# Patient Record
Sex: MALE | Race: WHITE | NOT HISPANIC OR LATINO | Employment: OTHER | URBAN - METROPOLITAN AREA
[De-identification: names, ages, dates, MRNs, and addresses within clinical notes are randomized per-mention and may not be internally consistent; named-entity substitution may affect disease eponyms.]

---

## 2017-01-03 ENCOUNTER — ALLSCRIPTS OFFICE VISIT (OUTPATIENT)
Dept: OTHER | Facility: OTHER | Age: 82
End: 2017-01-03

## 2017-01-03 ENCOUNTER — HOSPITAL ENCOUNTER (OUTPATIENT)
Dept: RADIOLOGY | Facility: HOSPITAL | Age: 82
Discharge: HOME | End: 2017-01-03
Attending: ORTHOPAEDIC SURGERY
Payer: MEDICARE

## 2017-01-03 DIAGNOSIS — I48.91 ATRIAL FIBRILLATION (HCC): ICD-10-CM

## 2017-01-03 DIAGNOSIS — M25.551 PAIN IN RIGHT HIP: ICD-10-CM

## 2017-01-03 DIAGNOSIS — I10 ESSENTIAL (PRIMARY) HYPERTENSION: ICD-10-CM

## 2017-01-03 DIAGNOSIS — I25.10 ATHEROSCLEROTIC HEART DISEASE OF NATIVE CORONARY ARTERY WITHOUT ANGINA PECTORIS: ICD-10-CM

## 2017-01-03 DIAGNOSIS — B35.6 TINEA CRURIS: ICD-10-CM

## 2017-01-03 DIAGNOSIS — E04.1 NONTOXIC SINGLE THYROID NODULE: ICD-10-CM

## 2017-01-03 PROCEDURE — 73502 X-RAY EXAM HIP UNI 2-3 VIEWS: CPT

## 2017-01-17 ENCOUNTER — APPOINTMENT (OUTPATIENT)
Dept: LAB | Age: 82
End: 2017-01-17
Payer: MEDICARE

## 2017-01-17 ENCOUNTER — TRANSCRIBE ORDERS (OUTPATIENT)
Dept: URGENT CARE | Age: 82
End: 2017-01-17

## 2017-01-17 DIAGNOSIS — I48.91 ATRIAL FIBRILLATION (HCC): ICD-10-CM

## 2017-01-17 LAB
INR PPP: 3.13 (ref 0.86–1.16)
PROTHROMBIN TIME: 31.6 SECONDS (ref 12–14.3)

## 2017-01-17 PROCEDURE — 36415 COLL VENOUS BLD VENIPUNCTURE: CPT

## 2017-01-17 PROCEDURE — 85610 PROTHROMBIN TIME: CPT

## 2017-01-18 ENCOUNTER — ALLSCRIPTS OFFICE VISIT (OUTPATIENT)
Dept: OTHER | Facility: OTHER | Age: 82
End: 2017-01-18

## 2017-01-24 ENCOUNTER — TRANSCRIBE ORDERS (OUTPATIENT)
Dept: ADMINISTRATIVE | Age: 82
End: 2017-01-24

## 2017-01-24 ENCOUNTER — APPOINTMENT (OUTPATIENT)
Dept: LAB | Age: 82
End: 2017-01-24
Payer: MEDICARE

## 2017-01-24 DIAGNOSIS — E04.1 NONTOXIC SINGLE THYROID NODULE: ICD-10-CM

## 2017-01-24 DIAGNOSIS — I10 ESSENTIAL (PRIMARY) HYPERTENSION: ICD-10-CM

## 2017-01-24 DIAGNOSIS — B35.6 TINEA CRURIS: ICD-10-CM

## 2017-01-24 DIAGNOSIS — I25.10 ATHEROSCLEROTIC HEART DISEASE OF NATIVE CORONARY ARTERY WITHOUT ANGINA PECTORIS: ICD-10-CM

## 2017-01-24 LAB
ALBUMIN SERPL BCP-MCNC: 3.5 G/DL (ref 3.5–5)
ALP SERPL-CCNC: 69 U/L (ref 46–116)
ALT SERPL W P-5'-P-CCNC: 15 U/L (ref 12–78)
ANION GAP SERPL CALCULATED.3IONS-SCNC: 9 MMOL/L (ref 4–13)
AST SERPL W P-5'-P-CCNC: 10 U/L (ref 5–45)
BILIRUB SERPL-MCNC: 0.51 MG/DL (ref 0.2–1)
BUN SERPL-MCNC: 14 MG/DL (ref 5–25)
CALCIUM SERPL-MCNC: 8.6 MG/DL (ref 8.3–10.1)
CHLORIDE SERPL-SCNC: 108 MMOL/L (ref 100–108)
CHOLEST SERPL-MCNC: 208 MG/DL (ref 50–200)
CO2 SERPL-SCNC: 24 MMOL/L (ref 21–32)
CREAT SERPL-MCNC: 1.01 MG/DL (ref 0.6–1.3)
GFR SERPL CREATININE-BSD FRML MDRD: >60 ML/MIN/1.73SQ M
GLUCOSE SERPL-MCNC: 98 MG/DL (ref 65–140)
HDLC SERPL-MCNC: 46 MG/DL (ref 40–60)
LDLC SERPL CALC-MCNC: 139 MG/DL (ref 0–100)
POTASSIUM SERPL-SCNC: 3.8 MMOL/L (ref 3.5–5.3)
PROT SERPL-MCNC: 6.5 G/DL (ref 6.4–8.2)
SODIUM SERPL-SCNC: 141 MMOL/L (ref 136–145)
TRIGL SERPL-MCNC: 113 MG/DL
TSH SERPL DL<=0.05 MIU/L-ACNC: 2.41 UIU/ML (ref 0.36–3.74)

## 2017-01-24 PROCEDURE — 84443 ASSAY THYROID STIM HORMONE: CPT

## 2017-01-24 PROCEDURE — 36415 COLL VENOUS BLD VENIPUNCTURE: CPT

## 2017-01-24 PROCEDURE — 80061 LIPID PANEL: CPT

## 2017-01-24 PROCEDURE — 80053 COMPREHEN METABOLIC PANEL: CPT

## 2017-01-26 ENCOUNTER — APPOINTMENT (OUTPATIENT)
Dept: LAB | Age: 82
End: 2017-01-26
Payer: MEDICARE

## 2017-01-26 ENCOUNTER — TRANSCRIBE ORDERS (OUTPATIENT)
Dept: ADMINISTRATIVE | Age: 82
End: 2017-01-26

## 2017-01-26 DIAGNOSIS — I48.91 ATRIAL FIBRILLATION, UNSPECIFIED TYPE (HCC): Primary | ICD-10-CM

## 2017-01-26 DIAGNOSIS — I48.91 ATRIAL FIBRILLATION, UNSPECIFIED TYPE (HCC): ICD-10-CM

## 2017-01-26 LAB
INR PPP: 3.31 (ref 0.86–1.16)
PROTHROMBIN TIME: 32.9 SECONDS (ref 12–14.3)

## 2017-01-26 PROCEDURE — 85610 PROTHROMBIN TIME: CPT

## 2017-01-26 PROCEDURE — 36415 COLL VENOUS BLD VENIPUNCTURE: CPT

## 2017-02-07 ENCOUNTER — APPOINTMENT (OUTPATIENT)
Dept: LAB | Age: 82
End: 2017-02-07
Payer: MEDICARE

## 2017-02-07 DIAGNOSIS — I48.91 ATRIAL FIBRILLATION, UNSPECIFIED TYPE (HCC): ICD-10-CM

## 2017-02-07 LAB
INR PPP: 2.07 (ref 0.86–1.16)
PROTHROMBIN TIME: 23.1 SECONDS (ref 12–14.3)

## 2017-02-07 PROCEDURE — 36415 COLL VENOUS BLD VENIPUNCTURE: CPT

## 2017-02-07 PROCEDURE — 85610 PROTHROMBIN TIME: CPT

## 2017-02-08 ENCOUNTER — ALLSCRIPTS OFFICE VISIT (OUTPATIENT)
Dept: OTHER | Facility: OTHER | Age: 82
End: 2017-02-08

## 2017-02-21 ENCOUNTER — ALLSCRIPTS OFFICE VISIT (OUTPATIENT)
Dept: OTHER | Facility: OTHER | Age: 82
End: 2017-02-21

## 2017-04-04 ENCOUNTER — ALLSCRIPTS OFFICE VISIT (OUTPATIENT)
Dept: OTHER | Facility: OTHER | Age: 82
End: 2017-04-04

## 2017-04-05 ENCOUNTER — ALLSCRIPTS OFFICE VISIT (OUTPATIENT)
Dept: OTHER | Facility: OTHER | Age: 82
End: 2017-04-05

## 2017-04-27 ENCOUNTER — ALLSCRIPTS OFFICE VISIT (OUTPATIENT)
Dept: OTHER | Facility: OTHER | Age: 82
End: 2017-04-27

## 2017-05-11 ENCOUNTER — ALLSCRIPTS OFFICE VISIT (OUTPATIENT)
Dept: OTHER | Facility: OTHER | Age: 82
End: 2017-05-11

## 2017-05-16 ENCOUNTER — ALLSCRIPTS OFFICE VISIT (OUTPATIENT)
Dept: OTHER | Facility: OTHER | Age: 82
End: 2017-05-16

## 2017-05-16 DIAGNOSIS — M48.061 SPINAL STENOSIS OF LUMBAR REGION: ICD-10-CM

## 2017-05-16 DIAGNOSIS — M54.16 RADICULOPATHY OF LUMBAR REGION: ICD-10-CM

## 2017-05-16 DIAGNOSIS — M51.36 OTHER INTERVERTEBRAL DISC DEGENERATION, LUMBAR REGION: ICD-10-CM

## 2017-05-16 DIAGNOSIS — M54.50 LOW BACK PAIN: ICD-10-CM

## 2017-05-16 DIAGNOSIS — G89.4 CHRONIC PAIN SYNDROME: ICD-10-CM

## 2017-05-28 ENCOUNTER — HOSPITAL ENCOUNTER (OUTPATIENT)
Dept: CT IMAGING | Facility: HOSPITAL | Age: 82
Discharge: HOME | End: 2017-05-28
Attending: ANESTHESIOLOGY
Payer: MEDICARE

## 2017-05-28 DIAGNOSIS — M48.061 SPINAL STENOSIS OF LUMBAR REGION: ICD-10-CM

## 2017-05-28 DIAGNOSIS — G89.4 CHRONIC PAIN SYNDROME: ICD-10-CM

## 2017-05-28 DIAGNOSIS — M51.36 OTHER INTERVERTEBRAL DISC DEGENERATION, LUMBAR REGION: ICD-10-CM

## 2017-05-28 DIAGNOSIS — M54.16 RADICULOPATHY OF LUMBAR REGION: ICD-10-CM

## 2017-05-28 DIAGNOSIS — M54.50 LOW BACK PAIN: ICD-10-CM

## 2017-05-28 PROCEDURE — 72131 CT LUMBAR SPINE W/O DYE: CPT

## 2017-06-07 ENCOUNTER — ALLSCRIPTS OFFICE VISIT (OUTPATIENT)
Dept: OTHER | Facility: OTHER | Age: 82
End: 2017-06-07

## 2017-06-13 ENCOUNTER — GENERIC CONVERSION - ENCOUNTER (OUTPATIENT)
Dept: OTHER | Facility: OTHER | Age: 82
End: 2017-06-13

## 2017-06-22 ENCOUNTER — GENERIC CONVERSION - ENCOUNTER (OUTPATIENT)
Dept: OTHER | Facility: OTHER | Age: 82
End: 2017-06-22

## 2017-06-28 ENCOUNTER — GENERIC CONVERSION - ENCOUNTER (OUTPATIENT)
Dept: OTHER | Facility: OTHER | Age: 82
End: 2017-06-28

## 2017-07-05 ENCOUNTER — ALLSCRIPTS OFFICE VISIT (OUTPATIENT)
Dept: OTHER | Facility: OTHER | Age: 82
End: 2017-07-05

## 2017-07-07 ENCOUNTER — TRANSCRIBE ORDERS (OUTPATIENT)
Dept: LAB | Facility: HOSPITAL | Age: 82
End: 2017-07-07

## 2017-07-07 ENCOUNTER — APPOINTMENT (OUTPATIENT)
Dept: LAB | Facility: HOSPITAL | Age: 82
End: 2017-07-07
Attending: ANESTHESIOLOGY
Payer: MEDICARE

## 2017-07-07 ENCOUNTER — ALLSCRIPTS OFFICE VISIT (OUTPATIENT)
Dept: RADIOLOGY | Facility: CLINIC | Age: 82
End: 2017-07-07
Payer: MEDICARE

## 2017-07-07 DIAGNOSIS — I48.91 ATRIAL FIBRILLATION (HCC): ICD-10-CM

## 2017-07-07 DIAGNOSIS — N39.0 URINARY TRACT INFECTION: ICD-10-CM

## 2017-07-07 LAB
INR PPP: 1.12 (ref 0.86–1.16)
PROTHROMBIN TIME: 14.4 SECONDS (ref 12.1–14.4)

## 2017-07-07 PROCEDURE — 85610 PROTHROMBIN TIME: CPT

## 2017-07-07 PROCEDURE — 36415 COLL VENOUS BLD VENIPUNCTURE: CPT

## 2017-07-31 ENCOUNTER — GENERIC CONVERSION - ENCOUNTER (OUTPATIENT)
Dept: OTHER | Facility: OTHER | Age: 82
End: 2017-07-31

## 2017-08-02 ENCOUNTER — ALLSCRIPTS OFFICE VISIT (OUTPATIENT)
Dept: OTHER | Facility: OTHER | Age: 82
End: 2017-08-02

## 2017-08-16 ENCOUNTER — ALLSCRIPTS OFFICE VISIT (OUTPATIENT)
Dept: OTHER | Facility: OTHER | Age: 82
End: 2017-08-16

## 2017-09-06 ENCOUNTER — GENERIC CONVERSION - ENCOUNTER (OUTPATIENT)
Dept: OTHER | Facility: OTHER | Age: 82
End: 2017-09-06

## 2017-09-20 ENCOUNTER — GENERIC CONVERSION - ENCOUNTER (OUTPATIENT)
Dept: OTHER | Facility: OTHER | Age: 82
End: 2017-09-20

## 2017-09-29 ENCOUNTER — GENERIC CONVERSION - ENCOUNTER (OUTPATIENT)
Dept: OTHER | Facility: OTHER | Age: 82
End: 2017-09-29

## 2017-11-08 ENCOUNTER — ALLSCRIPTS OFFICE VISIT (OUTPATIENT)
Dept: OTHER | Facility: OTHER | Age: 82
End: 2017-11-08

## 2017-11-10 NOTE — PROGRESS NOTES
Assessment    1  Acute diastolic congestive heart failure (428 31,428 0) (I50 31)   2  Cellulitis of right lower extremity (682 6) (L03 115)   3  Fall (E888 9) [facility])   4  Pressure ulcer, buttock (707 05,707 20) (L89 309)   5  Unsteady gait (781 2) (R26 81)   6  Bladder incontinence (788 30) (R32)   7  Renal insufficiency (593 9) (N28 9)    Plan  Acute diastolic congestive heart failure    · Furosemide 40 MG Oral Tablet; Take 1 tablet daily   · Potassium Chloride ER 10 MEQ Oral Capsule Extended Release; take 1 capsule daily  Cellulitis of right lower extremity    · Cephalexin 500 MG Oral Capsule; TAKE 1 CAPSULE 4 TIMES DAILY   · (1) COMPREHENSIVE METABOLIC PANEL; Status:Active; Requested for:13Nov2017;   1  Acute diastolic congestive heart failure patient has no recent angina symptoms he does have +2 pitting edema of both legs  He appears to have a diastolic congestive heart failure condition  He is in chronic atrial fibrillation and has a reduced GFR with renal insufficiency  Will cautiously add a diuretic of 40 mg for 0 some eye daily along with 10 mEq of potassium chloride  A follow-up comprehensive metabolic profile is requested within 1 week  He is encouraged to elevate his feet when he is not ambulating  He is also encouraged to decrease salt consumption  2   Cellulitis with open wound of the right lower leg wound was clean and redressed today  He does have an appointment with the [facility] on Friday of this week  He is not making any progress on clindamycin we switched him today to Keflex 500 mg 4 times a day to treat the cellulitis  He may need some debridement of the wound at the wound clinic  3   History of fall patient is by himself for part of the day which appears to be a high risk situation    I did mention to the family that I believe this may be approaching a point where he should no longer be in an independent living situation by himself if he is going to remain in the independent living situation he may need more assistant throughout the day for the patient's safety  4   Pressure ulcer of the buttocks compounded by the fact that the patient is incontinent of urine and sits in a depends saturated with urine sometimes  Indicated that he needs to avoid voiding in the depends  I suggested that they discuss with his the patient's urologist the possibility of a Rodriguez catheter  Did also discussed with them the fact that the Rodriguez catheter might increase his risk of urinary tract infections  5   Unsteady gait the patient requires assistance of 2 people to stand from a seated position we did not try to test his gait as he is very weak and unsteady on his feet  Again this patient should most likely be moved from an independent living situation to at the very minimum assisted living if not skilled nursing  6   Bladder incontinence will discuss Rodriguez catheter with the patient's urologist   7   Renal insufficiency patient has a decreased GFR and elevated creatinine  Will start diuretic to take to treat his congestive heart failure but will closely monitor his kidney function  Discussion/Summary  Discussion Summary:   In summary met with the patient and his granddaughter today he has multiple significant problems at the advanced age of 80  He will be going to the wound clinic for further management of his right lower leg wound he also has diastolic congestive heart failure and renal insufficiency  He has a stage II decubitus of his left buttocks secondary to sitting for an extended period of time during the day as well as incontinence of urine into his depends garment  Will see the patient in a week for follow-up assessment  Counseling Documentation With Imm: total time of encounter was 45 minutes-- and-- 35 minutes was spent counseling        Chief Complaint  Chief Complaint Free Text Note Form: Patient is here today for a follow up visit after an ER trip       History of Present Illness  HPI: This 40-year-old gentleman presents today in the company of his granddaughter  He was seen in the emergency room at [facility] on November 5, 2017  He had a wound to his right lower leg which was sustained in a fall  This wound was infected and the patient was started on clindamycin 300 mg 3 times a day  In the ensuing time there has been no significant improvement in the wound or inflammation in his leg  He has bilateral edema of both lower legs  He denies any chest pain palpitations or shortness of breath   patient also has a stage II decubitus of his left buttocks  patient lives in an independent living facility with the assistance of health aides  He does not have round-the-clock assistance  When I asked him how he gets to the bathroom he indicates that frequently just avoids in his depends garments  He spends the bulk of his time in a recliner during the day moving into a wheelchair for mobility to the dining osborne  He denies any fevers or chills his appetite has been somewhat diminished  review of the blood work performed at the emergency room indicates that he does have a mild renal insufficiency with a GFR 47 cc/minute and a creatinine of 1 26  He also has a mild elevation in his blood sugar of 106 but this was a random blood sugar reading  Review of Systems  Complete-Male:  Constitutional: feeling poorly-- and-- feeling tired, but-- no fever-- and-- no chills  Eyes: No complaints of eye pain, no red eyes, no discharge from eyes, no itchy eyes  ENT: no complaints of earache, no hearing loss, no nosebleeds, no nasal discharge, no sore throat, no hoarseness  Cardiovascular: lower extremity edema-- and-- Bilateral lower leg edema +2 pitting, but-- as noted in HPI  Respiratory: No complaints of shortness of breath, no wheezing, no cough, no SOB on exertion, no orthopnea or PND    Gastrointestinal: No complaints of abdominal pain, no constipation, no nausea or vomiting, no diarrhea or bloody stools  Genitourinary: incontinence  Musculoskeletal: No complaints of arthralgia, no myalgias, no joint swelling or stiffness, no limb pain or swelling  Integumentary: Wound on the right lower shin area approximately 1 inch x 3 inches with surrounding area of cellulitis  Neurological: No compliants of headache, no confusion, no convulsions, no numbness or tingling, no dizziness or fainting, no limb weakness, no difficulty walking  Psychiatric: Is not suicidal, no sleep disturbances, no anxiety or depression, no change in personality, no emotional problems  Endocrine: No complaints of proptosis, no hot flashes, no muscle weakness, no erectile dysfunction, no deepening of the voice, no feelings of weakness  Hematologic/Lymphatic: No complaints of swollen glands, no swollen glands in the neck, does not bleed easily, no easy bruising  Active Problems    1  Acid indigestion (536 8) (K30)   2  Acute UTI (599 0) (N39 0)   3  Adjustment disorder with mixed anxiety and depressed mood (309 28) (F43 23)   4  Allergic rhinitis (477 9) (J30 9)   5  At risk for injury related to fall (V49 89) (Z91 89)   6  Atrial fibrillation (427 31) (I48 91)   7  Back pain (724 5) (M54 9)   8  Benign Localized Prostatic Hyperplasia Without Urinary Obstruction With Other Lower Urinary Tract Symptoms (600 20)   9  CAD (coronary artery disease) (414 00) (I25 10)   10  Carpal tunnel syndrome (354 0) (G56 00)   11  Chronic low back pain (724 2,338 29) (M54 5,G89 29)   12  Chronic pain syndrome (338 4) (G89 4)   13  Constipation, acute (564 00) (K59 00)   14  DDD (degenerative disc disease), lumbar (722 52) (M51 36)   15  Depression (311) (F32 9)   16  DJD (degenerative joint disease) of pelvis (715 95) (M16 10)   17  Dyspnea (786 09) (R06 00)   18  Dysuria (788 1) (R30 0)   19  Edema (782 3) (R60 9)   20  Esophageal reflux (530 81) (K21 9)   21  Fall (E888 9) (W19 XXXA)   22  Fatigue (780 79) (R53 83)   23  Foreskin inflammation (607 2) (N48 29)   24  Foreskin problem (607 89) (N47 8)   25  Greater trochanteric bursitis, right (726 5) (M70 61)   26  Heart block (426 9) (I45 9)   27  Hypertension (401 9) (I10)   28  Insomnia (780 52) (G47 00)   29  Lumbar canal stenosis (724 02) (M48 061)   30  Lumbar radiculopathy (724 4) (M54 16)   31  History of Need for prophylactic vaccination and inoculation against influenza (V04 81) (Z23)   32  Nocturia (788 43) (R35 1)   33  Orthopedic Aftercare For Healing Traumatic Fx Lower Arm (V54 12)   34  Osteoarthritis of knee, unilateral (715 16) (M17 10)   35  Osteoarthrosis (715 90) (M19 90)   36  Other acute gastritis without hemorrhage (535 00) (K29 00)   37  Other chronic pain (338 29) (G89 29)   38  Pain in joint of right hip (719 45) (M25 551)   39  Physical deconditioning (799 3) (R53 81)   40  Pressure ulcer, buttock (707 05,707 20) (L89 309)   41  Prostatitis (601 9) (N41 9)   42  Right hip pain (719 45) (M25 551)   43  Sick sinus syndrome (427 81) (I49 5)   44  Skin tear (879 8)   45  Sleep disorder (780 50) (G47 9)   46  Symptoms involving urinary system (788 99) (R39 9)   47  Thyroid nodule (241 0) (E04 1)   48  Tinea cruris (110 3) (B35 6)   49  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)   50  Unsteady gait (781 2) (R26 81)   51  Urgency of urination (788 63) (R39 15)   52  URI (upper respiratory infection) (465 9) (J06 9)   53  Urinary frequency (788 41) (R35 0)   54  UTI (urinary tract infection) (599 0) (N39 0)   55  Viral infection (079 99) (B34 9)   56  Yeast infection of the skin (112 3) (B37 2)    Past Medical History  1  History of Acute deep vein thrombosis of lower limb, unspecified laterality   2  History of Arthralgia of right shoulder region (719 41) (M25 511)   3  History of Arthralgia Of The Right Pelvis/Hip/Femur (719 45)   4  History of Arthritis (V13 4)   5   History of Arthropathy of lumbar facet joint (721 3) (M12 88)   6  History of 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 01,599 69) (N40 1,N13 8)   7  History of Compression fracture (829 0)   8  History of Depression (311) (F32 9)   9  History of Fracture of radius, distal, closed (813 42) (S52 509A)   10  History of acute sinusitis (V12 69) (Z87 09)   11  History of bronchitis (V12 69) (Z87 09)   12  History of gastroesophageal reflux (GERD) (V12 79) (Z87 19)   13  History of herpes simplex infection (V12 09) (Z86 19)   14  History of Need for immunization against influenza (V04 81) (Z23)   15  History of Need for prophylactic vaccination and inoculation against influenza (V04 81) (Z23)   16  History of Nephrolithiasis (V13 01)   17  History of Organic impotence (607 84) (N52 9)   18  History of Pacemaker Evaluation Adjustment Of Cardiac Pacemaker   19  History of Tachycardia (785 0) (R00 0)   20  History of Venous insufficiency (chronic) (peripheral) (459 81) (I87 2)  Active Problems And Past Medical History Reviewed: The active problems and past medical history were reviewed and updated today  Surgical History  1  History of Appendectomy   2  History of Back Surgery   3  History of Cholecystectomy   4  History of Diagnostic Cystoscopy   5  History of Gallbladder Surgery   6  History of Hip Surgery   7  Orthopedic Aftercare For Healing Traumatic Fx Lower Arm (V54 12)   8  History of Pacemaker Placement   9  History of Pacemaker Placement  Surgical History Reviewed: The surgical history was reviewed and updated today  Family History  Brother    1  Family history of Heart Disease (V17 49)  Family History    2  Family history of Cancer   3  Family history of Reported Family History Of Cancer   4  Family history of Reported Family History Of Heart Disease   5  Family history of Stroke Syndrome (V17 1)  Family History Reviewed: The family history was reviewed and updated today         Social History     · Denied: History of Alcohol Use (History)   · Being A Social Drinker   · Denied: History of Current Smoker   · Denied: History of Drug Use   · Former smoker (V15 82) (U90 219)   · Never A Smoker   ·   Social History Reviewed: The social history was reviewed and updated today  The social history was reviewed and is unchanged  Current Meds   1  Aspercreme LOTN; Therapy: (Recorded:37Lbu6165) to Recorded   2  Centrum Silver Oral Tablet Recorded   3  [employer] Allergy Relief 180 MG Oral Tablet Recorded   4  [employer] Vitamin D CAPS Recorded   5  Famciclovir 125 MG Oral Tablet; Take 1 tablet daily; Therapy: 85LEX3861 to (Evaluate:28Apr2018)  Requested for: 85Auj8840; Last Rx:01Aug2017 Ordered   6  Finasteride 5 MG Oral Tablet; TAKE 1 TABLET AT BEDTIME; Therapy: 08KQK5120 to (Last Rx:19Jan2017)  Requested for: 20Jan2017 Ordered   7  Flecainide Acetate 50 MG Oral Tablet; Take 1 tablet daily; Therapy: 84XMR8623 to (Evaluate:12Oct2017)  Requested for: 25DZD6201; Last Rx:17Oct2016 Ordered   8  Fluticasone Propionate 50 MCG/ACT Nasal Suspension; USE 1 SPRAY IN EACH NOSTRIL TWICE DAILY; Therapy: 17SWV3133 to (Last Rx:93Xhe0976)  Requested for: 91Vhk9472 Ordered   9  Galantamine Hydrobromide 4 MG Oral Tablet; Take 1 tablet daily; Therapy: 27PHD4455 to (JECHTGXV:34IJN9406)  Requested for: 24JOP6021; Last Rx:27Jan2017 Ordered   10  Hydrocodone-Acetaminophen 5-325 MG Oral Tablet; TAKE 1 TABLET EVERY 4 TO 6 HOURS AS  NEEDED; Therapy: (Recorded:07Jun2017) to Recorded   11  MiraLax Oral Powder; MIX 17 GRAMS IN 8 OUNCES OF WATER AND DRINK ONCE DAILY  As needed  for constipation; Therapy: 33RZI4525 to ([id number])  Requested for: 20PTU7460; Last Rx:34Pow3456  Ordered   12  Pantoprazole Sodium 40 MG Oral Tablet Delayed Release; Take 1 tablet twice daily; Therapy: 83HFS6922 to (QFZPTGXQ:28NNN0836)  Requested for: 67FZF3874; Last Rx:18Jan2017  Ordered   13  Restasis 0 05 % Ophthalmic Emulsion;   Therapy: 22Dec2014 to (Jordan Berry) Recorded   14  Tamsulosin HCl - 0 4 MG Oral Capsule; TAKE 1 CAPSULE AT BEDTIME; Therapy: 75CVE2368 to (Last Rx:19Jan2017)  Requested for: 20Jan2017 Ordered   15  TraZODone HCl - 50 MG Oral Tablet; Take 1 tablet by mouth at bedtime; Therapy: 71VMY8343 to )  Requested for: 21YYT2132; Last Rx:23Xhq4328  Ordered   16  VESIcare 5 MG Oral Tablet; One pill at bedtime; Therapy: 40BCE5491 to (Evaluate:14Jan2018)  Requested for: 33ILR3079; Last Rx:19Jan2017  Ordered   17  Warfarin Sodium 2 MG Oral Tablet; Take 1 tablet daily as directed; Therapy: 21GJS8782 to (Evaluate:07Iza7119)  Requested for: 38Ana1667; Last Rx:49Npl3511  Ordered   18  Warfarin Sodium 5 MG Oral Tablet; Take 1 tablet daily as directed; Therapy: 19GLM5909 to (Last Rx:71Wtl6996)  Requested for: 87Jyj9806 Ordered   19  Zolpidem Tartrate 5 MG Oral Tablet; TAKE 1 TABLET AT BEDTIME AS NEEDED FOR SLEEP; Therapy: 53VRS5916 to (Evaluate:95Xvo0967); Last Rx:20Sep2017 Ordered  Medication List Reviewed: The medication list was reviewed and updated today  Allergies  1  CeleBREX CAPS   2  Sulfa Drugs  3  No Known Environmental Allergies   4  No Known Food Allergies    Vitals  Vital Signs    Recorded: 10XRG0519 11:36AM   Temperature 97 3 F    Heart Rate 85    Systolic 718    Diastolic 70    Height 5 ft 7 in    Patient Refused Weight Yes Yes   O2 Saturation 94        Physical Exam   Constitutional  General appearance: No acute distress, well appearing and well nourished  Eyes  Conjunctiva and lids: No swelling, erythema, or discharge  Ears, Nose, Mouth, and Throat  External inspection of ears and nose: Normal    Pulmonary  Respiratory effort: No increased work of breathing or signs of respiratory distress  Auscultation of lungs: Clear to auscultation, equal breath sounds bilaterally, no wheezes, no rales, no rhonci  -- Lungs clear bilaterally no rales rhonchi or wheezing are audible    Cardiovascular  Auscultation of heart: Abnormal  -- Irregular rhythm of the heart  Examination of extremities for edema and/or varicosities: Abnormal  -- Plus two pitting edema of the feet and lower legs below the knees bilaterally  Carotid pulses: Normal    Lymphatic  Palpation of lymph nodes in neck: No lymphadenopathy  Musculoskeletal  Gait and station: Abnormal  -- Very unsteady requires a supportive 2 people to stand from a seated position  Psychiatric  Orientation to person, place and time: Normal    Mood and affect: Normal    Additional Exam:  Open wound of the right lower leg approximately 1 x 3 inches in size with appears to be necrotic tissue at the base  A surrounding area of cellulitis present  Future Appointments    Date/Time Provider Specialty Site   11/28/2017 03:00 PM Cardiology, [address] [place] [facility]   02/06/2018 10:30 AM Cardiology, Device Remote   [address]   05/07/2018 08:30 AM Cardiology, Device Remote   [address]   08/08/2018 09:30 AM Cardiology, Device Remote   [address]   11/15/2017 09:30 AM RADHA Hernandez  Internal Medicine [facility] INTERNAL MED   11/16/2017 05:15 PM RADHA Hernandez   Internal Medicine [address]       Signatures   Electronically signed by : RADHA Ballesteros ; Nov 8 2017  2:57PM EST                       (Author)

## 2017-11-13 DIAGNOSIS — L03.115 CELLULITIS OF RIGHT LOWER EXTREMITY: ICD-10-CM

## 2017-11-15 ENCOUNTER — GENERIC CONVERSION - ENCOUNTER (OUTPATIENT)
Dept: OTHER | Facility: OTHER | Age: 82
End: 2017-11-15

## 2017-11-28 ENCOUNTER — ALLSCRIPTS OFFICE VISIT (OUTPATIENT)
Dept: OTHER | Facility: OTHER | Age: 82
End: 2017-11-28

## 2017-12-04 ENCOUNTER — HOSPITAL ENCOUNTER (INPATIENT)
Facility: HOSPITAL | Age: 82
LOS: 4 days | Discharge: NURSING, REHAB OR LONG-TERM CARE | DRG: 193 | End: 2017-12-08
Attending: EMERGENCY MEDICINE | Admitting: EMERGENCY MEDICINE
Payer: MEDICARE

## 2017-12-04 ENCOUNTER — APPOINTMENT (EMERGENCY)
Dept: RADIOLOGY | Facility: HOSPITAL | Age: 82
DRG: 193 | End: 2017-12-04
Payer: MEDICARE

## 2017-12-04 DIAGNOSIS — J18.9 PNEUMONIA OF BOTH LUNGS DUE TO INFECTIOUS ORGANISM, UNSPECIFIED PART OF LUNG: Primary | ICD-10-CM

## 2017-12-04 DIAGNOSIS — I48.20 CHRONIC ATRIAL FIBRILLATION (HCC): ICD-10-CM

## 2017-12-04 LAB
ALBUMIN SERPL BCP-MCNC: 2.7 G/DL (ref 3.5–5)
ALP SERPL-CCNC: 60 U/L (ref 46–116)
ALT SERPL W P-5'-P-CCNC: 17 U/L (ref 12–78)
ANION GAP SERPL CALCULATED.3IONS-SCNC: 8 MMOL/L (ref 4–13)
AST SERPL W P-5'-P-CCNC: 12 U/L (ref 5–45)
ATRIAL RATE: 81 BPM
BASOPHILS # BLD AUTO: 0.01 THOUSANDS/ΜL (ref 0–0.1)
BASOPHILS NFR BLD AUTO: 0 % (ref 0–1)
BILIRUB SERPL-MCNC: 0.4 MG/DL (ref 0.2–1)
BILIRUB UR QL STRIP: NEGATIVE
BUN SERPL-MCNC: 22 MG/DL (ref 5–25)
CALCIUM SERPL-MCNC: 8.8 MG/DL (ref 8.3–10.1)
CHLORIDE SERPL-SCNC: 103 MMOL/L (ref 100–108)
CLARITY UR: CLEAR
CO2 SERPL-SCNC: 27 MMOL/L (ref 21–32)
COLOR UR: YELLOW
CREAT SERPL-MCNC: 1.32 MG/DL (ref 0.6–1.3)
EOSINOPHIL # BLD AUTO: 0.05 THOUSAND/ΜL (ref 0–0.61)
EOSINOPHIL NFR BLD AUTO: 0 % (ref 0–6)
ERYTHROCYTE [DISTWIDTH] IN BLOOD BY AUTOMATED COUNT: 13.5 % (ref 11.6–15.1)
GFR SERPL CREATININE-BSD FRML MDRD: 45 ML/MIN/1.73SQ M
GLUCOSE SERPL-MCNC: 116 MG/DL (ref 65–140)
GLUCOSE UR STRIP-MCNC: NEGATIVE MG/DL
HCT VFR BLD AUTO: 34.9 % (ref 36.5–49.3)
HGB BLD-MCNC: 11.3 G/DL (ref 12–17)
HGB UR QL STRIP.AUTO: NEGATIVE
HOLD SPECIMEN: NORMAL
INR PPP: 2.08 (ref 0.86–1.16)
KETONES UR STRIP-MCNC: NEGATIVE MG/DL
LACTATE SERPL-SCNC: 1.4 MMOL/L (ref 0.5–2)
LEUKOCYTE ESTERASE UR QL STRIP: NEGATIVE
LYMPHOCYTES # BLD AUTO: 1.5 THOUSANDS/ΜL (ref 0.6–4.47)
LYMPHOCYTES NFR BLD AUTO: 13 % (ref 14–44)
MCH RBC QN AUTO: 28.8 PG (ref 26.8–34.3)
MCHC RBC AUTO-ENTMCNC: 32.4 G/DL (ref 31.4–37.4)
MCV RBC AUTO: 89 FL (ref 82–98)
MONOCYTES # BLD AUTO: 0.75 THOUSAND/ΜL (ref 0.17–1.22)
MONOCYTES NFR BLD AUTO: 7 % (ref 4–12)
NEUTROPHILS # BLD AUTO: 9.05 THOUSANDS/ΜL (ref 1.85–7.62)
NEUTS SEG NFR BLD AUTO: 80 % (ref 43–75)
NITRITE UR QL STRIP: NEGATIVE
P AXIS: 12 DEGREES
PH UR STRIP.AUTO: 6.5 [PH] (ref 4.5–8)
PLATELET # BLD AUTO: 191 THOUSANDS/UL (ref 149–390)
PMV BLD AUTO: 9.7 FL (ref 8.9–12.7)
POTASSIUM SERPL-SCNC: 3.8 MMOL/L (ref 3.5–5.3)
PR INTERVAL: 120 MS
PROT SERPL-MCNC: 6.2 G/DL (ref 6.4–8.2)
PROT UR STRIP-MCNC: NEGATIVE MG/DL
PROTHROMBIN TIME: 24.1 SECONDS (ref 12.1–14.4)
QRS AXIS: -71 DEGREES
QRSD INTERVAL: 164 MS
QT INTERVAL: 430 MS
QTC INTERVAL: 499 MS
RBC # BLD AUTO: 3.93 MILLION/UL (ref 3.88–5.62)
SODIUM SERPL-SCNC: 138 MMOL/L (ref 136–145)
SP GR UR STRIP.AUTO: 1.01 (ref 1–1.03)
T WAVE AXIS: 90 DEGREES
UROBILINOGEN UR QL STRIP.AUTO: 0.2 E.U./DL
VENTRICULAR RATE: 81 BPM
WBC # BLD AUTO: 11.36 THOUSAND/UL (ref 4.31–10.16)

## 2017-12-04 PROCEDURE — 87040 BLOOD CULTURE FOR BACTERIA: CPT | Performed by: EMERGENCY MEDICINE

## 2017-12-04 PROCEDURE — 71020 HB CHEST X-RAY 2VW FRONTAL&LATL: CPT

## 2017-12-04 PROCEDURE — 36415 COLL VENOUS BLD VENIPUNCTURE: CPT | Performed by: PHYSICIAN ASSISTANT

## 2017-12-04 PROCEDURE — 87798 DETECT AGENT NOS DNA AMP: CPT | Performed by: EMERGENCY MEDICINE

## 2017-12-04 PROCEDURE — 81003 URINALYSIS AUTO W/O SCOPE: CPT | Performed by: PHYSICIAN ASSISTANT

## 2017-12-04 PROCEDURE — 85025 COMPLETE CBC W/AUTO DIFF WBC: CPT | Performed by: PHYSICIAN ASSISTANT

## 2017-12-04 PROCEDURE — 83605 ASSAY OF LACTIC ACID: CPT | Performed by: PHYSICIAN ASSISTANT

## 2017-12-04 PROCEDURE — 99285 EMERGENCY DEPT VISIT HI MDM: CPT

## 2017-12-04 PROCEDURE — 93005 ELECTROCARDIOGRAM TRACING: CPT

## 2017-12-04 PROCEDURE — 96374 THER/PROPH/DIAG INJ IV PUSH: CPT

## 2017-12-04 PROCEDURE — 85610 PROTHROMBIN TIME: CPT | Performed by: PHYSICIAN ASSISTANT

## 2017-12-04 PROCEDURE — 80053 COMPREHEN METABOLIC PANEL: CPT | Performed by: PHYSICIAN ASSISTANT

## 2017-12-04 RX ORDER — ACETAMINOPHEN 325 MG/1
650 TABLET ORAL EVERY 6 HOURS PRN
Status: DISCONTINUED | OUTPATIENT
Start: 2017-12-04 | End: 2017-12-08 | Stop reason: HOSPADM

## 2017-12-04 RX ORDER — FLECAINIDE ACETATE 50 MG/1
50 TABLET ORAL EVERY MORNING
COMMUNITY
Start: 2016-05-26 | End: 2018-04-13 | Stop reason: SDUPTHER

## 2017-12-04 RX ORDER — FINASTERIDE 5 MG/1
5 TABLET, FILM COATED ORAL
Status: DISCONTINUED | OUTPATIENT
Start: 2017-12-04 | End: 2017-12-08 | Stop reason: HOSPADM

## 2017-12-04 RX ORDER — DONEPEZIL HYDROCHLORIDE 5 MG/1
5 TABLET, FILM COATED ORAL
Status: DISCONTINUED | OUTPATIENT
Start: 2017-12-04 | End: 2017-12-08 | Stop reason: HOSPADM

## 2017-12-04 RX ORDER — FUROSEMIDE 40 MG/1
40 TABLET ORAL EVERY MORNING
COMMUNITY
Start: 2017-11-08 | End: 2018-01-31 | Stop reason: SDUPTHER

## 2017-12-04 RX ORDER — WARFARIN SODIUM 2.5 MG/1
2.5 TABLET ORAL
Status: DISCONTINUED | OUTPATIENT
Start: 2017-12-04 | End: 2017-12-04

## 2017-12-04 RX ORDER — TRAZODONE HYDROCHLORIDE 50 MG/1
50 TABLET ORAL
Status: DISCONTINUED | OUTPATIENT
Start: 2017-12-04 | End: 2017-12-08 | Stop reason: HOSPADM

## 2017-12-04 RX ORDER — TRAZODONE HYDROCHLORIDE 50 MG/1
50 TABLET ORAL
COMMUNITY
Start: 2017-09-27

## 2017-12-04 RX ORDER — WARFARIN SODIUM 2.5 MG/1
2.5 TABLET ORAL
Status: DISCONTINUED | OUTPATIENT
Start: 2017-12-04 | End: 2017-12-08 | Stop reason: HOSPADM

## 2017-12-04 RX ORDER — SOLIFENACIN SUCCINATE 5 MG/1
5 TABLET, FILM COATED ORAL
COMMUNITY
Start: 2015-03-12

## 2017-12-04 RX ORDER — CYCLOSPORINE 0.5 MG/ML
1 EMULSION OPHTHALMIC 2 TIMES DAILY
Status: DISCONTINUED | OUTPATIENT
Start: 2017-12-05 | End: 2017-12-08 | Stop reason: HOSPADM

## 2017-12-04 RX ORDER — PANTOPRAZOLE SODIUM 40 MG/1
40 TABLET, DELAYED RELEASE ORAL 2 TIMES DAILY
Status: DISCONTINUED | OUTPATIENT
Start: 2017-12-04 | End: 2017-12-08 | Stop reason: HOSPADM

## 2017-12-04 RX ORDER — FLUTICASONE PROPIONATE 50 MCG
2 SPRAY, SUSPENSION (ML) NASAL DAILY
Status: DISCONTINUED | OUTPATIENT
Start: 2017-12-05 | End: 2017-12-08 | Stop reason: HOSPADM

## 2017-12-04 RX ORDER — WARFARIN SODIUM 5 MG/1
5 TABLET ORAL
Status: DISCONTINUED | OUTPATIENT
Start: 2017-12-07 | End: 2017-12-08 | Stop reason: HOSPADM

## 2017-12-04 RX ORDER — FEXOFENADINE HCL 180 MG/1
180 TABLET ORAL DAILY
COMMUNITY
End: 2017-12-04

## 2017-12-04 RX ORDER — WARFARIN SODIUM 5 MG/1
5 TABLET ORAL
Status: DISCONTINUED | OUTPATIENT
Start: 2017-12-05 | End: 2017-12-08 | Stop reason: HOSPADM

## 2017-12-04 RX ORDER — TAMSULOSIN HYDROCHLORIDE 0.4 MG/1
CAPSULE ORAL
COMMUNITY
Start: 2012-11-28 | End: 2017-12-04

## 2017-12-04 RX ORDER — GABAPENTIN 100 MG/1
100 CAPSULE ORAL DAILY
COMMUNITY

## 2017-12-04 RX ORDER — POTASSIUM CHLORIDE 750 MG/1
CAPSULE, EXTENDED RELEASE ORAL
COMMUNITY
Start: 2017-11-08 | End: 2018-01-31 | Stop reason: SDUPTHER

## 2017-12-04 RX ORDER — CETIRIZINE HYDROCHLORIDE 10 MG/1
10 TABLET ORAL EVERY MORNING
COMMUNITY
End: 2017-12-08 | Stop reason: HOSPADM

## 2017-12-04 RX ORDER — FAMCICLOVIR 125 MG/1
125 TABLET, FILM COATED ORAL EVERY MORNING
COMMUNITY
Start: 2016-08-23 | End: 2018-02-15 | Stop reason: HOSPADM

## 2017-12-04 RX ORDER — PANTOPRAZOLE SODIUM 40 MG/1
40 TABLET, DELAYED RELEASE ORAL 2 TIMES DAILY
COMMUNITY
Start: 2012-03-28

## 2017-12-04 RX ORDER — WARFARIN SODIUM 5 MG/1
5 TABLET ORAL
Status: DISCONTINUED | OUTPATIENT
Start: 2017-12-04 | End: 2017-12-04 | Stop reason: SDUPTHER

## 2017-12-04 RX ORDER — FINASTERIDE 5 MG/1
5 TABLET, FILM COATED ORAL
COMMUNITY
Start: 2016-08-23

## 2017-12-04 RX ORDER — TAMSULOSIN HYDROCHLORIDE 0.4 MG/1
0.4 CAPSULE ORAL
Status: DISCONTINUED | OUTPATIENT
Start: 2017-12-05 | End: 2017-12-08 | Stop reason: HOSPADM

## 2017-12-04 RX ORDER — WARFARIN SODIUM 5 MG/1
5 TABLET ORAL
COMMUNITY
Start: 2016-08-23 | End: 2017-12-08 | Stop reason: HOSPADM

## 2017-12-04 RX ORDER — AZITHROMYCIN 250 MG/1
500 TABLET, FILM COATED ORAL ONCE
Status: COMPLETED | OUTPATIENT
Start: 2017-12-04 | End: 2017-12-04

## 2017-12-04 RX ORDER — TOLTERODINE 2 MG/1
2 CAPSULE, EXTENDED RELEASE ORAL DAILY
Status: DISCONTINUED | OUTPATIENT
Start: 2017-12-05 | End: 2017-12-08 | Stop reason: HOSPADM

## 2017-12-04 RX ORDER — FLUTICASONE PROPIONATE 50 MCG
SPRAY, SUSPENSION (ML) NASAL
COMMUNITY
Start: 2013-05-16 | End: 2017-12-04

## 2017-12-04 RX ORDER — WARFARIN SODIUM 5 MG/1
5 TABLET ORAL
Status: DISCONTINUED | OUTPATIENT
Start: 2017-12-06 | End: 2017-12-08 | Stop reason: HOSPADM

## 2017-12-04 RX ORDER — GABAPENTIN 100 MG/1
100 CAPSULE ORAL DAILY
Status: DISCONTINUED | OUTPATIENT
Start: 2017-12-05 | End: 2017-12-08 | Stop reason: HOSPADM

## 2017-12-04 RX ORDER — GALANTAMINE HYDROBROMIDE 4 MG/1
TABLET, FILM COATED ORAL
COMMUNITY
Start: 2011-05-03 | End: 2017-12-04

## 2017-12-04 RX ORDER — OSELTAMIVIR PHOSPHATE 75 MG/1
75 CAPSULE ORAL EVERY 24 HOURS
Status: DISCONTINUED | OUTPATIENT
Start: 2017-12-04 | End: 2017-12-05

## 2017-12-04 RX ORDER — FUROSEMIDE 40 MG/1
40 TABLET ORAL EVERY MORNING
Status: DISCONTINUED | OUTPATIENT
Start: 2017-12-05 | End: 2017-12-04

## 2017-12-04 RX ORDER — FLECAINIDE ACETATE 50 MG/1
50 TABLET ORAL EVERY MORNING
Status: DISCONTINUED | OUTPATIENT
Start: 2017-12-05 | End: 2017-12-08 | Stop reason: HOSPADM

## 2017-12-04 RX ADMIN — CEFEPIME HYDROCHLORIDE 2000 MG: 2 INJECTION, POWDER, FOR SOLUTION INTRAVENOUS at 18:04

## 2017-12-04 RX ADMIN — SODIUM CHLORIDE 500 ML: 0.9 INJECTION, SOLUTION INTRAVENOUS at 17:42

## 2017-12-04 RX ADMIN — TRAZODONE HYDROCHLORIDE 50 MG: 50 TABLET ORAL at 21:47

## 2017-12-04 RX ADMIN — AZITHROMYCIN 500 MG: 250 TABLET, FILM COATED ORAL at 18:01

## 2017-12-04 RX ADMIN — FINASTERIDE 5 MG: 5 TABLET, FILM COATED ORAL at 21:47

## 2017-12-04 RX ADMIN — WARFARIN SODIUM 2.5 MG: 2.5 TABLET ORAL at 23:32

## 2017-12-04 RX ADMIN — VANCOMYCIN HYDROCHLORIDE 1250 MG: 1 INJECTION, POWDER, LYOPHILIZED, FOR SOLUTION INTRAVENOUS at 18:41

## 2017-12-04 RX ADMIN — PANTOPRAZOLE SODIUM 40 MG: 40 TABLET, DELAYED RELEASE ORAL at 21:47

## 2017-12-04 RX ADMIN — OSELTAMIVIR PHOSPHATE 75 MG: 75 CAPSULE ORAL at 21:47

## 2017-12-04 RX ADMIN — DONEPEZIL HYDROCHLORIDE 5 MG: 5 TABLET, FILM COATED ORAL at 21:47

## 2017-12-04 RX ADMIN — SODIUM CHLORIDE, POTASSIUM CHLORIDE, SODIUM LACTATE AND CALCIUM CHLORIDE 250 ML: 600; 310; 30; 20 INJECTION, SOLUTION INTRAVENOUS at 21:48

## 2017-12-04 NOTE — ED NOTES
Per Dr Ness Britt, no urine needed prior to abx administration  Per Dr Ness Britt he will order blood cultures at this time       Osmar Colorado RN  12/04/17 [id number]

## 2017-12-04 NOTE — ED PROVIDER NOTES
History  Chief Complaint   Patient presents with    Weakness - Generalized     malaise, fever 99 0 EMS  denies SOB, denies CP     Mr  Alirio Almanza is a 81yo M who has multiple comorbidites that presents to the ED for fatigue, weakness, and overall malaise  Daughter states that symptoms and a low grade fever started on   Patient received flu vaccine this year, but PCP was contacted by phone and Tamiflu was prescribed  Over the weekend, patient improved and was beginning to feel better  However, this morning, nursing staff alerted his daughter that he had a low grade fever, more confused, and exhibiting weakness again  EMS was called and patient was transferred to ED  Patient states that he "just doesn't feel well" and is "tired all the time"  He has not had any nausea, vomiting, diarrhea, or other complaints at this time  Prior to Admission Medications   Prescriptions Last Dose Informant Patient Reported? Taking? Oseltamivir Phosphate (TAMIFLU PO)   Yes Yes   Sig: Take by mouth daily   acetaminophen (TYLENOL) 325 mg tablet  Child No Yes   Sig: Take 2 tablets by mouth every 6 (six) hours as needed for fever  cetirizine (ZyrTEC) 10 mg tablet 2017 at 0800 Child Yes Yes   Sig: Take 10 mg by mouth every morning   cycloSPORINE (RESTASIS) 0 05 % ophthalmic emulsion 2017 at 0800 Child Yes Yes   Sig: Administer 1 drop to both eyes 2 (two) times a day  famciclovir (FAMVIR) 125 MG tablet 2017 at 0800 Child Yes Yes   Sig: Take 125 mg by mouth every morning     finasteride (PROSCAR) 5 mg tablet 12/3/2017 at 2000 Child Yes Yes   Sig: Take 5 mg by mouth daily at bedtime     flecainide (TAMBOCOR) 50 mg tablet 2017 at 0800 Child Yes Yes   Sig: Take 50 mg by mouth every morning     fluticasone (FLONASE) 50 mcg/act nasal spray  Child No Yes   Si sprays into each nostril daily for 30 days     furosemide (LASIX) 40 mg tablet 2017 at 0800 Child Yes Yes   Sig: Take 40 mg by mouth every morning gabapentin (NEURONTIN) 100 mg capsule 12/3/2017 at 2000 Child Yes Yes   Sig: Take 100 mg by mouth daily   galantamine (RAZADYNE) 4 mg tablet 2017 at 0800 Child Yes Yes   Sig: Take 4 mg by mouth daily  pantoprazole (PROTONIX) 40 mg tablet  Child Yes Yes   Sig: Take 40 mg by mouth 2 (two) times a day     potassium chloride (MICRO-K) 10 MEQ CR capsule  Child Yes Yes   Sig: Take by mouth   sodium chloride (OCEAN) 0 65 % nasal spray   No No   Si sprays into each nostril as needed for congestion for up to 30 days  solifenacin (VESICARE) 5 mg tablet 12/3/2017 at 2000 Child Yes Yes   Sig: Take 5 mg by mouth daily at bedtime     tamsulosin (FLOMAX) 0 4 mg  Child Yes Yes   Sig: Take 0 4 mg by mouth daily with dinner  traZODone (DESYREL) 50 mg tablet 12/3/2017 at 2000 Child Yes Yes   Sig: Take 50 mg by mouth daily at bedtime     warfarin (COUMADIN) 2 5 mg tablet  Child No Yes   Sig: Take 1 tablet by mouth daily for 30 days  Wednesday   Patient taking differently: Take 2 5 mg by mouth every 4(four) days Wednesday     warfarin (COUMADIN) 5 mg tablet  Child Yes Yes   Sig: Take 5 mg by mouth daily For 3 days       Facility-Administered Medications: None       Past Medical History:   Diagnosis Date    Atrial fibrillation (HCC)     Coronary artery disease     DVT (deep venous thrombosis) (HCC)     Pacemaker     S/P IVC filter     SSS (sick sinus syndrome) ([facility] )        Past Surgical History:   Procedure Laterality Date    APPENDECTOMY      CHOLECYSTECTOMY         Family History   Problem Relation Age of Onset    Family history unknown: Yes     I have reviewed and agree with the history as documented  Social History   Substance Use Topics    Smoking status: Former Smoker     Types: Cigarettes     Quit date: 1950    Smokeless tobacco: Never Used    Alcohol use No        Review of Systems   Constitutional: Positive for activity change, fatigue and fever     HENT: Positive for congestion and postnasal drip     Eyes: Negative  Respiratory: Positive for cough  Negative for shortness of breath and wheezing  Cardiovascular: Positive for leg swelling  Negative for chest pain and palpitations  Gastrointestinal: Negative for abdominal pain, diarrhea, nausea and vomiting  Endocrine: Negative  Genitourinary: Positive for difficulty urinating, dysuria and urgency  Musculoskeletal: Negative  Skin: Positive for wound  Neurological: Positive for weakness  Negative for dizziness, syncope, light-headedness and headaches  Psychiatric/Behavioral: Positive for confusion  Physical Exam  ED Triage Vitals   Temperature Pulse Respirations Blood Pressure SpO2   12/04/17 1538 12/04/17 1538 12/04/17 1538 12/04/17 1538 12/04/17 1538   99 °F (37 2 °C) 90 18 114/54 95 %      Temp Source Heart Rate Source Patient Position - Orthostatic VS BP Location FiO2 (%)   12/04/17 1538 12/04/17 1742 12/04/17 1538 12/04/17 1538 --   Oral Monitor Lying Right arm       Pain Score       12/04/17 1538       No Pain           Orthostatic Vital Signs  Vitals:    12/04/17 1538 12/04/17 1742   BP: 114/54 120/58   Pulse: 90 78   Patient Position - Orthostatic VS: Lying Sitting       Physical Exam   Constitutional: He is oriented to person, place, and time  He appears well-developed and well-nourished  No distress  HENT:   Head: Normocephalic and atraumatic  Eyes: EOM are normal  Pupils are equal, round, and reactive to light  Neck: Normal range of motion  Neck supple  Cardiovascular: Normal rate, regular rhythm, normal heart sounds and intact distal pulses  Pulmonary/Chest: Effort normal    Abdominal: Soft  There is no tenderness  There is no guarding  Musculoskeletal: Normal range of motion  He exhibits edema  Neurological: He is alert and oriented to person, place, and time  Skin: Skin is warm and dry  Psychiatric: He has a normal mood and affect         ED Medications  Medications   vancomycin (VANCOCIN) 1,250 mg in sodium chloride 0 9 % 250 mL IVPB (not administered)   cefepime (MAXIPIME) 2 g/50 mL dextrose IVPB (2,000 mg Intravenous New Bag 12/4/17 1804)   sodium chloride 0 9 % bolus 500 mL (500 mL Intravenous New Bag 12/4/17 1742)   azithromycin (ZITHROMAX) tablet 500 mg (500 mg Oral Given 12/4/17 1801)       Diagnostic Studies  Results Reviewed     Procedure Component Value Units Date/Time    UA (URINE) with reflex to Microscopic [[id number]]  (Normal) Collected:  12/04/17 1754    Lab Status:  Final result Specimen:  Urine from Urine, Clean Catch Updated:  12/04/17 1819     Color, UA Yellow     Clarity, UA Clear     Specific Gravity, UA 1 010     pH, UA 6 5     Leukocytes, UA Negative     Nitrite, UA Negative     Protein, UA Negative mg/dl      Glucose, UA Negative mg/dl      Ketones, UA Negative mg/dl      Urobilinogen, UA 0 2 E U /dl      Bilirubin, UA Negative     Blood, UA Negative    Blood culture [[id number]] Collected:  12/04/17 1754    Lab Status: In process Specimen:  Blood from Arm, Left Updated:  12/04/17 1801    [place] draw [[id number]] Collected:  12/04/17 1623    Lab Status: In process Specimen:  Blood Updated:  12/04/17 1801    Narrative: The following orders were created for panel order [place] draw  Procedure                               Abnormality         Status                     ---------                               -----------         ------                     Pat Avni Top on GPCG[[id number]]                            Final result               Gold top on GULT[[id number]]                                  In process                 Green / Black tube on SSKA[[id number]]                        Final result                 Please view results for these tests on the individual orders  Blood culture [[id number]] Collected:  12/04/17 1620    Lab Status:   In process Specimen:  Blood from Arm, Right Updated:  12/04/17 1751    Influenza A/B and RSV by PCR (indicated for patients >2 mo of age) [[id number]] Collected:  12/04/17 1747    Lab Status: In process Specimen:  Nasopharyngeal from Nasopharyngeal Swab Updated:  12/04/17 1751    Protime-INR [[id number]]  (Abnormal) Collected:  12/04/17 1623    Lab Status:  Final result Specimen:  Blood from Arm, Right Updated:  12/04/17 1744     Protime 24 1 (H) seconds      INR 2 08 (H)    Lactic acid, plasma [[id number]]  (Normal) Collected:  12/04/17 1623    Lab Status:  Final result Specimen:  Blood from Arm, Right Updated:  12/04/17 1656     LACTIC ACID 1 4 mmol/L     Narrative:         Result may be elevated if tourniquet was used during collection  Comprehensive metabolic panel [[id number]]  (Abnormal) Collected:  12/04/17 1623    Lab Status:  Final result Specimen:  Blood from Arm, Right Updated:  12/04/17 1650     Sodium 138 mmol/L      Potassium 3 8 mmol/L      Chloride 103 mmol/L      CO2 27 mmol/L      Anion Gap 8 mmol/L      BUN 22 mg/dL      Creatinine 1 32 (H) mg/dL      Glucose 116 mg/dL      Calcium 8 8 mg/dL      AST 12 U/L      ALT 17 U/L      Alkaline Phosphatase 60 U/L      Total Protein 6 2 (L) g/dL      Albumin 2 7 (L) g/dL      Total Bilirubin 0 40 mg/dL      eGFR 45 ml/min/1 73sq m     Narrative:         National Kidney Disease Education Program recommendations are as follows:  GFR calculation is accurate only with a steady state creatinine  Chronic Kidney disease less than 60 ml/min/1 73 sq  meters  Kidney failure less than 15 ml/min/1 73 sq  meters      CBC and differential [[id number]]  (Abnormal) Collected:  12/04/17 1623    Lab Status:  Final result Specimen:  Blood from Arm, Right Updated:  12/04/17 1629     WBC 11 36 (H) Thousand/uL      RBC 3 93 Million/uL      Hemoglobin 11 3 (L) g/dL      Hematocrit 34 9 (L) %      MCV 89 fL      MCH 28 8 pg      MCHC 32 4 g/dL      RDW 13 5 %      MPV 9 7 fL      Platelets 337 Thousands/uL      Neutrophils Relative 80 (H) %      Lymphocytes Relative 13 (L) %      Monocytes Relative 7 % Eosinophils Relative 0 %      Basophils Relative 0 %      Neutrophils Absolute 9 05 (H) Thousands/µL      Lymphocytes Absolute 1 50 Thousands/µL      Monocytes Absolute 0 75 Thousand/µL      Eosinophils Absolute 0 05 Thousand/µL      Basophils Absolute 0 01 Thousands/µL                  XR chest 2 views   Final Result by Dane Ledezma MD (12/04 1725)      Right upper and left lower lobe consolidation suggesting pneumonia  Workstation performed: [id number]                    Procedures  Procedures       Phone Contacts  ED Phone Contact    ED Course  ED Course                          Initial Sepsis Screening     [address] Name 12/04/17 1735                Is the patient's history suggestive of a new or worsening infection? (!)  Yes (Proceed)  -MB        Suspected source of infection pneumonia  -MB        Are two or more of the following signs & symptoms of infection both present and new to the patient?         Indicate SIRS criteria Altered mental status  -MB        If the answer is yes to both questions, suspicion of sepsis is present          If severe sepsis is present AND tissue hypoperfusion perists in the hour after fluid resuscitation or lactate > 4, the patient meets criteria for SEPTIC SHOCK          Are any of the following organ dysfunction criteria present within 6 hours of suspected infection and SIRS criteria that are NOT considered to be chronic conditions? No  -MB        Organ dysfunction          Date of presentation of severe sepsis          Time of presentation of severe sepsis          Tissue hypoperfusion persists in the hour after crystalloid fluid administration, evidenced, by either:          Was hypotension present within one hour of the conclusion of crystalloid fluid administration?           Date of presentation of septic shock          Time of presentation of septic shock            User Key  (r) = Recorded By, (t) = Taken By, (c) = Cosigned By    Initials Name Provider Type JACINTA Mccabe MD Physician                  MDM  Number of Diagnoses or Management Options  Pneumonia of both lungs due to infectious organism, unspecified part of lung:      Amount and/or Complexity of Data Reviewed  Clinical lab tests: ordered  Tests in the radiology section of CPT®: ordered  Review and summarize past medical records: yes  Independent visualization of images, tracings, or specimens: yes    Risk of Complications, Morbidity, and/or Mortality  Presenting problems: moderate  Diagnostic procedures: moderate  Management options: moderate  General comments: X-ray demonstrated right upper and left lower lobe consolidation, suggesting pneumonia  Due to low grade fever and severe weakness/fatigue, patient will be admitted for IV antibiotics  CritCare Time    Disposition  Final diagnoses:   Pneumonia of both lungs due to infectious organism, unspecified part of lung     Time reflects when diagnosis was documented in both MDM as applicable and the Disposition within this note     Time User Action Codes Description Comment    12/4/2017  6:03 PM Rosi Taylor Add [J18 [id number]] Pneumonia of both lungs due to infectious organism, unspecified part of lung       ED Disposition     ED Disposition Condition Comment    Admit  Case was discussed with dr Nolan Doherty and the patient's admission status was agreed to be Admission Status: inpatient status to the service of Dr Kota Saucedo   Follow-up Information    None       Patient's Medications   Discharge Prescriptions    No medications on file     No discharge procedures on file      ED Provider  Electronically Signed by           Rosi Taylor PA-C  12/04/17 8815

## 2017-12-04 NOTE — ED PROVIDER NOTES
History  Chief Complaint   Patient presents with    Weakness - Generalized     malaise, fever 99 0 EMS  denies SOB, denies CP     80 yr male at assisted living- sicne Friday with cough - gen weakness- cough productive [place] sputum -- did get flu vaccine thsi season - but started on tamiflu Friday -- daughter relates intermitetn confusion and increasing generalized weakness sicne then- pt deneis any gu comps- pt has healing rle wound as per glory- much improved- no other comps        History provided by:  Patient and spouse  History limited by:  Mental status change   used: No        Prior to Admission Medications   Prescriptions Last Dose Informant Patient Reported? Taking?   acetaminophen (TYLENOL) 325 mg tablet   No No   Sig: Take 2 tablets by mouth every 6 (six) hours as needed for fever  cycloSPORINE (RESTASIS) 0 05 % ophthalmic emulsion   Yes No   Sig: Administer 1 drop to both eyes 2 (two) times a day  famciclovir (FAMVIR) 125 MG tablet   Yes Yes   Sig: Take by mouth   fexofenadine (ALLEGRA) 180 MG tablet   Yes No   Sig: Take by mouth   finasteride (PROSCAR) 5 mg tablet   Yes No   Sig: Take 5 mg by mouth daily  finasteride (PROSCAR) 5 mg tablet   Yes Yes   Sig: Take by mouth   flecainide (TAMBOCOR) 50 mg tablet   No No   Sig: Take 1 tablet by mouth daily for 30 days  flecainide (TAMBOCOR) 50 mg tablet   Yes Yes   Sig: Take by mouth   fluticasone (FLONASE) 50 mcg/act nasal spray   No No   Si sprays into each nostril daily for 30 days  fluticasone (FLONASE) 50 mcg/act nasal spray   Yes Yes   Sig: into each nostril   furosemide (LASIX) 40 mg tablet   Yes Yes   Sig: Take by mouth   gabapentin (NEURONTIN) 100 mg capsule   Yes Yes   Sig: Take 100 mg by mouth daily   galantamine (RAZADYNE) 4 mg tablet   Yes No   Sig: Take 4 mg by mouth daily     galantamine (RAZADYNE) 4 mg tablet   Yes Yes   Sig: Take by mouth   pantoprazole (PROTONIX) 20 mg tablet   Yes No   Sig: Take 20 mg by mouth daily  pantoprazole (PROTONIX) 40 mg tablet   Yes Yes   Sig: Take by mouth   potassium chloride (MICRO-K) 10 MEQ CR capsule   Yes Yes   Sig: Take by mouth   sodium chloride (OCEAN) 0 65 % nasal spray   No No   Si sprays into each nostril as needed for congestion for up to 30 days  solifenacin (VESICARE) 5 mg tablet   Yes Yes   Sig: Take by mouth   tamsulosin (FLOMAX) 0 4 mg   Yes No   Sig: Take 0 4 mg by mouth daily with dinner  tamsulosin (FLOMAX) 0 4 mg   Yes Yes   Sig: Take by mouth   traZODone (DESYREL) 50 mg tablet   Yes Yes   Sig: Take by mouth   warfarin (COUMADIN) 2 5 mg tablet   No No   Sig: Take 1 tablet by mouth daily for 30 days  Wednesday   Patient taking differently: Take 2 5 mg by mouth every 4(four) days Wednesday     warfarin (COUMADIN) 5 mg tablet   Yes Yes   Sig: Take 5 mg by mouth daily For 3 days       Facility-Administered Medications: None       Past Medical History:   Diagnosis Date    Atrial fibrillation (HCC)     Coronary artery disease     [facility])     Pacemaker     S/P IVC filter     [facility]        Past Surgical History:   Procedure Laterality Date    APPENDECTOMY      CHOLECYSTECTOMY         Family History   Problem Relation Age of Onset    Family history unknown: Yes     I have reviewed and agree with the history as documented  Social History   Substance Use Topics    Smoking status: Former Smoker     Types: Cigarettes     Quit date: 1950    Smokeless tobacco: Never Used    Alcohol use No        Review of Systems   Constitutional: Positive for activity change, appetite change, chills and fatigue  Negative for diaphoresis, fever and unexpected weight change  HENT: Negative  Eyes: Negative  Respiratory: Positive for cough  Negative for apnea, choking, chest tightness, shortness of breath, wheezing and stridor  Cardiovascular: Negative  Gastrointestinal: Negative  Endocrine: Negative  Genitourinary: Negative  Musculoskeletal: Positive for gait problem  Negative for arthralgias, back pain, joint swelling, myalgias and neck pain  Skin: Negative  Allergic/Immunologic: Negative  Hematological: Negative  Psychiatric/Behavioral: Positive for confusion  Negative for agitation, behavioral problems, decreased concentration, dysphoric mood, hallucinations, self-injury, sleep disturbance and suicidal ideas  The patient is not nervous/anxious and is not hyperactive  Physical Exam  ED Triage Vitals [12/04/17 1538]   Temperature Pulse Respirations Blood Pressure SpO2   99 °F (37 2 °C) 90 18 114/54 95 %      Temp Source Heart Rate Source Patient Position - Orthostatic VS BP Location FiO2 (%)   Oral -- Lying Right arm --      Pain Score       No Pain           Orthostatic Vital Signs  Vitals:    12/04/17 1538   BP: 114/54   Pulse: 90   Patient Position - Orthostatic VS: Lying       Physical Exam   Constitutional: No distress  avss-- pulse ox 93 % on ra- intepretation is low-  No intervention   HENT:   Head: Normocephalic and atraumatic  Eyes: Conjunctivae and EOM are normal  Pupils are equal, round, and reactive to light  Right eye exhibits no discharge  Left eye exhibits no discharge  No scleral icterus  Neck: Normal range of motion  Neck supple  No JVD present  No tracheal deviation present  No thyromegaly present  Cardiovascular: Normal rate and regular rhythm  Exam reveals no gallop and no friction rub  Murmur heard  Pulmonary/Chest: Effort normal  No stridor  No respiratory distress  He has no wheezes  He has no rales  He exhibits no tenderness  bibasialr rhonic   Abdominal: Soft  Bowel sounds are normal  He exhibits no distension and no mass  There is no tenderness  There is no rebound and no guarding  No hernia  Musculoskeletal: He exhibits edema  He exhibits no tenderness or deformity     Equal bialteral radial/dp pulses- pt has ble edema- has compression stockings on -- healign r lower ant tibial wound-- no surroundign signs of infection    Lymphadenopathy:     He has no cervical adenopathy  Neurological: He is alert  No cranial nerve deficit or sensory deficit  He exhibits normal muscle tone  Coordination normal    aand ox 2 -    Skin: Skin is warm  Capillary refill takes less than 2 seconds  He is not diaphoretic  No erythema  No pallor  Psychiatric: He has a normal mood and affect  His behavior is normal    Nursing note and vitals reviewed  ED Medications  Medications - No data to display    Diagnostic Studies  Results Reviewed     Procedure Component Value Units Date/Time    Lactic acid, plasma [[id number]]  (Normal) Collected:  12/04/17 1623    Lab Status:  Final result Specimen:  Blood from Arm, Right Updated:  12/04/17 1656     LACTIC ACID 1 4 mmol/L     Narrative:         Result may be elevated if tourniquet was used during collection  Comprehensive metabolic panel [[id number]]  (Abnormal) Collected:  12/04/17 1623    Lab Status:  Final result Specimen:  Blood from Arm, Right Updated:  12/04/17 1650     Sodium 138 mmol/L      Potassium 3 8 mmol/L      Chloride 103 mmol/L      CO2 27 mmol/L      Anion Gap 8 mmol/L      BUN 22 mg/dL      Creatinine 1 32 (H) mg/dL      Glucose 116 mg/dL      Calcium 8 8 mg/dL      AST 12 U/L      ALT 17 U/L      Alkaline Phosphatase 60 U/L      Total Protein 6 2 (L) g/dL      Albumin 2 7 (L) g/dL      Total Bilirubin 0 40 mg/dL      eGFR 45 ml/min/1 73sq m     Narrative:         National Kidney Disease Education Program recommendations are as follows:  GFR calculation is accurate only with a steady state creatinine  Chronic Kidney disease less than 60 ml/min/1 73 sq  meters  Kidney failure less than 15 ml/min/1 73 sq  meters      CBC and differential [[id number]]  (Abnormal) Collected:  12/04/17 1623    Lab Status:  Final result Specimen:  Blood from Arm, Right Updated:  12/04/17 1629     WBC 11 36 (H) Thousand/uL      RBC 3 93 Million/uL      Hemoglobin 11 3 (L) g/dL      Hematocrit 34 9 (L) %      MCV 89 fL      MCH 28 8 pg      MCHC 32 4 g/dL      RDW 13 5 %      MPV 9 7 fL      Platelets 868 Thousands/uL      Neutrophils Relative 80 (H) %      Lymphocytes Relative 13 (L) %      Monocytes Relative 7 %      Eosinophils Relative 0 %      Basophils Relative 0 %      Neutrophils Absolute 9 05 (H) Thousands/µL      Lymphocytes Absolute 1 50 Thousands/µL      Monocytes Absolute 0 75 Thousand/µL      Eosinophils Absolute 0 05 Thousand/µL      Basophils Absolute 0 01 Thousands/µL     [place] draw [[id number]] Collected:  12/04/17 1623    Lab Status: In process Specimen:  Blood Updated:  12/04/17 1626    Narrative: The following orders were created for panel order [place] draw  Procedure                               Abnormality         Status                     ---------                               -----------         ------                     Sherra Sep Top on LGBR[[id number]]                            In process                 Gold top on NLYH[[id number]]                                  In process                 Green / Black tube on LSJG[[id number]]                        In process                   Please view results for these tests on the individual orders  UA (URINE) with reflex to Microscopic [[id number]]     Lab Status:  No result Specimen:  Urine                  XR chest 2 views   Final Result by Khalida Yeh MD (12/04 9585)      Right upper and left lower lobe consolidation suggesting pneumonia  Workstation performed: [id number]                    Procedures  Procedures       Phone Contacts  ED Phone Contact    ED Course  ED Course                                [facility]  CritCare Time    Disposition  Final diagnoses:   None     ED Disposition     None      Follow-up Information    None       Patient's Medications   Discharge Prescriptions    No medications on file     No discharge procedures on file      ED Provider  Electronically Signed by           Sada Bhandari MD  12/04/17 9134       Sada Bhandari MD  12/04/17 1186

## 2017-12-05 ENCOUNTER — APPOINTMENT (INPATIENT)
Dept: NON INVASIVE DIAGNOSTICS | Facility: HOSPITAL | Age: 82
DRG: 193 | End: 2017-12-05
Payer: MEDICARE

## 2017-12-05 LAB
ANION GAP SERPL CALCULATED.3IONS-SCNC: 7 MMOL/L (ref 4–13)
BUN SERPL-MCNC: 19 MG/DL (ref 5–25)
CALCIUM SERPL-MCNC: 8 MG/DL (ref 8.3–10.1)
CHLORIDE SERPL-SCNC: 106 MMOL/L (ref 100–108)
CO2 SERPL-SCNC: 27 MMOL/L (ref 21–32)
CREAT SERPL-MCNC: 1.09 MG/DL (ref 0.6–1.3)
ERYTHROCYTE [DISTWIDTH] IN BLOOD BY AUTOMATED COUNT: 13.7 % (ref 11.6–15.1)
FLUAV AG SPEC QL: NORMAL
FLUBV AG SPEC QL: NORMAL
GFR SERPL CREATININE-BSD FRML MDRD: 57 ML/MIN/1.73SQ M
GLUCOSE SERPL-MCNC: 100 MG/DL (ref 65–140)
HCT VFR BLD AUTO: 31 % (ref 36.5–49.3)
HGB BLD-MCNC: 9.8 G/DL (ref 12–17)
INR PPP: 2.35 (ref 0.86–1.16)
L PNEUMO1 AG UR QL IA.RAPID: NEGATIVE
MCH RBC QN AUTO: 28.4 PG (ref 26.8–34.3)
MCHC RBC AUTO-ENTMCNC: 31.6 G/DL (ref 31.4–37.4)
MCV RBC AUTO: 90 FL (ref 82–98)
PLATELET # BLD AUTO: 167 THOUSANDS/UL (ref 149–390)
PMV BLD AUTO: 9.8 FL (ref 8.9–12.7)
POTASSIUM SERPL-SCNC: 3.6 MMOL/L (ref 3.5–5.3)
PROTHROMBIN TIME: 26.6 SECONDS (ref 12.1–14.4)
RBC # BLD AUTO: 3.45 MILLION/UL (ref 3.88–5.62)
RSV B RNA SPEC QL NAA+PROBE: NORMAL
S PNEUM AG UR QL: NEGATIVE
SODIUM SERPL-SCNC: 140 MMOL/L (ref 136–145)
WBC # BLD AUTO: 6.87 THOUSAND/UL (ref 4.31–10.16)

## 2017-12-05 PROCEDURE — 80048 BASIC METABOLIC PNL TOTAL CA: CPT | Performed by: FAMILY MEDICINE

## 2017-12-05 PROCEDURE — 85027 COMPLETE CBC AUTOMATED: CPT | Performed by: FAMILY MEDICINE

## 2017-12-05 PROCEDURE — 87081 CULTURE SCREEN ONLY: CPT | Performed by: FAMILY MEDICINE

## 2017-12-05 PROCEDURE — 87449 NOS EACH ORGANISM AG IA: CPT | Performed by: FAMILY MEDICINE

## 2017-12-05 PROCEDURE — 85610 PROTHROMBIN TIME: CPT | Performed by: FAMILY MEDICINE

## 2017-12-05 PROCEDURE — 93306 TTE W/DOPPLER COMPLETE: CPT

## 2017-12-05 RX ORDER — AZITHROMYCIN 250 MG/1
500 TABLET, FILM COATED ORAL EVERY 24 HOURS
Status: DISCONTINUED | OUTPATIENT
Start: 2017-12-06 | End: 2017-12-08 | Stop reason: HOSPADM

## 2017-12-05 RX ADMIN — PANTOPRAZOLE SODIUM 40 MG: 40 TABLET, DELAYED RELEASE ORAL at 09:51

## 2017-12-05 RX ADMIN — TOLTERODINE TARTRATE 2 MG: 2 CAPSULE, EXTENDED RELEASE ORAL at 09:50

## 2017-12-05 RX ADMIN — GABAPENTIN 100 MG: 100 CAPSULE ORAL at 09:51

## 2017-12-05 RX ADMIN — AZITHROMYCIN MONOHYDRATE 500 MG: 500 INJECTION, POWDER, LYOPHILIZED, FOR SOLUTION INTRAVENOUS at 09:51

## 2017-12-05 RX ADMIN — TAMSULOSIN HYDROCHLORIDE 0.4 MG: 0.4 CAPSULE ORAL at 17:24

## 2017-12-05 RX ADMIN — PANTOPRAZOLE SODIUM 40 MG: 40 TABLET, DELAYED RELEASE ORAL at 17:25

## 2017-12-05 RX ADMIN — CEFTRIAXONE 1000 MG: 1 INJECTION, SOLUTION INTRAVENOUS at 21:23

## 2017-12-05 RX ADMIN — DONEPEZIL HYDROCHLORIDE 5 MG: 5 TABLET, FILM COATED ORAL at 21:22

## 2017-12-05 RX ADMIN — FLUTICASONE PROPIONATE 2 SPRAY: 50 SPRAY, METERED NASAL at 09:51

## 2017-12-05 RX ADMIN — CEFTRIAXONE 1000 MG: 1 INJECTION, SOLUTION INTRAVENOUS at 02:00

## 2017-12-05 RX ADMIN — FINASTERIDE 5 MG: 5 TABLET, FILM COATED ORAL at 21:22

## 2017-12-05 RX ADMIN — WARFARIN SODIUM 5 MG: 5 TABLET ORAL at 17:25

## 2017-12-05 RX ADMIN — TRAZODONE HYDROCHLORIDE 50 MG: 50 TABLET ORAL at 21:22

## 2017-12-05 RX ADMIN — FLECAINIDE ACETATE 50 MG: 50 TABLET ORAL at 09:50

## 2017-12-05 NOTE — PROGRESS NOTES
Pt lying comfortably in bed, appears to be in no signs of distress, denies any needs at this time, agree with prior nurse assessment, call bell within reach, will continue to monitor

## 2017-12-05 NOTE — PLAN OF CARE
INFECTION - ADULT     Absence or prevention of progression during hospitalization Progressing     Absence of fever/infection during neutropenic period Progressing        Nutrition/Hydration-ADULT     Nutrient/Hydration intake appropriate for improving, restoring or maintaining nutritional needs Progressing        Potential for Falls     Patient will remain free of falls Progressing        Prexisting or High Potential for Compromised Skin Integrity     Skin integrity is maintained or improved Progressing

## 2017-12-05 NOTE — PROGRESS NOTES
Progress Note Kristin Lawrence 80 y o  male MRN: [id number]    Unit/Bed#: -01 Encounter: [id number]        * CAP (community acquired pneumonia)   Assessment & Plan    · Community-acquired pneumonia  · Present on admission  · Continue IV antibiotics follow-up pending studies  · Influenza negative        Chronic atrial fibrillation ([address] )   Assessment & Plan    · Currently rate control  · Monitor INR        Ambulatory dysfunction   Assessment & Plan    · Case discussed with patient's son at bedside  · Patient lives in independent living but son unsure of his baseline  Patient reports he ambulates with walker at baseline currently max assist   · Will get PT eval          Pharmacologic: Warfarin (Coumadin)  Mechanical VTE Prophylaxis in Place: Yes    Patient Centered Rounds: I have performed bedside rounds with nursing staff today  Discussions with Specialists or Other Care Team Provider:     Education and Discussions with Family / Patient:  Patient's son at bedside    Time Spent for Care: 30 minutes  More than 50% of total time spent on counseling and coordination of care as described above  Current Length of Stay: 1 day(s)    Current Patient Status: Inpatient   Certification Statement: The patient will continue to require additional inpatient hospital stay due to Patient requires IV antibiotics and physical therapy evaluation prior to discharge home    Discharge Plan / Estimated Discharge Date:  Possible discharge 48 hours      Code Status: Level 3 - DNAR and DNI      Subjective:   Comfortable no pain feels very tired    Objective:     Vitals:   Temp (24hrs), Av 3 °F (36 8 °C), Min:97 7 °F (36 5 °C), Max:99 °F (37 2 °C)    HR:  [70-90] 70  Resp:  [16-20] 18  BP: (104-120)/(53-58) 113/56  SpO2:  [91 %-95 %] 93 %  Body mass index is 28 83 kg/m²  Input and Output Summary (last 24 hours):        Intake/Output Summary (Last 24 hours) at 17 1232  Last data filed at 17 0900   Gross per 24 hour   Intake              620 ml   Output                0 ml   Net              620 ml       Physical Exam:     Physical Exam   Constitutional: No distress  Cardiovascular: Normal rate  Exam reveals no gallop and no friction rub  No murmur heard  Pulmonary/Chest: Effort normal  No respiratory distress  He has no wheezes  He has no rales  Abdominal: Soft  He exhibits no distension  There is no tenderness  There is no rebound and no guarding  Neurological: He is alert  Skin: He is not diaphoretic         Additional Data:     Labs:      Results from last 7 days  Lab Units 12/05/17  0449 12/04/17  1623   WBC Thousand/uL 6 87 11 36*   HEMOGLOBIN g/dL 9 8* 11 3*   HEMATOCRIT % 31 0* 34 9*   PLATELETS Thousands/uL 167 191   NEUTROS PCT %  --  80*   LYMPHS PCT %  --  13*   MONOS PCT %  --  7   EOS PCT %  --  0       Results from last 7 days  Lab Units 12/05/17  0449 12/04/17  1623   SODIUM mmol/L 140 138   POTASSIUM mmol/L 3 6 3 8   CHLORIDE mmol/L 106 103   CO2 mmol/L 27 27   BUN mg/dL 19 22   CREATININE mg/dL 1 09 1 32*   CALCIUM mg/dL 8 0* 8 8   TOTAL PROTEIN g/dL  --  6 2*   BILIRUBIN TOTAL mg/dL  --  0 40   ALK PHOS U/L  --  60   ALT U/L  --  17   AST U/L  --  12   GLUCOSE RANDOM mg/dL 100 116       Results from last 7 days  Lab Units 12/05/17  0449   INR  2 35*         Recent Cultures (last 7 days):       Results from last 7 days  Lab Units 12/04/17  1747   INFLUENZA A PCR  None Detected   INFLUENZA B PCR  None Detected   RSV PCR  None Detected       Last 24 Hours Medication List:     cefTRIAXone 1,000 mg Intravenous Q24H   And      azithromycin 500 mg Intravenous Q24H   cycloSPORINE 1 drop Both Eyes BID   donepezil 5 mg Oral HS   finasteride 5 mg Oral HS   flecainide 50 mg Oral QAM   fluticasone 2 spray Nasal Daily   gabapentin 100 mg Oral Daily   oseltamivir 75 mg Oral Q24H   pantoprazole 40 mg Oral BID   tamsulosin 0 4 mg Oral Daily With Dinner   tolterodine 2 mg Oral Daily   traZODone 50 mg Oral HS   warfarin 2 5 mg Oral Once every 4 days   warfarin 5 mg Oral Once every 4 days   And      [START ON 12/6/2017] warfarin 5 mg Oral Once every 4 days   And      [START ON 12/7/2017] warfarin 5 mg Oral Once every 4 days        Today, Patient Was Seen By: Elizabeth Cazares MD    ** Please Note: Dragon 360 Dictation voice to text software may have been used in the creation of this document   **

## 2017-12-05 NOTE — CASE MANAGEMENT
Initial Clinical Review    Admission: Date/Time/Statement: 12/4/17 @ 1975 Jarrod Arevalo     Orders Placed This Encounter   Procedures    Inpatient Admission (expected length of stay for this patient is greater than two midnights)     Standing Status:   Standing     Number of Occurrences:   1     Order Specific Question:   Admitting Physician     Answer:   Yadira Maxwell [[id number]]     Order Specific Question:   Level of Care     Answer:   Med Surg [16]     Order Specific Question:   Estimated length of stay     Answer:   More than 2 Midnights     Order Specific Question:   Certification     Answer:   I certify that inpatient services are medically necessary for this patient for a duration of greater than two midnights  See H&P and MD Progress Notes for additional information about the patient's course of treatment  ED: Date/Time/Mode of Arrival:   ED Arrival Information     Expected Arrival Acuity Means of Arrival Escorted By Service Admission Type    - 12/4/2017 15:36 Urgent Ambulance [address] Urgent    Arrival Complaint    weakness          Chief Complaint:   Chief Complaint   Patient presents with    Weakness - Generalized     malaise, fever 99 0 EMS  denies SOB, denies CP       History of Illness: 81yo M who has multiple comorbidites that presents to the ED for fatigue, weakness, and overall malaise  Daughter states that symptoms and a low grade fever started on 12/1  Patient received flu vaccine this year, but PCP was contacted by phone and Tamiflu was prescribed  Over the weekend, patient improved and was beginning to feel better  However, this morning, nursing staff alerted his daughter that he had a low grade fever, more confused, and exhibiting weakness again  EMS was called and patient was transferred to ED  Patient states that he "just doesn't feel well" and is "tired all the time"  He has not had any nausea, vomiting, diarrhea, or other complaints at this time        ED Vital Signs:   ED Triage Vitals   Temperature Pulse Respirations Blood Pressure SpO2   12/04/17 1538 12/04/17 1538 12/04/17 1538 12/04/17 1538 12/04/17 1538   99 °F (37 2 °C) 90 18 114/54 95 %      Temp Source Heart Rate Source Patient Position - Orthostatic VS BP Location FiO2 (%)   12/04/17 1538 12/04/17 1742 12/04/17 1538 12/04/17 1538 --   Oral Monitor Lying Right arm       Pain Score       12/04/17 1538       No Pain        Wt Readings from Last 1 Encounters:   12/04/17 86 kg (189 lb 9 5 oz)     Abnormal Labs/Diagnostic Test Results: Pt/INR 24 1/2 08, Creat 1 32, WBC 11 36, H/H 11 3/34 9, Neutros PCT 80    CXR: Right upper and left lower lobe consolidation suggesting pneumonia     EKG: AV sequential or dual chamber electronic pacemaker     ED Treatment:   Medication Administration from 12/04/2017 1535 to 12/04/2017 1857       Date/Time Order Dose Route Action Action by Comments     12/04/2017 1841 vancomycin (VANCOCIN) 1,250 mg in sodium chloride 0 9 % 250 mL IVPB 1,250 mg Intravenous [address] Trell Bowman RN      12/04/2017 1838 cefepime (MAXIPIME) 2 g/50 mL dextrose IVPB 0 mg Intravenous Stopped Yanna Arellano RN      12/04/2017 1804 cefepime (MAXIPIME) 2 g/50 mL dextrose IVPB 2,000 mg Intravenous New Bag Trell Bowman RN      12/04/2017 1801 azithromycin (ZITHROMAX) tablet 500 mg 500 mg Oral Given Trell Bowman RN      12/04/2017 1839 sodium chloride 0 9 % bolus 500 mL 0 mL Intravenous Stopped Yanna Stinson RN      12/04/2017 1742 sodium chloride 0 9 % bolus 500 mL 500 mL Intravenous New Bag Yanna Stinson RN         Physical Exam   Constitutional: He is oriented to person, place, and time  He appears well-developed and well-nourished  No distress  Cardiovascular: Normal rate, regular rhythm, normal heart sounds and intact distal pulses  Pulmonary/Chest: Effort normal    Musculoskeletal: Normal range of motion  He exhibits edema  Past Medical/Surgical History:    Active Ambulatory Problems     Diagnosis Date Noted    CAP (community acquired pneumonia) 04/11/2016    Benign prostatic hyperplasia with lower urinary tract symptoms 04/11/2016    SSS (sick sinus syndrome) ([place] [address] ) 04/11/2016    Chronic atrial fibrillation ([place] [address] ) 04/11/2016    S/P cardiac pacemaker procedure 04/11/2016    GERD (gastroesophageal reflux disease) 04/11/2016    Ambulatory dysfunction 04/11/2016    Sinus infection 09/23/2016    Bilateral leg edema 09/23/2016     Resolved Ambulatory Problems     Diagnosis Date Noted    Deep vein thrombosis (DVT) of left lower extremity ([place] [address] ) 04/11/2016     Past Medical History:   Diagnosis Date    [facility])     Coronary artery disease     DVT (deep venous thrombosis) ([facility])     Pacemaker     S/P IVC filter     SSS (sick sinus syndrome) ([facility])        Admitting Diagnosis: Weakness [R53 1]  Pneumonia of both lungs due to infectious organism, unspecified part of lung [J18 9]    Age/Sex: 80 y o  male    Assessment/Plan:     Hospital Problem List:      Principal Problem:    CAP (community acquired pneumonia)  Active Problems:    SSS (sick sinus syndrome) ([facility])    Chronic atrial fibrillation ([place] [address] )    Ambulatory dysfunction    S/P cardiac pacemaker procedure    GERD (gastroesophageal reflux disease)        Plan for the Primary Problem(s):  CAP: elevated WBC, cough, plus X-Ray Right upper and left lower lobe consolidation suggesting pneumonia  -Start on IV antibiotic  -gentle IV fluid, 250 IVB Ringer L Once  -incentive spirometry  -follow blood culture  -Follow up influenza  BEAR:  Most likely secondary to acute infection versus hypovolemic state or diuretic use   -unknown reason why patient was on furosemide, I will DC for now as least 24 hrs   -follow up 2Decho  Chronic after fibrillation:  Continue home medication  -Flecainide  Consider cardiology evaluation if acute decompensation   Ambulatory dysfunction:  Consider PT evaluation   GERD:  Continue home medication  -cont PPI  BPH  cont Finasteride and Flomax   Neuropathy: cont Gabapentin  Dementia: cont Galantamine  -cont Trazodone? ??      Keratoconjunctivitis Sicca: cont eyes drops     VTE Prophylaxis: Warfarin (Coumadin)  / sequential compression device   Code Status: full code  POLST: There is no POLST form on file for this patient (pre-hospital)     Anticipated Length of Stay:  Patient will be admitted on an Inpatient basis with an anticipated length of stay of  > 2 midnights     Justification for Hospital Stay:  Community-acquired pneumonia    Admission Orders:  Inpatient/MedSurg  Nasal O2 @ 2L   Bilateral Sequential Compression Device  Scheduled Meds:   cefTRIAXone 1,000 mg Intravenous Q24H   And      azithromycin 500 mg Intravenous Q24H   cycloSPORINE 1 drop Both Eyes BID   donepezil 5 mg Oral HS   finasteride 5 mg Oral HS   flecainide 50 mg Oral QAM   fluticasone 2 spray Nasal Daily   gabapentin 100 mg Oral Daily   oseltamivir 75 mg Oral Q24H   pantoprazole 40 mg Oral BID   tamsulosin 0 4 mg Oral Daily With Dinner   tolterodine 2 mg Oral Daily   traZODone 50 mg Oral HS   warfarin 2 5 mg Oral Once every 4 days   warfarin 5 mg Oral Once every 4 days   And      [START ON 12/6/2017] warfarin 5 mg Oral Once every 4 days   And      [START ON 12/7/2017] warfarin 5 mg Oral Once every 4 days

## 2017-12-05 NOTE — ASSESSMENT & PLAN NOTE
· Case discussed with patient's son at bedside  · Patient lives in independent living but son unsure of his baseline    Patient reports he ambulates with walker at baseline currently max assist   · Will get PT eval

## 2017-12-05 NOTE — ASSESSMENT & PLAN NOTE
· Community-acquired pneumonia  · Present on admission  · Continue IV antibiotics follow-up pending studies    · Influenza negative

## 2017-12-05 NOTE — H&P
History and Physical - [facility] Internal Medicine    Patient Information: Adrianna Jon 80 y o  male MRN: [id number]  Unit/Bed#: -01 Encounter: [id number]  Admitting Physician: Sarah Perdomo MD  PCP: Artemio Pedro DO  Date of Admission:  12/04/17    Assessment/Plan:    Hospital Problem List:     Principal Problem:    CAP (community acquired pneumonia)  Active Problems:    SSS (sick sinus syndrome) (H[facility])    Chronic atrial fibrillation ([address] )    Ambulatory dysfunction    S/P cardiac pacemaker procedure    GERD (gastroesophageal reflux disease)      Plan for the Primary Problem(s):  CAP: elevated WBC, cough, plus X-Ray Right upper and left lower lobe consolidation suggesting pneumonia  -Start on IV antibiotic  -gentle IV fluid, 250 IVB Ringer L Once  -incentive spirometry  -follow blood culture  -Follow up influenza  BEAR:  Most likely secondary to acute infection versus hypovolemic state or diuretic use   -unknown reason why patient was on furosemide, I will DC for now as least 24 hrs   -follow up 2Decho  Chronic after fibrillation:  Continue home medication  -Flecainide  Consider cardiology evaluation if acute decompensation   Ambulatory dysfunction:  Consider PT evaluation   GERD:  Continue home medication  -cont PPI  BPH  cont Finasteride and Flomax   Neuropathy: cont Gabapentin  Dementia: cont Galantamine  -cont Trazodone? ??     Keratoconjunctivitis Sicca: cont eyes drops        Plan for Additional Problems:   ·     VTE Prophylaxis: Warfarin (Coumadin)  / sequential compression device   Code Status: full code  POLST: There is no POLST form on file for this patient (pre-hospital)    Anticipated Length of Stay:  Patient will be admitted on an Inpatient basis with an anticipated length of stay of  > 2 midnights  Justification for Hospital Stay:  Community-acquired pneumonia  Total Time for Visit, including Counseling / Coordination of Care: 45 minutes    Greater than 50% of this total time spent on direct patient counseling and coordination of care  Chief Complaint:   General malaise    History of Present Illness:    Adrianna Jon is a 80 y o  male  with significant pass medical history of Cardiac Arritmia with pacemaker, Dementia, ADL dependent, who was brought by daughter after they found him in his living facility with decrease of energy  Daughter reported he started to have chills and dry cough for the last 5 days, with decrease appetite and energy level  She reports patient has been complaining that something is wrong with him  Family member is start the patient on furosemide do also suspect  CHF as per daughter reported  Review of Systems:    Review of Systems   Constitutional: Positive for chills and diaphoresis  HENT: Negative for congestion, dental problem and ear discharge  Eyes: Negative for pain, discharge and itching  Respiratory: Positive for cough  Negative for choking, chest tightness, shortness of breath, wheezing and stridor  Cardiovascular: Positive for leg swelling  Negative for chest pain  Gastrointestinal: Negative  Negative for abdominal distention, abdominal pain and anal bleeding  Endocrine: Negative for cold intolerance, heat intolerance and polydipsia  Genitourinary: Negative for difficulty urinating and dysuria  Musculoskeletal: Negative for arthralgias, back pain and gait problem  Neurological: Negative for dizziness, facial asymmetry, numbness and headaches  Hematological: Negative for adenopathy  Does not bruise/bleed easily  Psychiatric/Behavioral: Negative for agitation         Past Medical and Surgical History:     Past Medical History:   Diagnosis Date    Atrial fibrillation ([facility] )     Coronary artery disease     [facility]     Pacemaker     S/P IVC filter     SSS (sick sinus syndrome) ([facility] )        Past Surgical History:   Procedure Laterality Date    APPENDECTOMY      CHOLECYSTECTOMY Meds/Allergies:    Prior to Admission medications    Medication Sig Start Date End Date Taking? Authorizing Provider   acetaminophen (TYLENOL) 325 mg tablet Take 2 tablets by mouth every 6 (six) hours as needed for fever  9/24/16  Yes Eric Turcios MD   cetirizine (ZyrTEC) 10 mg tablet Take 10 mg by mouth every morning   Yes Historical Provider, MD   cycloSPORINE (RESTASIS) 0 05 % ophthalmic emulsion Administer 1 drop to both eyes 2 (two) times a day  Yes Historical Provider, MD   famciclovir (FAMVIR) 125 MG tablet Take 125 mg by mouth every morning   8/23/16  Yes Historical Provider, MD   finasteride (PROSCAR) 5 mg tablet Take 5 mg by mouth daily at bedtime   8/23/16  Yes Historical Provider, MD   flecainide (TAMBOCOR) 50 mg tablet Take 50 mg by mouth every morning   5/26/16  Yes Historical Provider, MD   fluticasone (FLONASE) 50 mcg/act nasal spray 2 sprays into each nostril daily for 30 days  9/24/16 12/4/17 Yes Eric Turcios MD   furosemide (LASIX) 40 mg tablet Take 40 mg by mouth every morning   11/8/17  Yes Historical Provider, MD   gabapentin (NEURONTIN) 100 mg capsule Take 100 mg by mouth daily   Yes Historical Provider, MD   galantamine (RAZADYNE) 4 mg tablet Take 4 mg by mouth daily  Yes Historical Provider, MD   Oseltamivir Phosphate (TAMIFLU PO) Take by mouth daily   Yes Historical Provider, MD   pantoprazole (PROTONIX) 40 mg tablet Take 40 mg by mouth 2 (two) times a day   3/28/12  Yes Historical Provider, MD   potassium chloride (MICRO-K) 10 MEQ CR capsule Take by mouth 11/8/17  Yes Historical Provider, MD   solifenacin (VESICARE) 5 mg tablet Take 5 mg by mouth daily at bedtime   3/12/15  Yes Historical Provider, MD   tamsulosin (FLOMAX) 0 4 mg Take 0 4 mg by mouth daily with dinner     Yes Historical Provider, MD   traZODone (DESYREL) 50 mg tablet Take 50 mg by mouth daily at bedtime   9/27/17  Yes Historical Provider, MD   warfarin (COUMADIN) 2 5 mg tablet Take 1 tablet by mouth daily for 30 days  Wednesday  Patient taking differently: Take 2 5 mg by mouth every 4(four) days Wednesday 9/25/16 12/4/17 Yes Carine Baumann MD   warfarin (COUMADIN) 5 mg tablet Take 5 mg by mouth daily For 3 days  8/23/16  Yes Historical Provider, MD   fluticasone [place]) 50 mcg/act nasal spray into each nostril 5/16/13 12/4/17 Yes Historical Provider, MD   galantamine (RAZADYNE) 4 mg tablet Take by mouth 5/3/11 12/4/17 Yes Historical Provider, MD   tamsulosin (FLOMAX) 0 4 mg Take by mouth 11/28/12 12/4/17 Yes Historical Provider, MD   sodium chloride (OCEAN) 0 65 % nasal spray 2 sprays into each nostril as needed for congestion for up to 30 days  9/24/16 10/24/16  Carine Baumann MD   fexofenadine (ALLEGRA) 180 MG tablet Take 180 mg by mouth daily    12/4/17  Historical Provider, MD   finasteride (PROSCAR) 5 mg tablet Take 5 mg by mouth daily  12/4/17  Historical Provider, MD   flecainide (TAMBOCOR) 50 mg tablet Take 1 tablet by mouth daily for 30 days  9/24/16 12/4/17  aCrine Baumann MD   pantoprazole (PROTONIX) 20 mg tablet Take 20 mg by mouth daily  12/4/17  Historical Provider, MD     I have reviewed home medications with patient personally  Allergies: Allergies   Allergen Reactions    Sulfa Antibiotics GI Intolerance     nausea    Celecoxib        Social History:     Marital Status:     Occupation:   Patient Pre-hospital Living Situation:   Patient Pre-hospital Level of Mobility:   Patient Pre-hospital Diet Restrictions:   Substance Use History:   History   Alcohol Use No     History   Smoking Status    Former Smoker    Types: Cigarettes    Quit date: 4/11/1950   Smokeless Tobacco    Never Used     History   Drug Use No       Family History:    non-contributory    Physical Exam:     Vitals:   Blood Pressure: 104/54 (12/04/17 1859)  Pulse: 89 (12/04/17 1859)  Temperature: 98 6 °F (37 °C) (12/04/17 1859)  Temp Source: Oral (12/04/17 1859)  Respirations: 16 (12/04/17 1859)  Height: 5' 8" (172 7 cm) (12/04/17 1859)  Weight - Scale: 86 kg (189 lb 9 5 oz) (12/04/17 1859)  SpO2: 91 % (12/04/17 1859)    Physical Exam   Constitutional: No distress  HENT:   Head: Normocephalic and atraumatic  Left Ear: External ear normal    Neck: Normal range of motion  Neck supple  No JVD present  No tracheal deviation present  No thyromegaly present  Cardiovascular: Normal rate  Exam reveals no gallop  Murmur heard  Irregular rhythm    Pulmonary/Chest: No respiratory distress  He has no wheezes  He has rales  He exhibits no tenderness  Abdominal: Soft  Bowel sounds are normal  He exhibits no distension and no mass  There is no tenderness  There is no rebound and no guarding  Musculoskeletal: He exhibits edema  He exhibits no tenderness or deformity  Lymphadenopathy:     He has no cervical adenopathy  Neurological: He is alert  He displays normal reflexes  He exhibits normal muscle tone  Coordination normal    No oriented x2  Ear aid need it    Skin: No rash noted  He is diaphoretic  Psychiatric: He has a normal mood and affect  Additional Data:     Lab Results: I have personally reviewed pertinent reports  Results from last 7 days  Lab Units 12/04/17  1623   WBC Thousand/uL 11 36*   HEMOGLOBIN g/dL 11 3*   HEMATOCRIT % 34 9*   PLATELETS Thousands/uL 191   NEUTROS PCT % 80*   LYMPHS PCT % 13*   MONOS PCT % 7   EOS PCT % 0       Results from last 7 days  Lab Units 12/04/17  1623   SODIUM mmol/L 138   POTASSIUM mmol/L 3 8   CHLORIDE mmol/L 103   CO2 mmol/L 27   BUN mg/dL 22   CREATININE mg/dL 1 32*   CALCIUM mg/dL 8 8   TOTAL PROTEIN g/dL 6 2*   BILIRUBIN TOTAL mg/dL 0 40   ALK PHOS U/L 60   ALT U/L 17   AST U/L 12   GLUCOSE RANDOM mg/dL 116       Results from last 7 days  Lab Units 12/04/17  1623   INR  2 08*       Imaging: I have personally reviewed pertinent reports  Xr Chest 2 Views    Result Date: 12/4/2017  Narrative: CHEST INDICATION: Cough   COMPARISON: 9/24/2016  VIEWS:  Frontal and lateral projections; 2 images FINDINGS: Right chest wall pacemaker is present  The cardiomediastinal silhouette is unremarkable  Right upper and left lower lobe consolidation is seen suggesting pneumonia  Osseous structures are age appropriate  Impression: Right upper and left lower lobe consolidation suggesting pneumonia  Workstation performed: [id number]       EKG, Pathology, and Other Studies Reviewed on Admission:   · EKG    Allscripts / Epic Records Reviewed: Yes     ** Please Note: This note has been constructed using a voice recognition system   **

## 2017-12-06 PROBLEM — G93.41 METABOLIC ENCEPHALOPATHY: Status: ACTIVE | Noted: 2017-12-06

## 2017-12-06 LAB
ANION GAP SERPL CALCULATED.3IONS-SCNC: 8 MMOL/L (ref 4–13)
BUN SERPL-MCNC: 19 MG/DL (ref 5–25)
CALCIUM SERPL-MCNC: 8.2 MG/DL (ref 8.3–10.1)
CHLORIDE SERPL-SCNC: 107 MMOL/L (ref 100–108)
CO2 SERPL-SCNC: 25 MMOL/L (ref 21–32)
CREAT SERPL-MCNC: 1.13 MG/DL (ref 0.6–1.3)
GFR SERPL CREATININE-BSD FRML MDRD: 54 ML/MIN/1.73SQ M
GLUCOSE SERPL-MCNC: 96 MG/DL (ref 65–140)
INR PPP: 2.33 (ref 0.86–1.16)
MRSA NOSE QL CULT: NORMAL
POTASSIUM SERPL-SCNC: 3.9 MMOL/L (ref 3.5–5.3)
PROTHROMBIN TIME: 26.4 SECONDS (ref 12.1–14.4)
SODIUM SERPL-SCNC: 140 MMOL/L (ref 136–145)

## 2017-12-06 PROCEDURE — 85610 PROTHROMBIN TIME: CPT | Performed by: INTERNAL MEDICINE

## 2017-12-06 PROCEDURE — 80048 BASIC METABOLIC PNL TOTAL CA: CPT | Performed by: INTERNAL MEDICINE

## 2017-12-06 RX ADMIN — AZITHROMYCIN 500 MG: 250 TABLET, FILM COATED ORAL at 09:53

## 2017-12-06 RX ADMIN — FLECAINIDE ACETATE 50 MG: 50 TABLET ORAL at 09:53

## 2017-12-06 RX ADMIN — CEFTRIAXONE 1000 MG: 1 INJECTION, SOLUTION INTRAVENOUS at 22:16

## 2017-12-06 RX ADMIN — TOLTERODINE TARTRATE 2 MG: 2 CAPSULE, EXTENDED RELEASE ORAL at 09:53

## 2017-12-06 RX ADMIN — TRAZODONE HYDROCHLORIDE 50 MG: 50 TABLET ORAL at 22:16

## 2017-12-06 RX ADMIN — FINASTERIDE 5 MG: 5 TABLET, FILM COATED ORAL at 22:16

## 2017-12-06 RX ADMIN — GABAPENTIN 100 MG: 100 CAPSULE ORAL at 09:53

## 2017-12-06 RX ADMIN — TAMSULOSIN HYDROCHLORIDE 0.4 MG: 0.4 CAPSULE ORAL at 17:55

## 2017-12-06 RX ADMIN — PANTOPRAZOLE SODIUM 40 MG: 40 TABLET, DELAYED RELEASE ORAL at 09:53

## 2017-12-06 RX ADMIN — DONEPEZIL HYDROCHLORIDE 5 MG: 5 TABLET, FILM COATED ORAL at 22:16

## 2017-12-06 RX ADMIN — WARFARIN SODIUM 5 MG: 5 TABLET ORAL at 17:55

## 2017-12-06 RX ADMIN — FLUTICASONE PROPIONATE 2 SPRAY: 50 SPRAY, METERED NASAL at 15:32

## 2017-12-06 RX ADMIN — PANTOPRAZOLE SODIUM 40 MG: 40 TABLET, DELAYED RELEASE ORAL at 17:55

## 2017-12-06 NOTE — ASSESSMENT & PLAN NOTE
· Community-acquired pneumonia  · Present on admission  · Continue IV antibiotics follow-up pending studies    · Influenza, Legionella, strep antigen negative

## 2017-12-06 NOTE — ASSESSMENT & PLAN NOTE
· This was present on admission  · Evidence by mental status change with confusion  · Patient more alert today and comfortable  Per discussion with son mental status was at baseline yesterday after admission

## 2017-12-06 NOTE — ASSESSMENT & PLAN NOTE
· Case d/w daughter  · MS at baseline  · He is not generally very motivated and lives alone with services will get PT eval

## 2017-12-06 NOTE — PROGRESS NOTES
Progress Note Wanda Walter 80 y o  male MRN: [id number]    Unit/Bed#: -01 Encounter: [id number]        * CAP (community acquired pneumonia)   Assessment & Plan    · Community-acquired pneumonia  · Present on admission  · Continue IV antibiotics follow-up pending studies  · Influenza, Legionella, strep antigen negative        Chronic atrial fibrillation ([address] )   Assessment & Plan    · Currently rate controled  · Monitor INR  · Patient on finasteride        Ambulatory dysfunction   Assessment & Plan    · Case d/w daughter  · MS at baseline  · He is not generally very motivated and lives alone with services will get PT eval        Metabolic encephalopathy   Assessment & Plan    · This was present on admission  · Evidence by mental status change with confusion  · Patient more alert today and comfortable  Per discussion with son mental status was at baseline yesterday after admission  Pharmacologic: Warfarin (Coumadin)  Mechanical VTE Prophylaxis in Place: No    Patient Centered Rounds: I have performed bedside rounds with nursing staff today  Discussions with Specialists or Other Care Team Provider:     Education and Discussions with Family / Patient: case d/w daughter at bedside    Time Spent for Care: 20 minutes  More than 50% of total time spent on counseling and coordination of care as described above  Current Length of Stay: 2 day(s)    Current Patient Status: Inpatient   Certification Statement: The patient will continue to require additional inpatient hospital stay due to need for antibiotics    Discharge Plan / Estimated Discharge Date: possible d/c in AM      Code Status: Level 3 - DNAR and DNI      Subjective:   Comfortable NAD    Objective:     Vitals:   Temp (24hrs), Av 2 °F (36 8 °C), Min:97 6 °F (36 4 °C), Max:98 5 °F (36 9 °C)    HR:  [70-78] 78  Resp:  [18-20] 18  BP: ()/(50-54) 108/54  SpO2:  [90 %-94 %] 94 %  Body mass index is 28 83 kg/m²       Input and Output Summary (last 24 hours): Intake/Output Summary (Last 24 hours) at 12/06/17 0752  Last data filed at 12/05/17 2137   Gross per 24 hour   Intake              120 ml   Output              825 ml   Net             -705 ml       Physical Exam:     Physical Exam   Constitutional: No distress  Cardiovascular: Normal rate  Exam reveals no gallop and no friction rub  No murmur heard  Pulmonary/Chest: Effort normal  No respiratory distress  He has no wheezes  He has no rales  Abdominal: Soft  He exhibits no distension  There is no tenderness  There is no rebound and no guarding  Musculoskeletal: He exhibits edema  Neurological: He is alert  Skin: He is not diaphoretic  Additional Data:     Labs:      Results from last 7 days  Lab Units 12/05/17  0449 12/04/17  1623   WBC Thousand/uL 6 87 11 36*   HEMOGLOBIN g/dL 9 8* 11 3*   HEMATOCRIT % 31 0* 34 9*   PLATELETS Thousands/uL 167 191   NEUTROS PCT %  --  80*   LYMPHS PCT %  --  13*   MONOS PCT %  --  7   EOS PCT %  --  0       Results from last 7 days  Lab Units 12/06/17  0430  12/04/17  1623   SODIUM mmol/L 140  < > 138   POTASSIUM mmol/L 3 9  < > 3 8   CHLORIDE mmol/L 107  < > 103   CO2 mmol/L 25  < > 27   BUN mg/dL 19  < > 22   CREATININE mg/dL 1 13  < > 1 32*   CALCIUM mg/dL 8 2*  < > 8 8   TOTAL PROTEIN g/dL  --   --  6 2*   BILIRUBIN TOTAL mg/dL  --   --  0 40   ALK PHOS U/L  --   --  60   ALT U/L  --   --  17   AST U/L  --   --  12   GLUCOSE RANDOM mg/dL 96  < > 116   < > = values in this interval not displayed  Results from last 7 days  Lab Units 12/06/17  0430   INR  2 33*         Recent Cultures (last 7 days):       Results from last 7 days  Lab Units 12/05/17  0949 12/04/17  1754 12/04/17  1747 12/04/17  1620   BLOOD CULTURE   --  No Growth at 24 hrs   --  No Growth at 24 hrs     INFLUENZA A PCR   --   --  None Detected  --    INFLUENZA B PCR   --   --  None Detected  --    RSV PCR   --   --  None Detected  --    LEGIONELLA URINARY ANTIGEN  Negative  --   --   --        Last 24 Hours Medication List:     azithromycin 500 mg Oral Q24H   cefTRIAXone 1,000 mg Intravenous Q24H   cycloSPORINE 1 drop Both Eyes BID   donepezil 5 mg Oral HS   finasteride 5 mg Oral HS   flecainide 50 mg Oral QAM   fluticasone 2 spray Nasal Daily   gabapentin 100 mg Oral Daily   pantoprazole 40 mg Oral BID   tamsulosin 0 4 mg Oral Daily With Dinner   tolterodine 2 mg Oral Daily   traZODone 50 mg Oral HS   warfarin 2 5 mg Oral Once every 4 days   warfarin 5 mg Oral Once every 4 days   And      warfarin 5 mg Oral Once every 4 days   And      [START ON 12/7/2017] warfarin 5 mg Oral Once every 4 days        Today, Patient Was Seen By: Annika Jarquin MD    ** Please Note: Dragon 360 Dictation voice to text software may have been used in the creation of this document   **

## 2017-12-06 NOTE — PLAN OF CARE
Problem: Potential for Falls  Goal: Patient will remain free of falls  INTERVENTIONS:  - Assess patient frequently for physical needs  -  Identify cognitive and physical deficits and behaviors that affect risk of falls  -  [place] fall precautions as indicated by assessment   - Educate patient/family on patient safety including physical limitations  - Instruct patient to call for assistance with activity based on assessment  - Modify environment to reduce risk of injury  - Consider OT/PT consult to assist with strengthening/mobility   Outcome: Progressing      Problem: Prexisting or High Potential for Compromised Skin Integrity  Goal: Skin integrity is maintained or improved  INTERVENTIONS:  - Identify patients at risk for skin breakdown  - Assess and monitor skin integrity  - Assess and monitor nutrition and hydration status  - Monitor labs (i e  albumin)  - Assess for incontinence   - Turn and reposition patient  - Assist with mobility/ambulation  - Relieve pressure over bony prominences  - Avoid friction and shearing  - Provide appropriate hygiene as needed including keeping skin clean and dry  - Evaluate need for skin moisturizer/barrier cream  - Collaborate with interdisciplinary team (i e  Nutrition, Rehabilitation, etc )   - Patient/family teaching   Outcome: Progressing      Problem: Nutrition/Hydration-ADULT  Goal: Nutrient/Hydration intake appropriate for improving, restoring or maintaining nutritional needs  Monitor and assess patient's nutrition/hydration status for malnutrition (ex- brittle hair, bruises, dry skin, pale skin and conjunctiva, muscle wasting, smooth red tongue, and disorientation)  Collaborate with interdisciplinary team and initiate plan and interventions as ordered  Monitor patient's weight and dietary intake as ordered or per policy  Utilize nutrition screening tool and intervene per policy   Determine patient's food preferences and provide high-protein, high-caloric foods as appropriate       INTERVENTIONS:  - Monitor oral intake, urinary output, labs, and treatment plans  - Assess nutrition and hydration status and recommend course of action  - Evaluate amount of meals eaten  - Assist patient with eating if necessary   - Allow adequate time for meals  - Recommend/ encourage appropriate diets, oral nutritional supplements, and vitamin/mineral supplements  - Order, calculate, and assess calorie counts as needed  - Recommend, monitor, and adjust tube feedings and TPN/PPN based on assessed needs  - Assess need for intravenous fluids  - Provide specific nutrition/hydration education as appropriate  - Include patient/family/caregiver in decisions related to nutrition   Outcome: Progressing      Problem: INFECTION - ADULT  Goal: Absence or prevention of progression during hospitalization  INTERVENTIONS:  - Assess and monitor for signs and symptoms of infection  - Monitor lab/diagnostic results  - Monitor all insertion sites, i e  indwelling lines, tubes, and drains  - Monitor endotracheal (as able) and nasal secretions for changes in amount and color  - [place] appropriate cooling/warming therapies per order  - Administer medications as ordered  - Instruct and encourage patient and family to use good hand hygiene technique  - Identify and instruct in appropriate isolation precautions for identified infection/condition   Outcome: Progressing    Goal: Absence of fever/infection during neutropenic period  INTERVENTIONS:  - Monitor WBC  - Implement neutropenic guidelines   Outcome: Completed Date Met: 12/06/17

## 2017-12-07 RX ORDER — FUROSEMIDE 20 MG/1
20 TABLET ORAL DAILY
Status: DISCONTINUED | OUTPATIENT
Start: 2017-12-07 | End: 2017-12-08

## 2017-12-07 RX ORDER — CEFDINIR 300 MG/1
300 CAPSULE ORAL EVERY 12 HOURS SCHEDULED
Status: DISCONTINUED | OUTPATIENT
Start: 2017-12-07 | End: 2017-12-08 | Stop reason: HOSPADM

## 2017-12-07 RX ORDER — SENNOSIDES 8.6 MG
2 TABLET ORAL 2 TIMES DAILY
Status: DISCONTINUED | OUTPATIENT
Start: 2017-12-07 | End: 2017-12-08 | Stop reason: HOSPADM

## 2017-12-07 RX ADMIN — AZITHROMYCIN 500 MG: 250 TABLET, FILM COATED ORAL at 08:52

## 2017-12-07 RX ADMIN — CEFDINIR 300 MG: 300 CAPSULE ORAL at 22:07

## 2017-12-07 RX ADMIN — FUROSEMIDE 20 MG: 20 TABLET ORAL at 09:03

## 2017-12-07 RX ADMIN — ACETAMINOPHEN 650 MG: 325 TABLET ORAL at 06:34

## 2017-12-07 RX ADMIN — FLECAINIDE ACETATE 50 MG: 50 TABLET ORAL at 08:52

## 2017-12-07 RX ADMIN — SENNOSIDES 17.2 MG: 8.6 TABLET, FILM COATED ORAL at 18:15

## 2017-12-07 RX ADMIN — DONEPEZIL HYDROCHLORIDE 5 MG: 5 TABLET, FILM COATED ORAL at 22:07

## 2017-12-07 RX ADMIN — FLUTICASONE PROPIONATE 2 SPRAY: 50 SPRAY, METERED NASAL at 08:59

## 2017-12-07 RX ADMIN — TOLTERODINE TARTRATE 2 MG: 2 CAPSULE, EXTENDED RELEASE ORAL at 08:52

## 2017-12-07 RX ADMIN — TAMSULOSIN HYDROCHLORIDE 0.4 MG: 0.4 CAPSULE ORAL at 16:00

## 2017-12-07 RX ADMIN — PANTOPRAZOLE SODIUM 40 MG: 40 TABLET, DELAYED RELEASE ORAL at 18:15

## 2017-12-07 RX ADMIN — PANTOPRAZOLE SODIUM 40 MG: 40 TABLET, DELAYED RELEASE ORAL at 08:52

## 2017-12-07 RX ADMIN — FINASTERIDE 5 MG: 5 TABLET, FILM COATED ORAL at 22:07

## 2017-12-07 RX ADMIN — TRAZODONE HYDROCHLORIDE 50 MG: 50 TABLET ORAL at 22:07

## 2017-12-07 RX ADMIN — WARFARIN SODIUM 5 MG: 5 TABLET ORAL at 18:20

## 2017-12-07 RX ADMIN — GABAPENTIN 100 MG: 100 CAPSULE ORAL at 08:52

## 2017-12-07 RX ADMIN — CEFDINIR 300 MG: 300 CAPSULE ORAL at 08:55

## 2017-12-07 NOTE — PLAN OF CARE

## 2017-12-07 NOTE — PLAN OF CARE
Problem: Potential for Falls  Goal: Patient will remain free of falls  INTERVENTIONS:  - Assess patient frequently for physical needs  -  Identify cognitive and physical deficits and behaviors that affect risk of falls  -  [place] fall precautions as indicated by assessment   - Educate patient/family on patient safety including physical limitations  - Instruct patient to call for assistance with activity based on assessment  - Modify environment to reduce risk of injury  - Consider OT/PT consult to assist with strengthening/mobility   Outcome: Progressing      Problem: Prexisting or High Potential for Compromised Skin Integrity  Goal: Skin integrity is maintained or improved  INTERVENTIONS:  - Identify patients at risk for skin breakdown  - Assess and monitor skin integrity  - Assess and monitor nutrition and hydration status  - Monitor labs (i e  albumin)  - Assess for incontinence   - Turn and reposition patient  - Assist with mobility/ambulation  - Relieve pressure over bony prominences  - Avoid friction and shearing  - Provide appropriate hygiene as needed including keeping skin clean and dry  - Evaluate need for skin moisturizer/barrier cream  - Collaborate with interdisciplinary team (i e  Nutrition, Rehabilitation, etc )   - Patient/family teaching   Outcome: Progressing      Problem: Nutrition/Hydration-ADULT  Goal: Nutrient/Hydration intake appropriate for improving, restoring or maintaining nutritional needs  Monitor and assess patient's nutrition/hydration status for malnutrition (ex- brittle hair, bruises, dry skin, pale skin and conjunctiva, muscle wasting, smooth red tongue, and disorientation)  Collaborate with interdisciplinary team and initiate plan and interventions as ordered  Monitor patient's weight and dietary intake as ordered or per policy  Utilize nutrition screening tool and intervene per policy   Determine patient's food preferences and provide high-protein, high-caloric foods as appropriate       INTERVENTIONS:  - Monitor oral intake, urinary output, labs, and treatment plans  - Assess nutrition and hydration status and recommend course of action  - Evaluate amount of meals eaten  - Assist patient with eating if necessary   - Allow adequate time for meals  - Recommend/ encourage appropriate diets, oral nutritional supplements, and vitamin/mineral supplements  - Order, calculate, and assess calorie counts as needed  - Recommend, monitor, and adjust tube feedings and TPN/PPN based on assessed needs  - Assess need for intravenous fluids  - Provide specific nutrition/hydration education as appropriate  - Include patient/family/caregiver in decisions related to nutrition   Outcome: Progressing      Problem: INFECTION - ADULT  Goal: Absence or prevention of progression during hospitalization  INTERVENTIONS:  - Assess and monitor for signs and symptoms of infection  - Monitor lab/diagnostic results  - Monitor all insertion sites, i e  indwelling lines, tubes, and drains  - Monitor endotracheal (as able) and nasal secretions for changes in amount and color  - [place] appropriate cooling/warming therapies per order  - Administer medications as ordered  - Instruct and encourage patient and family to use good hand hygiene technique  - Identify and instruct in appropriate isolation precautions for identified infection/condition   Outcome: Progressing      Problem: DISCHARGE PLANNING - CARE MANAGEMENT  Goal: Discharge to post-acute care or home with appropriate resources  INTERVENTIONS:  - Conduct assessment to determine patient/family and health care team treatment goals, and need for post-acute services based on payer coverage, community resources, and patient preferences, and barriers to discharge  - Address psychosocial, clinical, and financial barriers to discharge as identified in assessment in conjunction with the patient/family and health care team  - Arrange appropriate level of post-acute services according to patients   needs and preference and payer coverage in collaboration with the physician and health care team  - Communicate with and update the patient/family, physician, and health care team regarding progress on the discharge plan  - Arrange appropriate transportation to post-acute venues   Outcome: Progressing

## 2017-12-07 NOTE — PROGRESS NOTES
Progress Note Inessa Healy 80 y o  male MRN: [id number]    Unit/Bed#: -01 Encounter: [id number]        * CAP (community acquired pneumonia)   Assessment & Plan    · Community-acquired pneumonia  · Present on admission  · Change antibiotics to p o  Chronic atrial fibrillation (HCC)   Assessment & Plan    · Currently rate controled  · Monitor INR  · Patient on finasteride        Ambulatory dysfunction   Assessment & Plan    · PT evaluation pending  · I doubt this patient will be able to be discharged home  · I reviewed the possibility of skilled nursing facility patient he is agreeable        Metabolic encephalopathy   Assessment & Plan    · Present on admission  · Clinically at baseline per discussion with son yesterday afternoon           Pharmacologic: Warfarin (Coumadin)  Mechanical VTE Prophylaxis in Place: Yes    Patient Centered Rounds: I have performed bedside rounds with nursing staff today  Discussions with Specialists or Other Care Team Provider:     Education and Discussions with Family / Patient: case d/w daughter    Time Spent for Care: 30 minutes  More than 50% of total time spent on counseling and coordination of care as described above  Current Length of Stay: 3 day(s)    Current Patient Status: Inpatient   Certification Statement: The patient will continue to require additional inpatient hospital stay due to need for possible placement    Discharge Plan / Estimated Discharge Date: 24-48 hours      Code Status: Level 3 - DNAR and DNI      Subjective:   I am, ok    Objective:     Vitals:   Temp (24hrs), Av 9 °F (36 6 °C), Min:97 6 °F (36 4 °C), Max:98 4 °F (36 9 °C)    HR:  [70-80] 73  Resp:  [16-20] 18  BP: (114-126)/(57-59) 126/59  SpO2:  [92 %-95 %] 92 %  Body mass index is 28 83 kg/m²  Input and Output Summary (last 24 hours):        Intake/Output Summary (Last 24 hours) at 17 0828  Last data filed at 17 1344   Gross per 24 hour   Intake 300 ml   Output              200 ml   Net              100 ml       Physical Exam:     Physical Exam   Constitutional: No distress  Cardiovascular: Normal rate  Exam reveals no gallop and no friction rub  No murmur heard  Pulmonary/Chest: Effort normal  No respiratory distress  He has no wheezes  He has no rales  Abdominal: Soft  He exhibits no distension  There is no tenderness  There is no rebound  Neurological: He is alert  Skin: He is not diaphoretic  Additional Data:     Labs:      Results from last 7 days  Lab Units 12/05/17  0449 12/04/17  1623   WBC Thousand/uL 6 87 11 36*   HEMOGLOBIN g/dL 9 8* 11 3*   HEMATOCRIT % 31 0* 34 9*   PLATELETS Thousands/uL 167 191   NEUTROS PCT %  --  80*   LYMPHS PCT %  --  13*   MONOS PCT %  --  7   EOS PCT %  --  0       Results from last 7 days  Lab Units 12/06/17  0430  12/04/17  1623   SODIUM mmol/L 140  < > 138   POTASSIUM mmol/L 3 9  < > 3 8   CHLORIDE mmol/L 107  < > 103   CO2 mmol/L 25  < > 27   BUN mg/dL 19  < > 22   CREATININE mg/dL 1 13  < > 1 32*   CALCIUM mg/dL 8 2*  < > 8 8   TOTAL PROTEIN g/dL  --   --  6 2*   BILIRUBIN TOTAL mg/dL  --   --  0 40   ALK PHOS U/L  --   --  60   ALT U/L  --   --  17   AST U/L  --   --  12   GLUCOSE RANDOM mg/dL 96  < > 116   < > = values in this interval not displayed  Results from last 7 days  Lab Units 12/06/17  0430   INR  2 33*         Recent Cultures (last 7 days):       Results from last 7 days  Lab Units 12/05/17  0949 12/04/17  1754 12/04/17  1747 12/04/17  1620   BLOOD CULTURE   --  No Growth at 48 hrs  --  No Growth at 48 hrs     INFLUENZA A PCR   --   --  None Detected  --    INFLUENZA B PCR   --   --  None Detected  --    RSV PCR   --   --  None Detected  --    LEGIONELLA URINARY ANTIGEN  Negative  --   --   --        Last 24 Hours Medication List:     azithromycin 500 mg Oral Q24H   cefdinir 300 mg Oral Q12H FARAZ   cycloSPORINE 1 drop Both Eyes BID   donepezil 5 mg Oral HS   finasteride 5 mg Oral HS   flecainide 50 mg Oral QAM   fluticasone 2 spray Nasal Daily   gabapentin 100 mg Oral Daily   pantoprazole 40 mg Oral BID   tamsulosin 0 4 mg Oral Daily With Dinner   tolterodine 2 mg Oral Daily   traZODone 50 mg Oral HS   warfarin 2 5 mg Oral Once every 4 days   warfarin 5 mg Oral Once every 4 days   And      warfarin 5 mg Oral Once every 4 days   And      warfarin 5 mg Oral Once every 4 days        Today, Patient Was Seen By: Agustín Smart MD    ** Please Note: Dragon 360 Dictation voice to text software may have been used in the creation of this document   **

## 2017-12-07 NOTE — SOCIAL WORK
TC to pt's dtr Sohan Cintron at [phone] to completed assessment  LOS 3 days, pt is a bundle and explained to dtr about bundle and copy left in room  Pt is not a readmission  Pt lives at [facility] in the independent apartment  Pt has HHA that family pays extra for to assist pt with what ever needs he has like ADLs or wheeling him to dining room in w/c  Pt will amb with RW in the small apartment  Pt has a scooter which he no longer uses  Pt uses [facility] and [facility] in the mail  Pt does not has MH or D&A hx  Pt has POA/AD  Family will transport when discharge  Dtr's contact information is [phone]  Reminded pt about pt's medicare rights and a copy of the form was left in pt's room  Discuss discharge and the need for a short stay rehab and list of SNF left in pt's room  Dtr is agreeable to the following SNF referrals  in this order [facility] [place], [address]  Referrals made today  Will f/u

## 2017-12-07 NOTE — ASSESSMENT & PLAN NOTE
· PT evaluation pending  · I doubt this patient will be able to be discharged home    · I reviewed the possibility of skilled nursing facility patient he is agreeable

## 2017-12-07 NOTE — PLAN OF CARE
Problem: DISCHARGE PLANNING - CARE MANAGEMENT  Goal: Discharge to post-acute care or home with appropriate resources  INTERVENTIONS:  - Conduct assessment to determine patient/family and health care team treatment goals, and need for post-acute services based on payer coverage, community resources, and patient preferences, and barriers to discharge  - Address psychosocial, clinical, and financial barriers to discharge as identified in assessment in conjunction with the patient/family and health care team  - Arrange appropriate level of post-acute services according to patients   needs and preference and payer coverage in collaboration with the physician and health care team  - Communicate with and update the patient/family, physician, and health care team regarding progress on the discharge plan  - Arrange appropriate transportation to post-acute venues  Outcome: Progressing  TC to pt's dtr Amy Bright at [phone] to completed assessment  LOS 3 days, pt is a bundle and explained to dtr about bundle and copy left in room  Pt is not a readmission  Pt lives at [facility] in the independent apartment  Pt has HHA that family pays extra for to assist pt with what ever needs he has like ADLs or wheeling him to dining room in w/c  Pt will amb with RW in the small apartment  Pt has a scooter which he no longer uses  Pt uses [facility] and [facility] in the mail  Pt does not has MH or D&A hx  Pt has POA/AD  Family will transport when discharge  Dtr's contact information is [phone]  Reminded pt about pt's medicare rights and a copy of the form was left in pt's room  Discuss discharge and the need for a short stay rehab and list of SNF left in pt's room  Dtr is agreeable to the following SNF referrals  in this order [facility], [address] [place] [address]  Referrals made today  Will f/u

## 2017-12-08 VITALS
HEART RATE: 70 BPM | RESPIRATION RATE: 18 BRPM | SYSTOLIC BLOOD PRESSURE: 127 MMHG | BODY MASS INDEX: 28.73 KG/M2 | DIASTOLIC BLOOD PRESSURE: 60 MMHG | OXYGEN SATURATION: 92 % | TEMPERATURE: 98.5 F | HEIGHT: 68 IN | WEIGHT: 189.6 LBS

## 2017-12-08 PROBLEM — G93.41 METABOLIC ENCEPHALOPATHY: Status: RESOLVED | Noted: 2017-12-06 | Resolved: 2017-12-08

## 2017-12-08 LAB
ANION GAP SERPL CALCULATED.3IONS-SCNC: 8 MMOL/L (ref 4–13)
BUN SERPL-MCNC: 22 MG/DL (ref 5–25)
CALCIUM SERPL-MCNC: 8.4 MG/DL (ref 8.3–10.1)
CHLORIDE SERPL-SCNC: 106 MMOL/L (ref 100–108)
CO2 SERPL-SCNC: 26 MMOL/L (ref 21–32)
CREAT SERPL-MCNC: 1.14 MG/DL (ref 0.6–1.3)
GFR SERPL CREATININE-BSD FRML MDRD: 54 ML/MIN/1.73SQ M
GLUCOSE SERPL-MCNC: 99 MG/DL (ref 65–140)
POTASSIUM SERPL-SCNC: 4.1 MMOL/L (ref 3.5–5.3)
SODIUM SERPL-SCNC: 140 MMOL/L (ref 136–145)

## 2017-12-08 PROCEDURE — 80048 BASIC METABOLIC PNL TOTAL CA: CPT | Performed by: INTERNAL MEDICINE

## 2017-12-08 RX ORDER — WARFARIN SODIUM 5 MG/1
TABLET ORAL
Refills: 0
Start: 2017-12-11 | End: 2018-02-15 | Stop reason: HOSPADM

## 2017-12-08 RX ORDER — CEFDINIR 300 MG/1
300 CAPSULE ORAL EVERY 12 HOURS SCHEDULED
Qty: 6 CAPSULE | Refills: 0
Start: 2017-12-08 | End: 2017-12-13

## 2017-12-08 RX ORDER — AZITHROMYCIN 500 MG/1
500 TABLET, FILM COATED ORAL EVERY 24 HOURS
Qty: 10 TABLET | Refills: 0
Start: 2017-12-08 | End: 2017-12-13

## 2017-12-08 RX ORDER — SENNOSIDES 8.6 MG
2 TABLET ORAL 2 TIMES DAILY
Qty: 120 EACH | Refills: 0
Start: 2017-12-08

## 2017-12-08 RX ORDER — FUROSEMIDE 40 MG/1
40 TABLET ORAL DAILY
Status: DISCONTINUED | OUTPATIENT
Start: 2017-12-08 | End: 2017-12-08 | Stop reason: HOSPADM

## 2017-12-08 RX ADMIN — TOLTERODINE TARTRATE 2 MG: 2 CAPSULE, EXTENDED RELEASE ORAL at 08:38

## 2017-12-08 RX ADMIN — FLECAINIDE ACETATE 50 MG: 50 TABLET ORAL at 08:39

## 2017-12-08 RX ADMIN — GABAPENTIN 100 MG: 100 CAPSULE ORAL at 08:39

## 2017-12-08 RX ADMIN — PANTOPRAZOLE SODIUM 40 MG: 40 TABLET, DELAYED RELEASE ORAL at 08:39

## 2017-12-08 RX ADMIN — CEFDINIR 300 MG: 300 CAPSULE ORAL at 08:39

## 2017-12-08 RX ADMIN — AZITHROMYCIN 500 MG: 250 TABLET, FILM COATED ORAL at 08:39

## 2017-12-08 RX ADMIN — FUROSEMIDE 40 MG: 40 TABLET ORAL at 08:39

## 2017-12-08 NOTE — PLAN OF CARE
Problem: DISCHARGE PLANNING - CARE MANAGEMENT  Goal: Discharge to post-acute care or home with appropriate resources  INTERVENTIONS:  - Conduct assessment to determine patient/family and health care team treatment goals, and need for post-acute services based on payer coverage, community resources, and patient preferences, and barriers to discharge  - Address psychosocial, clinical, and financial barriers to discharge as identified in assessment in conjunction with the patient/family and health care team  - Arrange appropriate level of post-acute services according to patients   needs and preference and payer coverage in collaboration with the physician and health care team  - Communicate with and update the patient/family, physician, and health care team regarding progress on the discharge plan  - Arrange appropriate transportation to post-acute venues   Outcome: Adequate for Discharge  [facility] family's [facility] choice and they have accepted pt  Son Sabina Aschoff at [phone] is agreeable to this  Son is agreeable to [facility] and understand there will be a cost for this and pt will get billed  [facility] will  pt at 3 pm, son, RN and SNF aware of  time  Bundle label put in binder

## 2017-12-08 NOTE — PROGRESS NOTES
Progress Note Jair Pool 80 y o  male MRN: [id number]    Unit/Bed#: -01 Encounter: [id number]        * CAP (community acquired pneumonia)   Assessment & Plan    · Community-acquired pneumonia  · Present on admission  · Tolerating oral antibiotics possible discharge to SNF today        Chronic atrial fibrillation ([address] )   Assessment & Plan    · Currently rate controled  · Monitor INR  · Patient on finasteride        Ambulatory dysfunction   Assessment & Plan    · Awaiting placement        Metabolic encephalopathyresolved as of 2017   Assessment & Plan    · Present on admission  · Clinically at baseline per discussion with son yesterday afternoon           VTE Pharmacologic Prophylaxis:   Pharmacologic: Warfarin (Coumadin)  Mechanical VTE Prophylaxis in Place: Yes    Patient Centered Rounds: I have performed bedside rounds with nursing staff today  Discussions with Specialists or Other Care Team Provider:     Education and Discussions with Family / Patient:  Case reviewed with daughter at length    Time Spent for Care: 20 minutes  More than 50% of total time spent on counseling and coordination of care as described above  Current Length of Stay: 4 day(s)    Current Patient Status: Inpatient   Certification Statement: The patient will continue to require additional inpatient hospital stay due to Awaiting placement    Discharge Plan:  Possible discharge to skilled nursing facility    Code Status: Level 3 - DNAR and DNI      Subjective:   Patient comfortable no acute distress    Objective:     Vitals:   Temp (24hrs), Av 4 °F (36 9 °C), Min:98 3 °F (36 8 °C), Max:98 5 °F (36 9 °C)    HR:  [70-86] 86  Resp:  [18] 18  BP: ()/(46-58) 98/46  SpO2:  [92 %-96 %] 92 %  Body mass index is 28 83 kg/m²  Input and Output Summary (last 24 hours):        Intake/Output Summary (Last 24 hours) at 17 0824  Last data filed at 17 2300   Gross per 24 hour   Intake                0 ml Output              195 ml   Net             -195 ml       Physical Exam:     Physical Exam   Constitutional: No distress  Cardiovascular: Normal rate  Exam reveals no gallop and no friction rub  No murmur heard  Pulmonary/Chest: Effort normal  No respiratory distress  He has no wheezes  He has no rales  Abdominal: Soft  He exhibits no distension  There is no tenderness  There is no rebound and no guarding  Musculoskeletal: He exhibits edema  Skin: He is not diaphoretic  Additional Data:     Labs:      Results from last 7 days  Lab Units 12/05/17  0449 12/04/17  1623   WBC Thousand/uL 6 87 11 36*   HEMOGLOBIN g/dL 9 8* 11 3*   HEMATOCRIT % 31 0* 34 9*   PLATELETS Thousands/uL 167 191   NEUTROS PCT %  --  80*   LYMPHS PCT %  --  13*   MONOS PCT %  --  7   EOS PCT %  --  0       Results from last 7 days  Lab Units 12/08/17  0438  12/04/17  1623   SODIUM mmol/L 140  < > 138   POTASSIUM mmol/L 4 1  < > 3 8   CHLORIDE mmol/L 106  < > 103   CO2 mmol/L 26  < > 27   BUN mg/dL 22  < > 22   CREATININE mg/dL 1 14  < > 1 32*   CALCIUM mg/dL 8 4  < > 8 8   TOTAL PROTEIN g/dL  --   --  6 2*   BILIRUBIN TOTAL mg/dL  --   --  0 40   ALK PHOS U/L  --   --  60   ALT U/L  --   --  17   AST U/L  --   --  12   GLUCOSE RANDOM mg/dL 99  < > 116   < > = values in this interval not displayed  Results from last 7 days  Lab Units 12/06/17  0430   INR  2 33*       * I Have Reviewed All Lab Data Listed Above  * Additional Pertinent Lab Tests Reviewed: All Labs Within Last 24 Hours Reviewed    Imaging:    Imaging Reports Reviewed Today Include:   Imaging Personally Reviewed by Myself Includes:      Recent Cultures (last 7 days):       Results from last 7 days  Lab Units 12/05/17  0949 12/04/17  1754 12/04/17  1747 12/04/17  1620   BLOOD CULTURE   --  No Growth at 72 hrs   --  No Growth at 72 hrs     INFLUENZA A PCR   --   --  None Detected  --    INFLUENZA B PCR   --   --  None Detected  --    RSV PCR   --   --  None Detected  --    LEGIONELLA URINARY ANTIGEN  Negative  --   --   --        Last 24 Hours Medication List:     azithromycin 500 mg Oral Q24H   cefdinir 300 mg Oral Q12H [address]   cycloSPORINE 1 drop Both Eyes BID   donepezil 5 mg Oral HS   finasteride 5 mg Oral HS   flecainide 50 mg Oral QAM   fluticasone 2 spray Nasal Daily   furosemide 40 mg Oral Daily   gabapentin 100 mg Oral Daily   pantoprazole 40 mg Oral BID   senna 2 tablet Oral BID   tamsulosin 0 4 mg Oral Daily With Dinner   tolterodine 2 mg Oral Daily   traZODone 50 mg Oral HS   warfarin 2 5 mg Oral Once every 4 days   warfarin 5 mg Oral Once every 4 days   And      warfarin 5 mg Oral Once every 4 days   And      warfarin 5 mg Oral Once every 4 days        Today, Patient Was Seen By: Mariia Davis MD    ** Please Note: Dictation voice to text software may have been used in the creation of this document   **

## 2017-12-08 NOTE — SOCIAL WORK
[facility] family's [facility] choice and they have accepted pt  Son Chanda Mike at [phone] is agreeable to this  Son is agreeable to w/c [facility] and understand there will be a cost for this and pt will get billed  [facility] will  pt at 3 pm, son, RN and SNF aware of  time  Bundle label put in binder

## 2017-12-08 NOTE — ASSESSMENT & PLAN NOTE
· Community-acquired pneumonia  · Present on admission    · Tolerating oral antibiotics possible discharge to SNF today

## 2017-12-08 NOTE — DISCHARGE SUMMARY
Transition of Care Discharge Summary - [facility] Internal Medicine    Patient Information: Elma Noonan 80 y o  male MRN: [id number]  Unit/Bed#: -01 Encounter: [id number]    Discharging Physician / Practitioner: Radha Dixon MD  PCP: Zahraa Del Rio DO  Admission Date: 12/4/2017  Discharge Date: 12/08/17    Disposition:      Short Term Rehab or SNF at a [facility] SNF (see below)    For Discharges to [facility] SNF:   · Erika Blake MD - Unable to speak with physician, but message left  Reason for Admission:  Weakness and change in mental status    Discharge Diagnoses:     Principal Problem:    CAP (community acquired pneumonia)  Active Problems:    Chronic atrial fibrillation ([address] )    Ambulatory dysfunction  Resolved Problems:    Metabolic encephalopathy      Consultations During Hospital Stay:  · None    Procedures Performed:     · Chest x-ray right upper lobe and left lower lobe consolidation    Medication Adjustments and Discharge Medications:  · Summary of Medication Adjustments made as a result of this hospitalization:  No significant changes made to patient's chronic home medications  · Medications being temporarily held (include recommended restart time):  None  · Discharge Medication List: See after visit summary for reconciled discharge medications  Wound Care Recommendations:  When applicable, please see wound care section of After Visit Summary  Diet Recommendations at Discharge:  Diet -        Diet Orders            Start     Ordered    12/07/17 1337  Dietary nutrition supplements  Once     Question Answer Comment   Select Supplement: Ensure Compact-Vanilla    Select Supplement: Ensure Compact-Chocolate    Frequency Breakfast, Dinner        12/07/17 1336    12/04/17 2102  Room Service  Once     Question:  Type of Service  Answer:  Room Service - Appropriate with Assistance    12/04/17 2101    12/04/17 2008  Diet Cardiac; Sodium 2 GM;  Sodium 2 Gm, Lo Fat  Diet effective now     Question Answer Comment   Diet Type Cardiac    Cardiac Sodium 2 GM    Other Restriction(s): Sodium 2 Gm    Other Restriction(s): Lo Fat    RD to adjust diet per protocol? Yes        12/04/17 2007            Significant Findings / Test Results:     · None    Incidental Findings:   · None     Test Results Pending at Discharge (will require follow up): · None     Outpatient Tests Requested:  · None    Complications:  None    Hospital Course:     Crystal Chanel is a 80 y o  male patient who originally presented to the hospital on 12/4/2017 due to weakness confusion cough  Patient diagnosed with pneumonia  Patient currently on a course of therapy responding to therapy  His encephalopathy from pneumonia has improved  He at baseline did not ambulate well needed assistance his weakness has increased patient being discharged to [facility] for further care  His long-term prognosis that if as stated multiple medical comorbidities is poor    Condition at Discharge: poor     Discharge Day Visit / Exam:     Subjective:  No pain or shortness of breath  Vitals: Blood Pressure: 127/60 (12/08/17 0822)  Pulse: 70 (12/08/17 0822)  Temperature: 98 5 °F (36 9 °C) (12/08/17 0611)  Temp Source: Oral (12/08/17 2804)  Respirations: 18 (12/08/17 0822)  Height: 5' 8" (172 7 cm) (12/05/17 1511)  Weight - Scale: 86 kg (189 lb 9 5 oz) (12/04/17 1859)  SpO2: 92 % (12/08/17 0300)  Exam:   Physical Exam   Cardiovascular: Normal rate  Exam reveals no gallop and no friction rub  No murmur heard  Pulmonary/Chest: Effort normal  No respiratory distress  He has no wheezes  He has no rales  Abdominal: Soft  He exhibits no distension  There is no tenderness  There is no rebound  Neurological: He is alert         Discussion with Family:  Case reviewed with daughter prior to discharge     Goals of Care Discussions:  · Code Status at Discharge: Level 3 - DNAR and DNI  · Were there any Goals of Care Discussions during Hospitalization?: Yes  · Results of any General Goals of Care Discussions:  Patient's daughter realizes if poor response to PT next step for this patient would be hospice   · POLST Completed: No   · OK to Rehospitalize if Needed? Yes    Discharge instructions/Information to patient and family:   See after visit summary section titled Discharge Instructions for information provided to patient and family  Planned Readmission: no      Discharge Statement:  I spent 30 minutes discharging the patient  This time was spent on the day of discharge  I had direct contact with the patient on the day of discharge  Greater than 50% of the total time was spent examining patient, answering all patient questions, arranging and discussing plan of care with patient as well as directly providing post-discharge instructions  Additional time then spent on discharge activities      ** Please Note: This note has been constructed using a voice recognition system **

## 2017-12-08 NOTE — PLAN OF CARE
DISCHARGE PLANNING - CARE MANAGEMENT     Discharge to post-acute care or home with appropriate resources Progressing        INFECTION - ADULT     Absence or prevention of progression during hospitalization Progressing        Nutrition/Hydration-ADULT     Nutrient/Hydration intake appropriate for improving, restoring or maintaining nutritional needs Progressing        Potential for Falls     Patient will remain free of falls Progressing        Prexisting or High Potential for Compromised Skin Integrity     Skin integrity is maintained or improved Progressing

## 2017-12-08 NOTE — PROGRESS NOTES
Report called to receiving facility  Call back number given for any questions  Son at bedside awaiting transport to arrive  Patient resting comfortably in bed  Was bathed by RN/PCA and changed into clothing for transfer  Will continue to monitor patient, call bell within reach

## 2017-12-09 LAB
BACTERIA BLD CULT: NORMAL
BACTERIA BLD CULT: NORMAL

## 2017-12-13 ENCOUNTER — GENERIC CONVERSION - ENCOUNTER (OUTPATIENT)
Dept: OTHER | Facility: OTHER | Age: 82
End: 2017-12-13

## 2017-12-29 ENCOUNTER — GENERIC CONVERSION - ENCOUNTER (OUTPATIENT)
Dept: INTERNAL MEDICINE CLINIC | Facility: CLINIC | Age: 82
End: 2017-12-29

## 2018-01-03 ENCOUNTER — APPOINTMENT (EMERGENCY)
Dept: RADIOLOGY | Facility: HOSPITAL | Age: 83
End: 2018-01-03
Payer: MEDICARE

## 2018-01-03 ENCOUNTER — HOSPITAL ENCOUNTER (EMERGENCY)
Facility: HOSPITAL | Age: 83
Discharge: NURSING, REHAB OR LONG-TERM CARE | End: 2018-01-03
Attending: EMERGENCY MEDICINE | Admitting: EMERGENCY MEDICINE
Payer: MEDICARE

## 2018-01-03 VITALS
WEIGHT: 179 LBS | TEMPERATURE: 97.5 F | HEART RATE: 72 BPM | OXYGEN SATURATION: 96 % | DIASTOLIC BLOOD PRESSURE: 60 MMHG | RESPIRATION RATE: 18 BRPM | BODY MASS INDEX: 27.22 KG/M2 | SYSTOLIC BLOOD PRESSURE: 127 MMHG

## 2018-01-03 DIAGNOSIS — S30.0XXA CONTUSION OF LOWER BACK, INITIAL ENCOUNTER: ICD-10-CM
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
## 2018-01-03 LAB
ANION GAP SERPL CALCULATED.3IONS-SCNC: 9 MMOL/L (ref 4–13)
ATRIAL RATE: 97 BPM
BASOPHILS # BLD AUTO: 0.01 THOUSANDS/ΜL (ref 0–0.1)
BASOPHILS NFR BLD AUTO: 0 % (ref 0–1)
BUN SERPL-MCNC: 29 MG/DL (ref 5–25)
CALCIUM SERPL-MCNC: 8.9 MG/DL (ref 8.3–10.1)
CHLORIDE SERPL-SCNC: 108 MMOL/L (ref 100–108)
CK MB SERPL-MCNC: 1.4 NG/ML (ref 0–5)
CK MB SERPL-MCNC: <1 % (ref 0–2.5)
CK SERPL-CCNC: 391 U/L (ref 39–308)
CO2 SERPL-SCNC: 25 MMOL/L (ref 21–32)
CREAT SERPL-MCNC: 1.18 MG/DL (ref 0.6–1.3)
EOSINOPHIL # BLD AUTO: 0.01 THOUSAND/ΜL (ref 0–0.61)
EOSINOPHIL NFR BLD AUTO: 0 % (ref 0–6)
ERYTHROCYTE [DISTWIDTH] IN BLOOD BY AUTOMATED COUNT: 14.9 % (ref 11.6–15.1)
GFR SERPL CREATININE-BSD FRML MDRD: 51 ML/MIN/1.73SQ M
GLUCOSE SERPL-MCNC: 125 MG/DL (ref 65–140)
HCT VFR BLD AUTO: 33.9 % (ref 36.5–49.3)
HGB BLD-MCNC: 10.5 G/DL (ref 12–17)
INR PPP: 1.71 (ref 0.86–1.16)
LYMPHOCYTES # BLD AUTO: 0.96 THOUSANDS/ΜL (ref 0.6–4.47)
LYMPHOCYTES NFR BLD AUTO: 10 % (ref 14–44)
MCH RBC QN AUTO: 27.1 PG (ref 26.8–34.3)
MCHC RBC AUTO-ENTMCNC: 31 G/DL (ref 31.4–37.4)
MCV RBC AUTO: 88 FL (ref 82–98)
MONOCYTES # BLD AUTO: 0.74 THOUSAND/ΜL (ref 0.17–1.22)
MONOCYTES NFR BLD AUTO: 7 % (ref 4–12)
NEUTROPHILS # BLD AUTO: 8.33 THOUSANDS/ΜL (ref 1.85–7.62)
NEUTS SEG NFR BLD AUTO: 83 % (ref 43–75)
PLATELET # BLD AUTO: 167 THOUSANDS/UL (ref 149–390)
PMV BLD AUTO: 10.1 FL (ref 8.9–12.7)
POTASSIUM SERPL-SCNC: 4 MMOL/L (ref 3.5–5.3)
PROTHROMBIN TIME: 20.7 SECONDS (ref 12.1–14.4)
QRS AXIS: -75 DEGREES
QRSD INTERVAL: 170 MS
QT INTERVAL: 436 MS
QTC INTERVAL: 512 MS
RBC # BLD AUTO: 3.87 MILLION/UL (ref 3.88–5.62)
SODIUM SERPL-SCNC: 142 MMOL/L (ref 136–145)
T WAVE AXIS: 82 DEGREES
VENTRICULAR RATE: 83 BPM
WBC # BLD AUTO: 10.05 THOUSAND/UL (ref 4.31–10.16)

## 2018-01-03 PROCEDURE — 73564 X-RAY EXAM KNEE 4 OR MORE: CPT

## 2018-01-03 PROCEDURE — 80048 BASIC METABOLIC PNL TOTAL CA: CPT | Performed by: EMERGENCY MEDICINE

## 2018-01-03 PROCEDURE — 72170 X-RAY EXAM OF PELVIS: CPT

## 2018-01-03 PROCEDURE — 85610 PROTHROMBIN TIME: CPT | Performed by: EMERGENCY MEDICINE

## 2018-01-03 PROCEDURE — 93005 ELECTROCARDIOGRAM TRACING: CPT

## 2018-01-03 PROCEDURE — 82553 CREATINE MB FRACTION: CPT | Performed by: EMERGENCY MEDICINE

## 2018-01-03 PROCEDURE — 85025 COMPLETE CBC W/AUTO DIFF WBC: CPT | Performed by: EMERGENCY MEDICINE

## 2018-01-03 PROCEDURE — 82550 ASSAY OF CK (CPK): CPT | Performed by: EMERGENCY MEDICINE

## 2018-01-03 PROCEDURE — 36415 COLL VENOUS BLD VENIPUNCTURE: CPT | Performed by: EMERGENCY MEDICINE

## 2018-01-03 PROCEDURE — 99284 EMERGENCY DEPT VISIT MOD MDM: CPT

## 2018-01-03 PROCEDURE — 72100 X-RAY EXAM L-S SPINE 2/3 VWS: CPT

## 2018-01-03 RX ORDER — ACETAMINOPHEN 325 MG/1
650 TABLET ORAL ONCE
Status: COMPLETED | OUTPATIENT
Start: 2018-01-03 | End: 2018-01-03

## 2018-01-03 RX ADMIN — ACETAMINOPHEN 650 MG: 325 TABLET, FILM COATED ORAL at 10:44

## 2018-01-03 NOTE — ED NOTES
Patient in the hallway, relaxing on stretcher  No needs no distress        Yudelka Roblero  01/03/18 7389

## 2018-01-03 NOTE — ED PROCEDURE NOTE
PROCEDURE  ECG 12 Lead Documentation  Date/Time: 1/3/2018 11:09 AM  Performed by: Priyanka Russell  Authorized by: Sherron DUMONT     Indications / Diagnosis:  Fall  ECG reviewed by me, the ED Provider: yes    Patient location:  ED and bedside  Previous ECG:     Previous ECG:  Compared to current    Comparison ECG info:  12/4/17    Similarity:  No change  Interpretation:     Interpretation: non-specific    Rate:     ECG rate:  83    ECG rate assessment: normal    Rhythm:     Rhythm: paced    Pacing:     Capture:  Complete    Type of pacing:  Ventricular  Ectopy:     Ectopy: none    QRS:     QRS axis:  Left    QRS intervals:   Wide  Conduction:     Conduction: abnormal      Abnormal conduction: non-specific intraventricular conduction delay    ST segments:     ST segments:  Normal  T waves:     T waves: flattening      Flattening:  I, aVL and V1  Q waves:     Q waves:  II, III, aVF, V1, V2, V3, V4, V5 and V6  Other findings:     Other findings: prolonged qTc interval and U wave    Comments:      appropriate disconcordance between qrs and st vectors

## 2018-01-03 NOTE — ED PROVIDER NOTES
History  Chief Complaint   Patient presents with   [facility] Fall     pt assisted himself to a fall, here with some back pain  denies any head injury     80 yr  Male with recent admit for pn- to rehab  And now back at assisted living- last night at 10 pm as per himself got up to go to put ointment on with walker- states has bad r knee and legs gave out he sat down - with no sign injury - and lied on floor until 8 am when he was found-- pt remembers entire event- denies any medical symptom that caused fall- c/o left low back pain- worsening of r knee pain -- no other comps- injuries- states breathing is improved since d/c--         History provided by:  Patient   used: No    Fall   Associated symptoms: back pain    Associated symptoms: no neck pain        Prior to Admission Medications   Prescriptions Last Dose Informant Patient Reported? Taking?   acetaminophen (TYLENOL) 325 mg tablet  Child No No   Sig: Take 2 tablets by mouth every 6 (six) hours as needed for fever  cycloSPORINE (RESTASIS) 0 05 % ophthalmic emulsion  Child Yes No   Sig: Administer 1 drop to both eyes 2 (two) times a day  famciclovir (FAMVIR) 125 MG tablet  Child Yes No   Sig: Take 125 mg by mouth every morning     finasteride (PROSCAR) 5 mg tablet  Child Yes No   Sig: Take 5 mg by mouth daily at bedtime     flecainide (TAMBOCOR) 50 mg tablet  Child Yes No   Sig: Take 50 mg by mouth every morning     furosemide (LASIX) 40 mg tablet  Child Yes No   Sig: Take 40 mg by mouth every morning     gabapentin (NEURONTIN) 100 mg capsule  Child Yes No   Sig: Take 100 mg by mouth daily   galantamine (RAZADYNE) 4 mg tablet  Child Yes No   Sig: Take 4 mg by mouth daily     pantoprazole (PROTONIX) 40 mg tablet  Child Yes No   Sig: Take 40 mg by mouth 2 (two) times a day     potassium chloride (MICRO-K) 10 MEQ CR capsule  Child Yes No   Sig: Take by mouth   senna (SENOKOT) 8 6 mg   No No   Sig: Take 2 tablets by mouth 2 (two) times a day   sodium chloride (OCEAN) 0 65 % nasal spray   No No   Si sprays into each nostril as needed for congestion for up to 30 days  solifenacin (VESICARE) 5 mg tablet  Child Yes No   Sig: Take 5 mg by mouth daily at bedtime     tamsulosin (FLOMAX) 0 4 mg  Child Yes No   Sig: Take 0 4 mg by mouth daily with dinner  traZODone (DESYREL) 50 mg tablet  Child Yes No   Sig: Take 50 mg by mouth daily at bedtime     warfarin (COUMADIN) 5 mg tablet   No No   Si mg for 3 days and then 2 5 mg every 4th day      Facility-Administered Medications: None       Past Medical History:   Diagnosis Date    Atrial fibrillation (HCC)     Coronary artery disease     DVT (deep venous thrombosis) (HCC)     Pacemaker     S/P IVC filter     [facility]        Past Surgical History:   Procedure Laterality Date    APPENDECTOMY      CHOLECYSTECTOMY         Family History   Problem Relation Age of Onset    Family history unknown: Yes     I have reviewed and agree with the history as documented  Social History   Substance Use Topics    Smoking status: Former Smoker     Types: Cigarettes     Quit date: 1950    Smokeless tobacco: Never Used    Alcohol use No        Review of Systems   Constitutional: Positive for fatigue  Negative for activity change, appetite change, chills, diaphoresis, fever and unexpected weight change  HENT: Negative  Eyes: Negative  Respiratory: Negative  Cardiovascular: Negative  Gastrointestinal: Negative  Endocrine: Negative  Genitourinary: Negative  Musculoskeletal: Positive for back pain  Negative for arthralgias, gait problem, joint swelling, myalgias, neck pain and neck stiffness  Skin: Negative  Allergic/Immunologic: Negative  Neurological: Negative  Hematological: Negative  Psychiatric/Behavioral: Negative          Physical Exam  ED Triage Vitals [18 0902]   Temperature Pulse Respirations Blood Pressure SpO2   97 5 °F (36 4 °C) -- 18 139/71 96 %      Temp Source Heart Rate Source Patient Position - Orthostatic VS BP Location FiO2 (%)   Oral Monitor Sitting Right arm --      Pain Score       5           Orthostatic Vital Signs  Vitals:    01/03/18 0902   BP: 139/71   Patient Position - Orthostatic VS: Sitting       Physical Exam   Constitutional: No distress  avss--  Pulse ox 96 % - intepretation is normal- no intervention    HENT:   Head: Normocephalic and atraumatic  Eyes: Conjunctivae and EOM are normal  Pupils are equal, round, and reactive to light  Right eye exhibits no discharge  Left eye exhibits no discharge  No scleral icterus  Mm pink   Neck: Normal range of motion  Neck supple  No JVD present  No tracheal deviation present  No thyromegaly present  No pmt c spine   Cardiovascular: Normal rate, regular rhythm, normal heart sounds and intact distal pulses  Exam reveals no gallop and no friction rub  No murmur heard  Pulmonary/Chest: Effort normal  No stridor  No respiratory distress  He has no wheezes  He has no rales  He exhibits no tenderness  Mild decreased bs-b-  Right upper chest pacemaker   Abdominal: Soft  Bowel sounds are normal  He exhibits no distension and no mass  There is no tenderness  There is no rebound and no guarding  No hernia  Musculoskeletal: He exhibits edema and tenderness  He exhibits no deformity  No pmt t/l/s spine/ pelvis-- 1 plus ble pretibial edema- no assym/ mild bialteral erythema- equal bilateral radial pulses-- left lateral paralumbar muscle tendenress-- r knee- decreased rom -- no defromity --    Lymphadenopathy:     He has no cervical adenopathy  Neurological: He is alert  No cranial nerve deficit  He exhibits normal muscle tone  Coordination normal    A and o x 2-    Skin: Capillary refill takes less than 2 seconds  No rash noted  He is not diaphoretic  No erythema  There is pallor  Psychiatric: He has a normal mood and affect   His behavior is normal    Nursing note and vitals reviewed  ED Medications  Medications   acetaminophen (TYLENOL) tablet 650 mg (not administered)       Diagnostic Studies  Results Reviewed     Procedure Component Value Units Date/Time    CBC and differential [[id number]]     Lab Status:  No result Specimen:  Blood     Basic metabolic panel [[id number]]     Lab Status:  No result Specimen:  Blood     CK [[id number]]     Lab Status:  No result Specimen:  Blood     Protime-INR [[id number]]     Lab Status:  No result Specimen:  Blood                  XR lumbar spine 2 or 3 views    (Results Pending)   XR pelvis ap only 1 or 2 views    (Results Pending)   XR knee 4+ views Right injury    (Results Pending)              Procedures  Procedures       Phone Contacts  ED Phone Contact    ED Course  ED Course as of Jan 03 1547   Wed Jan 03, 2018   1034 Pelvis xray  bilateral thr- no acute fx - osteopenia    - l/s spine- similar compared to previous from 6/4/15-- l 3 comp fx/ and anterior comps fx's unchanged    - r knee xray - djd- no acute fx     1304 -- ER MD NOTE- CASE D/W-  GRAND DAUGHTER- PT JUST RELEASED YESTERDAY  FROM REHAB TO [facility]DEP[facility]ENT [facility]IV[facility]-- PT WILL LIKELY NEED PERMANENT PLACEMENT- WILL [facility] CASE MANAGEMENT FIRST TO SEE IF CAN PLACE    1306 ER MD NOTE- WAS  GOING TO TRY TO AMBULATE PT- BUT  WILL NOT AS PT IS NOT SAFE IN CURRENT LIVING ENVIRONMENT- WILL NEED TO B E PERMANENTLY PLACED RECENTLY ADMITTED  FROM 12/4 TO 12/8/17 AT [facility]    1339 - CASE D/W-  CASE MANAGEMENT- WILL  WORK ON TRYING TO SEND PT BACK TO REHAB FOR EVENTUAL PERMANENT PKLACEMENT    1450 NOTE- CASE MANAGEMENT CAN SEND PT BACK TO REHAB  AT AROUND  5 PM- WHILE TRY TO ARRANGE TRANSPORT FOR ER                                 [facility]  CritCare Time    Disposition  Final diagnoses:   None     ED Disposition     None      Follow-up Information    None       Patient's Medications   Discharge Prescriptions    No medications on file     No discharge procedures on file      ED Provider  Electronically Signed by           Ajit Soni MD  01/03/18 1382

## 2018-01-03 NOTE — DISCHARGE INSTRUCTIONS
Diagnosis; fall / low back contusion     - activity as tolerated    - for pain- over the counter tylenol 500 mg every 4 hrs    - please return to  The er for any new/ worsening/concerning symptoms to you

## 2018-01-03 NOTE — SOCIAL WORK
[facility] was asked to assist with placement of pt from ED  At last admission's dc, pt went to [facility] for [facility]  Pt was dc from [facility] yesterday and fell  [facility] called [facility] and they were agreeable for pt to return with [facility] and possible long term placement  [facility] attempted to meet with pt in ED, who was sleeping and spoke with pt's granddaughter  She asked that [facility] call pt's daughter, Kamran Cameron, who is POA   called and spoke with Kamran Cameron who was agreeable to pt going to [facility] and would like him transported  [facility] BLS transport arranged for MANFRED Mcgraw and  notified  CMN and phone/fax for report given to RN

## 2018-01-09 NOTE — MISCELLANEOUS
Message   Recorded as Task   Date: 05/31/2017 08:54 AM, Created By: Jazmin Collins 17   Task Name: Care Coordination   Assigned To: [facility] [facility],Team   Regarding Patient: Luis Tohmas, Status: Active   Comment:    Jazmin García 17 - 31 May 2017 8:54 AM     TASK CREATED  CT of the lumbar spine dated May 30, 2017 shows progression of degenerative changes of the lumbar spine, scoliosis, and the decompressive laminectomy and fusion of the lumbar spine that is intact  Basically, the fusion is intact and he continues to have degenerative changes of the lumbar spine  If the patient is interested, I can proceed with a right L5 transforaminal epidural steroid injection  If he does not wish to proceed, I can continue him on medications  In the event he would like to proceed (for Stephani Dasilva):    Proc:  R L5 TFESI  CPT:  12599  Dx:  M96 1, M51 36, M48 06, M54 16, M54 5   Evi Anderson - 31 May 2017 10:06 AM     TASK EDITED  Attempted to call and notify adult child on emergency list to go over results  LMOM to CB  Looks like pt  is on coumadin  Will need to ankita and sent consent to hold c/ Dr Zac Romano if pt  wants epidural    Bre Andersony - 31 May 2017 10:06 AM     TASK IN PROGRESS   Ayaan Youngblood - 31 May 2017 1:55 PM     TASK EDITED  Daughter called back  Please call [phone]  Evi Anderson - 31 May 2017 2:57 PM     TASK EDITED  S/w daughter Meredith Larios and she was made aware of the findings  Her father is on coumadin and she stated that they have a hard time controlling his PT/INR  She would review the findings with her father and see if this is something that he would be willing to do  She wants to know if this injection would cover his pain in his leg, because she stated that the orthopedic physicians had both told him that it is from his implant causing the pain  She also stated that noone addressed the issue that his one leg is shorter than the other   I stated that would be something that you could inquire at the next office visit  In the meantime I did review the process of obtaining a consent for Anticoagulation  Dr Rosa M Anguiano to be faxed a consent to hold and we will await a response and she will s/w c/ her father in regards to the epidural    Evi Anderson - 31 May 2017 3:06 PM     TASK EDITED  After reviewing the records, looks like Dr Los Cadet orders  Faxed consent to hold with him  Davide Houston - 31 May 2017 3:36 PM     TASK REPLIED TO: Previously Assigned To [facility]  James  I am not sure what the patient would like done regarding the differing leg lengths  It is not something that I can help him with  If it is something that bothers him, he can speak to the orthopedic physicians  Evi Anderson - 31 May 2017 3:50 PM     TASK EDITED  Keep in task; Await CB from daughter in regards to the epidural and await consent from Evi Chaney - 31 May 2017 3:50 PM     TASK IN PROGRESS   Evi Anderson - 01 Jun 2017 11:28 AM     TASK EDITED  Received consent to hold coumadin from Dr Los Cadet  Await daughter to cb to schedule  Ayaan [place] - 07 Jun 2017 9:05 AM     TASK EDITED  attempted to call pt x2 on home/cell #, both times got voice mail stating it is Bonnee Harsh, did not leave a message  Brooke Saldana - 07 Jun 2017 9:23 AM     TASK EDITED  Delbert Argue**    S/w pt's daughter Birgit Bello, Vertified pt's home/cell # with her and she stated that it is correct # that it is her daughter's number that the pt can't hear  Birgit Bello stated her father is going to talk to his PCP about the procedure today  Advised her SPA received the consent to hold the coumadin  Birgit Bello stated pt is unsure yet abt the procedure  The pt needs ear surgery for cancer and doesn't want to go off the coumadin 2 times that if he gets the procedure it needs to be coordinated with the ear sx  Birgit Bello stated when the pt was at [facility] with her daughter at the [facility], "the doctor didn't even really examine him when he was there    He just wanted to get the MRI done "  Advised her of task from [facility] on 17 at 3:36pm   Julissaflorentino Campo stated she willl call back with the pt's decision  [facility] 11:43 AM     TASK REPLIED TO: Previously Assigned To [place]  Thanks  I did examine him as best I could without hurting him too significantly  He is 80years old and presented on a wheelchair  If I did my full exam, he would be in a lot of discomfort on leaving the office  Brooke Saldana - 2017 1:25 PM     TASK EDITED  **pt's daughter to C/TITUScornelius Redman - 2017 8:38 AM     TASK EDITED  [facility] for Julissa Loop to C/B, C/B # provided  Julia Jay - 2017 11:22 AM     TASK EDITED  Pt called wanting to set up an appt  for an injection  He would like to be called at [phone]  Nancy Stone - 2017 3:09 PM     TASK EDITED    ***Please confirm with pt that new hold was received from michael so pt can keep his  appt and will still take last dose of coumadin on ****    S/w pt regarding above  Pt ready to schedule opro for   Pt unable to come in sooner due to ride availability  Pt has a hold request that will  from Dr Ladonna Francois on   Called Dr Cruz Music office and s/w Jc Marroquin stated that she will get this information to Dr Ladonna Francois and get the renewal faxed to [phone] ([place] main fax # attodell Cruz)    Pt scheduled tentatively waiting hold request update, from Dr Ladonna Francois  Pt aware that his appt is 11:00 arrival time on  with [facility] in [place] [address]  Pt aware that he needs to have a pt/inr drawn the morning of the  no later than one hour prior to opro  Pt aware that script will be in allscripts and also sent to pts home upon pt's request along with all instructions  Pt aware that last day to take coumadin will be , hold from the  until opro on 7/7 until d/c instructions given to pt at d/c by RN on when to restart coumadin      Pt aware that he will need a , must wear comfortable clothing, (ie sweat pants), may eat up to one hour prior to opro and to reschedule appt if becomes ill, has a fever, starts on antibioitcs  Pt verbalized understanding of all information and did request all instructions given to him on the phone to be mailed to his home(address confirmed with pt) due to being 80 yrs old and not wanting to forget  Paola Fuller - 22 Jun 2017 3:54 PM     TASK EDITED  Task rec'd from Dr Tsering Vargas, ok to hold Coumadin as requested  Scanned into chart  Nancy Stone - 22 Jun 2017 3:58 PM     TASK EDITED  S/w pt regarding above, aware that 7/7 appt is ok with Dr Tsering Vargas, last day to take coumadin is 7/1  Pt appreciative of same  Active Problems    1  Acid indigestion (536 8) (K30)   2  Adjustment disorder with mixed anxiety and depressed mood (309 28) (F43 23)   3  Allergic rhinitis (477 9) (J30 9)   4  Atrial fibrillation (427 31) (I48 91)   5  Back pain (724 5) (M54 9)   6  Benign Localized Prostatic Hyperplasia Without Urinary Obstruction With Other Lower   Urinary Tract Symptoms (600 20)   7  CAD (coronary artery disease) (414 00) (I25 10)   8  Carpal tunnel syndrome (354 0) (G56 00)   9  Chronic low back pain (724 2,338 29) (M54 5,G89 29)   10  Chronic pain syndrome (338 4) (G89 4)   11  DDD (degenerative disc disease), lumbar (722 52) (M51 36)   12  Depression (311) (F32 9)   13  DJD (degenerative joint disease) of pelvis (715 95) (M16 10)   14  Dyspnea (786 09) (R06 00)   15  Dysuria (788 1) (R30 0)   16  Edema (782 3) (R60 9)   17  Esophageal reflux (530 81) (K21 9)   18  Fall (E888 9) (W19 XXXA)   19  Foreskin inflammation (607 2) (N48 29)   20  Foreskin problem (607 89) (N47 8)   21  Greater trochanteric bursitis, right (726 5) (M70 61)   22  Hypertension (401 9) (I10)   23  Insomnia (780 52) (G47 00)   24  Localized cancer of skin of ear, 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)   25  Lumbar canal stenosis (724 02) (M48 06)   26  Lumbar radiculopathy (724 4) (M54 16)   27  History of Need for prophylactic vaccination and inoculation against influenza (V04 81)    (Z23)   28  Nocturia (788 43) (R35 1)   29  Orthopedic Aftercare For Healing Traumatic Fx Lower Arm (V54 12)   30  Osteoarthritis of knee, unilateral (715 16) (M17 10)   31  Osteoarthrosis (715 90) (M19 90)   32  Other acute gastritis without hemorrhage (535 00) (K29 00)   33  Other chronic pain (338 29) (G89 29)   34  Pain in joint of right hip (719 45) (M25 551)   35  Pressure ulcer, buttock (707 05,707 20) (L89 309)   36  Prostatitis (601 9) (N41 9)   37  Right hip pain (719 45) (M25 551)   38  Sick sinus syndrome (427 81) (I49 5)   39  Skin tear (879 8)   40  Sleep disorder (780 50) (G47 9)   41  Symptoms involving urinary system (788 99) (R39 9)   42  Thyroid nodule (241 0) (E04 1)   43  Tinea cruris (110 3) (B35 6)   44  Urgency of urination (788 63) (R39 15)   45  URI (upper respiratory infection) (465 9) (J06 9)   46  Urinary frequency (788 41) (R35 0)   47  UTI (urinary tract infection) (599 0) (N39 0)   48  Viral infection (079 99) (B34 9)   49  Yeast infection of the skin (112 3) (B37 2)    Current Meds   1  Centrum Silver Oral Tablet Recorded   2  Ciprofloxacin HCl - 500 MG Oral Tablet; TABS PO;   Therapy: 60HUY0072-; Last Rx:93Rmd1913; Status: NEED INFORMATION - Problem   Ordered   3  [employer] Allergy Relief 180 MG Oral Tablet Recorded   4  [employer] Vitamin D CAPS Recorded   5  Famciclovir 125 MG Oral Tablet; Take 1 tablet daily; Therapy: 83WXF4784 to (Evaluate:45Kts7972)  Requested for: 43KAT6458; Last   Rx:19Jan2017 Ordered   6  Finasteride 5 MG Oral Tablet (Proscar); TAKE 1 TABLET AT BEDTIME; Therapy: 44LVF7165 to (Last Rx:19Jan2017)  Requested for: 20Jan2017 Ordered   7  Flecainide Acetate 50 MG Oral Tablet; Take 1 tablet daily; Therapy: 22JJC1999 to (Evaluate:12Oct2017)  Requested for: 38TUJ6128; Last   Rx:17Oct2016 Ordered   8   Fluticasone Propionate 50 MCG/ACT Nasal Suspension (Flonase); USE 1 SPRAY IN   [facility] NOSTRIL TWICE DAILY; Therapy: 97YBF5353 to (Last Rx:22Nov2013)  Requested for: 22Nov2013 Ordered   9  Galantamine Hydrobromide 4 MG Oral Tablet; Take 1 tablet daily; Therapy: 39CTX1450 to (XKSJADWQ:44AOB3390)  Requested for: 67RNG4684; Last   Rx:27Jan2017 Ordered   10  Hydrocodone-Acetaminophen 5-325 MG Oral Tablet; TAKE 1 TABLET EVERY 4 TO 6    HOURS AS NEEDED; Therapy: (Recorded:07Jun2017) to Recorded   11  Oseltamivir Phosphate 75 MG Oral Capsule (Tamiflu); TAKE 1 CAPSULE TWICE DAILY; Therapy: 13PVR2165 to (Evaluate:56Qqf2090)  Requested for: 13CNI7620; Last    Rx:30Jan2017 Ordered   12  Pantoprazole Sodium 40 MG Oral Tablet Delayed Release; Take 1 tablet twice daily; Therapy: 69PJS2207 to (PUHYKNBA:05VMC4809)  Requested for: 71YHM4114; Last    Rx:18Jan2017 Ordered   13  Restasis 0 05 % Ophthalmic Emulsion; Therapy: 04Rit2402 to (Douglas Lechuga) Recorded   14  Tamsulosin HCl - 0 4 MG Oral Capsule (Flomax); TAKE 1 CAPSULE AT BEDTIME; Therapy: 39GCH3569 to (Last Rx:19Jan2017)  Requested for: 20Jan2017 Ordered   15  VESIcare 5 MG Oral Tablet; One pill at bedtime; Therapy: 79YFC7703 to (Evaluate:14Jan2018)  Requested for: 24BQW6303; Last    Rx:19Jan2017 Ordered   16  Warfarin Sodium 2 MG Oral Tablet; Take 1 tablet daily as directed; Therapy: 42RRP4683 to (Evaluate:50Fsl3437)  Requested for: 64Iyd5758; Last    Rx:20Dec2012 Ordered   17  Warfarin Sodium 5 MG Oral Tablet; Take 1 tablet daily as directed; Therapy: 53BZZ6394 to (Humberto Ortiz)  Requested for: 77Fay8637 Ordered   18  Zolpidem Tartrate 5 MG Oral Tablet; TAKE ONE TABLET BY MOUTH AT BEDTIME AS    NEEDED FOR SLEEP; Therapy: 88TGN1534 to (Evaluate:75Upz5696); Last Rx:07Jun2017 Ordered    Allergies    1  CeleBREX CAPS   2  Sulfa Drugs    3  No Known Environmental Allergies   4   No Known Food Allergies    Signatures   Electronically signed by : Gabbie West RN; Jun 22 2017  4:00PM EST                       (Author)

## 2018-01-09 NOTE — MISCELLANEOUS
Message   Recorded as Task   Date: 07/14/2017 08:28 AM, Created By: Suki Anaya   Task Name: Follow Up   Assigned To: [address] ,Team   Regarding Patient: Shantell Mathur, Status: Active   Comment:    Carlota Naik - 14 Jul 2017 8:28 AM     TASK CREATED  L/m to return call with % of relief  s/p RT L5 TFESI/COUMADIN done 7/7/17 by Carlota Lopez - 14 Jul 2017 10:42 AM     TASK EDITED   David Balint - 20 Jul 3847 31:74 AM     TASK EDITED  2nd Attempt     Lm on Vm to [facility] - 27 Jul 3400 80:47 AM     TASK EDITED  Please send CNR letter  Thanks   Letter Sent      Active Problems    1  Acid indigestion (536 8) (K30)   2  Adjustment disorder with mixed anxiety and depressed mood (309 28) (F43 23)   3  Allergic rhinitis (477 9) (J30 9)   4  Atrial fibrillation (427 31) (I48 91)   5  Back pain (724 5) (M54 9)   6  Benign Localized Prostatic Hyperplasia Without Urinary Obstruction With Other Lower   Urinary Tract Symptoms (600 20)   7  CAD (coronary artery disease) (414 00) (I25 10)   8  Carpal tunnel syndrome (354 0) (G56 00)   9  Chronic low back pain (724 2,338 29) (M54 5,G89 29)   10  Chronic pain syndrome (338 4) (G89 4)   11  DDD (degenerative disc disease), lumbar (722 52) (M51 36)   12  Depression (311) (F32 9)   13  DJD (degenerative joint disease) of pelvis (715 95) (M16 10)   14  Dyspnea (786 09) (R06 00)   15  Dysuria (788 1) (R30 0)   16  Edema (782 3) (R60 9)   17  Esophageal reflux (530 81) (K21 9)   18  Fall (E888 9) (W19 XXXA)   19  Foreskin inflammation (607 2) (N48 29)   20  Foreskin problem (607 89) (N47 8)   21  Greater trochanteric bursitis, right (726 5) (M70 61)   22  Heart block (426 9) (I45 9)   23  Hypertension (401 9) (I10)   24  Insomnia (780 52) (G47 00)   25  Lumbar canal stenosis (724 02) (M48 06)   26  Lumbar radiculopathy (724 4) (M54 16)   27  History of Need for prophylactic vaccination and inoculation against influenza (V04 81)    (Z23)   28   Nocturia (788 43) (R35 1)   29  Orthopedic Aftercare For Healing Traumatic Fx Lower Arm (V54 12)   30  Osteoarthritis of knee, unilateral (715 16) (M17 10)   31  Osteoarthrosis (715 90) (M19 90)   32  Other acute gastritis without hemorrhage (535 00) (K29 00)   33  Other chronic pain (338 29) (G89 29)   34  Pain in joint of right hip (719 45) (M25 551)   35  Pressure ulcer, buttock (707 05,707 20) (L89 309)   36  Prostatitis (601 9) (N41 9)   37  Right hip pain (719 45) (M25 551)   38  Sick sinus syndrome (427 81) (I49 5)   39  Skin tear (879 8)   40  Sleep disorder (780 50) (G47 9)   41  Symptoms involving urinary system (788 99) (R39 9)   42  Thyroid nodule (241 0) (E04 1)   43  Tinea cruris (110 3) (B35 6)   44  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)    (C44 209)   45  Urgency of urination (788 63) (R39 15)   46  URI (upper respiratory infection) (465 9) (J06 9)   47  Urinary frequency (788 41) (R35 0)   48  UTI (urinary tract infection) (599 0) (N39 0)   49  Viral infection (079 99) (B34 9)   50  Yeast infection of the skin (112 3) (B37 2)    Current Meds   1  Centrum Silver Oral Tablet Recorded   2  Ciprofloxacin HCl - 500 MG Oral Tablet; TABS PO;   Therapy: 39MKR1176-; Last Rx:77Stn4454; Status: NEED INFORMATION - Problem   Ordered   3  CVS Allergy Relief 180 MG Oral Tablet Recorded   4  CVS Vitamin D CAPS Recorded   5  Famciclovir 125 MG Oral Tablet; Take 1 tablet daily; Therapy: 13OBE5472 to (Evaluate:15Bcy1288)  Requested for: 99ACO0551; Last   Rx:19Jan2017 Ordered   6  Finasteride 5 MG Oral Tablet (Proscar); TAKE 1 TABLET AT BEDTIME; Therapy: 29QJJ3676 to (Last Rx:19Jan2017)  Requested for: 20Jan2017 Ordered   7  Flecainide Acetate 50 MG Oral Tablet; Take 1 tablet daily; Therapy: 27JCT5656 to (Evaluate:12Oct2017)  Requested for: 12BWY3414; Last   Rx:17Oct2016 Ordered   8   Fluticasone Propionate 50 MCG/ACT Nasal Suspension (Flonase); USE 1 SPRAY IN   [facility] NOSTRIL TWICE DAILY; Therapy: 97HDU1037 to (Last Rx:22Nov2013)  Requested for: 22Nov2013 Ordered   9  Galantamine Hydrobromide 4 MG Oral Tablet; Take 1 tablet daily; Therapy: 88NKG3448 to ([facility]EM[facility]58)  Requested for: 84FVP2553; Last   Rx:27Jan2017 Ordered   10  Hydrocodone-Acetaminophen 5-325 MG Oral Tablet; TAKE 1 TABLET EVERY 4 TO 6    HOURS AS NEEDED; Therapy: (Recorded:07Jun2017) to Recorded   11  Oseltamivir Phosphate 75 MG Oral Capsule (Tamiflu); TAKE 1 CAPSULE TWICE DAILY; Therapy: 71RVC1926 to (Evaluate:53Ivc6704)  Requested for: 16FRX1587; Last    Rx:30Jan2017 Ordered   12  Pantoprazole Sodium 40 MG Oral Tablet Delayed Release; Take 1 tablet twice daily; Therapy: 36BBN6611 to (EDLPFT:80OZK3600)  Requested for: 71CBJ5315; Last    Rx:18Jan2017 Ordered   13  Restasis 0 05 % Ophthalmic Emulsion; Therapy: 97Wqx2763 to (Sarita Owusu) Recorded   14  Tamsulosin HCl - 0 4 MG Oral Capsule (Flomax); TAKE 1 CAPSULE AT BEDTIME; Therapy: 14WNF5166 to (Last Rx:19Jan2017)  Requested for: 20Jan2017 Ordered   15  VESIcare 5 MG Oral Tablet; One pill at bedtime; Therapy: 23KSS1369 to (Evaluate:14Jan2018)  Requested for: 08EKB0750; Last    Rx:19Jan2017 Ordered   16  Warfarin Sodium 2 MG Oral Tablet; Take 1 tablet daily as directed; Therapy: 79OAL8963 to (Evaluate:32Cxq9951)  Requested for: 73Hrh7064; Last    Rx:12Jub8877 Ordered   17  Warfarin Sodium 5 MG Oral Tablet; Take 1 tablet daily as directed; Therapy: 52XLV1574 to (Humberto [facility])  Requested for: 99Wki6935 Ordered   18  Zolpidem Tartrate 5 MG Oral Tablet; TAKE ONE TABLET BY MOUTH AT BEDTIME AS    NEEDED FOR SLEEP; Therapy: 28OGM1294 to (Evaluate:37Frf7597); Last Rx:07Jun2017 Ordered    Allergies    1  CeleBREX CAPS   2  Sulfa Drugs    3  No Known Environmental Allergies   4   No Known Food Allergies    Signatures   Electronically signed by : Ron Fish, ; Jul 31 2017  9:41AM EST                       (Author)

## 2018-01-11 NOTE — PROGRESS NOTES
Assessment    1  Edema (782 3) (R60 9)   2  Atrial fibrillation (427 31) (I48 91)   3  CAD (coronary artery disease) (414 00) (I25 10)   4  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 01) (N40 1)    Plan  BPH with obstruction/lower urinary tract symptoms    · Follow-up visit in 3 months Evaluation and Treatment  Follow-up  Status: Hold For -  Scheduling  Requested for: 47LOH5967    Discussion/Summary    #1  Bilateral lower extremity edema  Patient still has swelling to both feet and ankles which is worse on the left than the right but it seemed to improved from previous visit  As mentioned patient is monitored on a regular basis in the personal care facility where he resides  He does take his Lasix intermittently for increased swelling  #2  Chronic itch or fibrillation which is rate controlled  Patient has a dual-chamber pacemaker in place  Patient continues on anticoagulants  #3  Coronary artery disease  Patient has no evidence of acute problems and will continue with followup with his cardiologist     #4  BPH with obstructive symptoms  Patient states his urinary problems are stable but no dysuria this point in time  Patient does have a history of recurrent urinary tract infections and noticed call our office or his urologist if any new problems  #5  DJD with ambulatory dysfunction  I did asked the patient whether or not he would benefit from physical therapy  He states it helps for a little while but then he goes back to his previous status  Patient was told if he changes his mind and would like to restart some physical therapy to help with ambulation, transfer to please call the office and this will be arranged  The patient was counseled regarding instructions for management, risk factor reductions, prognosis, patient and family education, impressions, risks and benefits of treatment options, importance of compliance with treatment     Possible side effects of new medications were reviewed with the patient/guardian today  The treatment plan was reviewed with the patient/guardian  The patient/guardian understands and agrees with the treatment plan      Chief Complaint  Patient is here today c/o having bilateral pain and swelling in his legs  History of Present Illness  HPI: Patient is a 49-year-old male with a history of multiple medical problems as outlined previously  Patient's here again in the office for reevaluation specifically looking at control of the lower extremity edema which is chronic and had increased over the last month  In order to help control this problem patient was placed on episodic doses of Lasix in order to help with the swelling in the feet and ankles  Patient complains of no pain in the lower extremity and she's unsure whether the Lasix has helped  He is being chronically evaluated by the nurses who sees him in the nursing home and help him with his activities of daily living including bathing and dressing  Patient denies any chest pain or pressure and no increasing shortness of breath with exertion  He states his appetite is good and is having no new bowel or bladder problems  Hospital Based Practices Required Assessment:   Pain Assessment   the patient states they have pain  The pain is located in the Chronic diffuse arthritic aches and pains but he has no new complaints or problems  The patient describes the pain as aching  (on a scale of 0 to 10, the patient rates the pain at 2, at times ranging as high as 5 )   Abuse And Domestic Violence Screen    Yes, the patient is safe at home  The patient states no one is hurting them  Depression And Suicide Screen  No, the patient has not had thoughts of hurting themself  No, the patient has not felt depressed in the past 7 days  Primary Language is  English  Readiness To Learn: Receptive  Barriers To Learning: none     Preferred Learning: verbal   Education Completed: disease/condition, medications and further treatment/follow-up   Teaching Method: verbal   Person Taught: patient   Evaluation Of Learning: verbalized/demonstrated understanding      Review of Systems    Constitutional: recent Axis III pound weight gain since last seen patient states he's been an active and enjoying the holidays eating a little bit extra at every meal lb weight gain and feeling tired, but no fever, not feeling poorly and no chills  ENT: hearing loss, but no earache, no nosebleeds, no sore throat, no nasal discharge and no hoarseness  Cardiovascular: lower extremity edema, but the heart rate was not slow, no chest pain, no intermittent leg claudication, the heart rate was not fast and no palpitations  Respiratory: shortness of breath during exertion and Chronic shortness of breath with exertion but no increased from previously, but no shortness of breath, no cough, no orthopnea, no wheezing and no PND  Gastrointestinal: no complaints of abdominal pain, no constipation, no nausea or vomiting, no diarrhea or bloody stools  Genitourinary: urinary hesitancy, nocturia and inadequacy of penile erection, but no dysuria, no genital lesions and no incontinence  Musculoskeletal: arthralgias, myalgias, joint stiffness and limb swelling, but no joint swelling and no limb pain  Integumentary: dry skin, but no rashes, no itching, no skin lesions and no skin wound  Neurological: no complaints of headache, no confusion, no numbness or tingling, no dizziness or fainting  ROS reviewed  Active Problems    1  Adjustment disorder with mixed anxiety and depressed mood (309 28) (F43 23)   2  Allergic rhinitis (477 9) (J30 9)   3  Arthralgia of right shoulder region (719 41) (M25 511)   4  Arthralgia Of The Right Pelvis/Hip/Femur (719 45)   5  Arthropathy of lumbar facet joint (721 3) (M47 816)   6  Atrial fibrillation (427 31) (I48 91)   7  Back pain (724 5) (M54 9)   8   Benign Localized Prostatic Hyperplasia Without Urinary Obstruction With Other Lower   Urinary Tract Symptoms (600 20)   9  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 01) (N40 1)   10  CAD (coronary artery disease) (414 00) (I25 10)   11  Carpal tunnel syndrome (354 0) (G56 00)   12  Compression fracture (829 0) (T14 8)   13  DDD (degenerative disc disease), lumbar (722 52) (M51 36)   14  Depression (311) (F32 9)   15  DJD (degenerative joint disease) of pelvis (715 95) (M16 9)   16  Dyspnea (786 09) (R06 00)   17  Dysuria (788 1) (R30 0)   18  Edema (782 3) (R60 9)   19  Esophageal reflux (530 81) (K21 9)   20  Fall (E888 9) (W19 XXXA)   21  Fracture of radius, distal, closed (813 42) (S52 509A)   22  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)   23  Hypertension (401 9) (I10)   24  Insomnia (780 52) (G47 00)   25  Lumbar canal stenosis (724 02) (M48 06)   26  Need for immunization against influenza (V04 81) (Z23)   27  Need for prophylactic vaccination and inoculation against influenza (V04 81) (Z23)   28  Nocturia (788 43) (R35 1)   29  Orthopedic Aftercare For Healing Traumatic Fx Lower Arm (V54 12)   30  Osteoarthritis of knee, unilateral (715 16) (M17 9)   31  Osteoarthrosis (715 90) (M19 90)   32  Other chronic pain (338 29) (G89 29)   33  Pain in joint of right hip (719 45) (M25 551)   34  Pressure ulcer, buttock (707 05,707 20) (L89 309)   35  Prostatitis (601 9) (N41 9)   36  Sick sinus syndrome (427 81) (I49 5)   37  Skin tear (879 8)   38  Sleep disorder (780 50) (G47 9)   39  Symptoms involving urinary system (788 99) (R39 9)   40  Thyroid nodule (241 0) (E04 1)   41  Tinea cruris (110 3) (B35 6)   42  Urgency of urination (788 63) (R39 15)   43  URI (upper respiratory infection) (465 9) (J06 9)   44  Urinary frequency (788 41) (R35 0)   45   UTI (urinary tract infection) (599 0) (N39 0)    Social History    · Denied: History of Alcohol Use (History)   · Being A Social Drinker   · Denied: History of Current Smoker   · Denied: History of Drug Use   · Former smoker (V15 82) (Z87 891)   · Never A Smoker   ·     Current Meds   1  Centrum Silver Oral Tablet Recorded   2  Ciprofloxacin HCl - 500 MG Oral Tablet; TABS PO;   Therapy: 53XZJ3500-; Last Rx:79Tzy7294; Status: NEED INFORMATION - Problem   Ordered   3  [employer] Allergy Relief 180 MG Oral Tablet Recorded   4  [employer] Vitamin D CAPS Recorded   5  Famciclovir 125 MG Oral Tablet; Take 1 tablet daily; Therapy: 62MZW2369 to (Last Rx:05Oct2015)  Requested for: 05Oct2015 Ordered   6  Finasteride 5 MG Oral Tablet; TAKE 1 TABLET AT BEDTIME; Therapy: 29LDT0930 to (Last Rx:05Oct2015)  Requested for: 08MVR1359 Ordered   7  Flecainide Acetate 50 MG Oral Tablet; Take 1 tablet daily; Therapy: (Recorded:06Oct2015) to Recorded   8  Fluticasone Propionate 50 MCG/ACT Nasal Suspension; USE 1 SPRAY IN EACH   NOSTRIL TWICE DAILY; Therapy: 30TMH9047 to (Last Rx:22Nov2013)  Requested for: 22Nov2013 Ordered   9  Furosemide 20 MG Oral Tablet; take 1 tablet daily as needed; Therapy: 91Xak3502 to (Last Rx:25Ywe5515)  Requested for: 23Ydk4009 Ordered   10  Gabapentin 100 MG Oral Capsule; TAKE 1 CAPSULE 3 TIMES DAILY; Therapy: 77TJE6317 to (Evaluate:08Udn4407)  Requested for: 17ZGQ1090; Last    Rx:11Nov2015 Ordered   11  Galantamine Hydrobromide 4 MG Oral Tablet; Take 1 tablet daily; Therapy: 65NYR3298 to (Last Rx:29Oct2015)  Requested for: 29Oct2015 Ordered   12  Hydrocodone-Acetaminophen 5-325 MG Oral Tablet; TAKE 1 TABLET EVERY 4 TO 6    HOURS AS NEEDED FOR PAIN;    Therapy: 40LWV6231 to (Evaluate:17Nov2014); Last Rx:16Mbi5481 Ordered   13  Pantoprazole Sodium 40 MG Oral Tablet Delayed Release; Take 1 tablet daily; Therapy: 17TZI7189 to (Last Rx:18Jan2016)  Requested for: 61GKD9432 Ordered   14  Potassium Chloride Krista ER 10 MEQ Oral Tablet Extended Release; Take one pill daily    in conjunction with furosemide only; Therapy: 72Eet1833 to (Last Rx:46Oia2218)  Requested for: 87Rnh4248 Ordered   15   Restasis 0 05 % Ophthalmic Emulsion; Therapy: 01Fuq4000 to (Samantha Horton) Recorded   16  Rollator Miscellaneous; USE AS DIRECTED; Therapy: 89VAQ3919 to (Last Rx:25Nov2015) Ordered   17  Tamsulosin HCl - 0 4 MG Oral Capsule; TAKE 1 CAPSULE AT BEDTIME; Therapy: 04BOG4777 to (Last Rx:29Jan2015)  Requested for: 57TVW2053 Ordered   18  Trimethoprim 100 MG Oral Tablet; TAKE 1 TABLET EVERY 12 HOURS DAILY; Therapy: 75XMK4330 to (Evaluate:34Ysj1699)  Requested for: 45HMW2512; Last    Rx:57Hkh4445 Ordered   19  VESIcare 5 MG Oral Tablet; One pill at bedtime; Therapy: 38VBN0815 to (Evaluate:77Muu7088)  Requested for: 66RYP2964; Last    Rx:95Zsd5908 Ordered   20  Warfarin Sodium 2 MG Oral Tablet; Take 1 tablet daily as directed; Therapy: 75LWL9700 to (Evaluate:94Wci5921)  Requested for: 63Udb5153; Last    Rx:56Dpf1915 Ordered   21  Warfarin Sodium 5 MG Oral Tablet; Take 1 tablet daily as directed; Therapy: 21ZTV3300 to (Last [facility])  Requested for: 89Juy3019 Ordered   22  Zolpidem Tartrate 5 MG Oral Tablet; TAKE 1 TABLET AT BEDTIME AS NEEDED; Therapy: 18VZQ3551 to (Evaluate:04Jun2016); Last Rx:59Uzz6710 Ordered   23  Zolpidem Tartrate 5 MG Oral Tablet; TAKE 1 TABLET AT BEDTIME AS NEEDED; Therapy: 42RKX2994 to (Last Rx:65Tgd9595)  Requested for: 80NBW7686 Ordered    Allergies    1  CeleBREX CAPS   2  Sulfa Drugs    3  No Known Environmental Allergies   4  No Known Food Allergies    Vitals   Recorded: 82BGH3586 02:21PM   Temperature 96 1 F   Heart Rate 101   Respiration 20   Systolic 772   Diastolic 78   Height 5 ft 6 in   Weight 189 lb 2 08 oz   BMI Calculated 30 53   BSA Calculated 1 96   O2 Saturation 98     Physical Exam    Constitutional   General appearance: Abnormal   Very frail 49-year-old male who is awake alert  Patient seems less anxious than previous meetings  Eyes   Conjunctiva and lids: No swelling, erythema, or discharge  Pupils and irises: Equal, round and reactive to light      Ears, Nose, Mouth, and Throat   External inspection of ears and nose: Normal     Otoscopic examination: Tympanic membrance translucent with normal light reflex  Canals patent without erythema  Nasal mucosa, septum, and turbinates: Normal without edema or erythema  Oropharynx: Abnormal   Pink slightly dry mucous membranes  Pulmonary   Respiratory effort: No increased work of breathing or signs of respiratory distress  Auscultation of lungs: Clear to auscultation, equal breath sounds bilaterally, no wheezes, no rales, no rhonci  Cardiovascular   Palpation of heart: Normal PMI, no thrills  Auscultation of heart: Normal rate and rhythm, normal S1 and S2, without murmurs  Examination of extremities for edema and/or varicosities: Abnormal   Trace to +1 bilateral lower extremities which is improved dramatically from last visit  Patient does have support groups and place her both legs which may be helping  Carotid pulses: Normal     Abdomen   Abdomen: Abnormal   obese soft nontender patient positive bowel sounds x4 quadrants  Liver and spleen: No hepatomegaly or splenomegaly  Lymphatic   Palpation of lymph nodes in neck: No lymphadenopathy  Musculoskeletal   Gait and station: Abnormal   Very slow to transfer and walking with a walker with a hunched over gait  Digits and nails: Abnormal   Diffuse arthritic changes to the hands and digits  Inspection/palpation of joints, bones, and muscles: Abnormal   Diffuse osteoarthritis but nothing acute were changed from previously  Skin   Skin and subcutaneous tissue: Abnormal   some stasis changes bilaterally lower extremity including some drying of the skin  Neurologic   Cranial nerves: Cranial nerves 2-12 intact  Reflexes: Abnormal   absent patella and Achilles tendon reflexes  Sensation: No sensory loss      Psychiatric   Orientation to person, place and time: Normal     Mood and affect: Abnormal   Pleasant affect and seems to have made some adjustments emotionally with the loss of his wife  Future Appointments    Date/Time Provider Specialty Site   04/26/2016 02:45 PM Maddy Andrade DO Internal Medicine [facility]   10/11/2016 01:00 PM Cardiology, [address]   04/06/2016 01:30 PM Cardiology, Device Remote   [facility]   03/01/2016 01:40 PM RADHA Francis   Orthopedic Surgery [facility]   01/24/2017 01:00 PM Yael Beck, [facility] Urology [address]     Signatures   Electronically signed by : Sherrill Nuñez DO; Jan 26 2016  3:45PM EST                       (Author)

## 2018-01-12 VITALS
RESPIRATION RATE: 18 BRPM | TEMPERATURE: 97.4 F | HEIGHT: 67 IN | SYSTOLIC BLOOD PRESSURE: 122 MMHG | BODY MASS INDEX: 28.25 KG/M2 | WEIGHT: 180 LBS | HEART RATE: 72 BPM | DIASTOLIC BLOOD PRESSURE: 62 MMHG | OXYGEN SATURATION: 96 %

## 2018-01-12 VITALS — SYSTOLIC BLOOD PRESSURE: 108 MMHG | HEIGHT: 67 IN | DIASTOLIC BLOOD PRESSURE: 60 MMHG | HEART RATE: 72 BPM

## 2018-01-12 NOTE — MISCELLANEOUS
History of Present Illness  TCM Communication [facility]: The patient is being contacted for follow-up after hospitalization and [facility]  He was hospitalized at and [facility]  The date of admission: 4/11/2016, date of discharge: 4/14/2016  Diagnosis: SOB, Pneumonia  He was discharged to a rehabilitation center, [facility]  He scheduled a follow up appointment  He did not schedule a follow up appointment  The patient is currently asymptomatic  Referrals Needed:  Currently in [facility]  Counseling was provided to patient's caretaker  Communication performed and completed by Hanh Crowell      Active Problems    1  Adjustment disorder with mixed anxiety and depressed mood (309 28) (F43 23)   2  Allergic rhinitis (477 9) (J30 9)   3  Arthralgia of right shoulder region (719 41) (M25 511)   4  Arthralgia Of The Right Pelvis/Hip/Femur (719 45)   5  Arthropathy of lumbar facet joint (721 3) (M46 96)   6  Atrial fibrillation (427 31) (I48 91)   7  Back pain (724 5) (M54 9)   8  Benign Localized Prostatic Hyperplasia Without Urinary Obstruction With Other Lower   Urinary Tract Symptoms (600 20)   9  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 01,599 69) (N40 1,N13 8)   10  CAD (coronary artery disease) (414 00) (I25 10)   11  Carpal tunnel syndrome (354 0) (G56 00)   12  Compression fracture (829 0) (T14 8)   13  DDD (degenerative disc disease), lumbar (722 52) (M51 36)   14  Depression (311) (F32 9)   15  DJD (degenerative joint disease) of pelvis (715 95) (M16 9)   16  Dyspnea (786 09) (R06 00)   17  Dysuria (788 1) (R30 0)   18  Edema (782 3) (R60 9)   19  Esophageal reflux (530 81) (K21 9)   20  Fall (E888 9) (W19 XXXA)   21  Fracture of radius, distal, closed (813 42) (S52 509A)   22  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)   23  Hypertension (401 9) (I10)   24  Insomnia (780 52) (G47 00)   25  Lumbar canal stenosis (724 02) (M48 06)   26   Need for immunization against influenza (V04 81) (Z23)   27  Need for prophylactic vaccination and inoculation against influenza (V04 81) (Z23)   28  Nocturia (788 43) (R35 1)   29  Orthopedic Aftercare For Healing Traumatic Fx Lower Arm (V54 12)   30  Osteoarthritis of knee, unilateral (715 16) (M17 9)   31  Osteoarthrosis (715 90) (M19 90)   32  Other chronic pain (338 29) (G89 29)   33  Pain in joint of right hip (719 45) (M25 551)   34  Pressure ulcer, buttock (707 05,707 20) (L89 309)   35  Prostatitis (601 9) (N41 9)   36  Sick sinus syndrome (427 81) (I49 5)   37  Skin tear (879 8)   38  Sleep disorder (780 50) (G47 9)   39  Symptoms involving urinary system (788 99) (R39 9)   40  Thyroid nodule (241 0) (E04 1)   41  Tinea cruris (110 3) (B35 6)   42  Urgency of urination (788 63) (R39 15)   43  URI (upper respiratory infection) (465 9) (J06 9)   44  Urinary frequency (788 41) (R35 0)   45  UTI (urinary tract infection) (599 0) (N39 0)    Past Medical History    1  History of Acute deep vein thrombosis of lower limb, unspecified laterality   2  History of Arthritis (V13 4)   3  History of Depression (311) (F32 9)   4  History of gastroesophageal reflux (GERD) (V12 79) (Z87 19)   5  Need for prophylactic vaccination and inoculation against influenza (V04 81) (Z23)   6  History of Nephrolithiasis (V13 01)   7  History of Organic impotence (607 84) (N52 9)   8  History of Pacemaker Evaluation Adjustment Of Cardiac Pacemaker   9  History of Tachycardia (785 0) (R00 0)   10  History of Venous insufficiency (chronic) (peripheral) (459 81) (I87 2)    Surgical History    1  History of Appendectomy   2  History of Back Surgery   3  History of Cholecystectomy   4  History of Diagnostic Cystoscopy   5  History of Gallbladder Surgery   6  History of Hip Surgery   7  Orthopedic Aftercare For Healing Traumatic Fx Lower Arm (V54 12)   8  History of Pacemaker Placement   9  History of Pacemaker Placement    Family History    1   Family history of Heart Disease (V17 49)    2  Family history of Cancer   3  Family history of Reported Family History Of Cancer   4  Family history of Reported Family History Of Heart Disease   5  Family history of Stroke Syndrome (V17 1)    Social History    · Denied: History of Alcohol Use (History)   · Being A Social Drinker   · Denied: History of Current Smoker   · Denied: History of Drug Use   · Former smoker (V15 82) (X78 751)   · Never A Smoker   ·     Current Meds   1  Centrum Silver Oral Tablet Recorded   2  Ciprofloxacin HCl - 500 MG Oral Tablet; TABS PO;   Therapy: 67MDM0389-; Last Rx:60Qsr0727; Status: NEED INFORMATION - Problem   Ordered   3  [employer]S Allergy Relief 180 MG Oral Tablet Recorded   4  [employer]S Vitamin D CAPS Recorded   5  Famciclovir 125 MG Oral Tablet; Take 1 tablet daily; Therapy: 99HPO5528 to (Last Rx:05Oct2015)  Requested for: 05Oct2015 Ordered   6  Finasteride 5 MG Oral Tablet; TAKE 1 TABLET AT BEDTIME; Therapy: 05NCW9434 to (Last Rx:05Oct2015)  Requested for: 61ASA8151 Ordered   7  Flecainide Acetate 50 MG Oral Tablet; Take 1 tablet daily; Therapy: (Recorded:06Oct2015) to Recorded   8  Fluticasone Propionate 50 MCG/ACT Nasal Suspension; USE 1 SPRAY IN EACH   NOSTRIL TWICE DAILY; Therapy: 23VDF3436 to (Last Rx:22Nov2013)  Requested for: 22Nov2013 Ordered   9  Furosemide 20 MG Oral Tablet; take 1 tablet daily as needed; Therapy: 59Jzr9173 to (Last Rx:30Ess1129)  Requested for: 99Rka7349 Ordered   10  Gabapentin 100 MG Oral Capsule; TAKE 1 CAPSULE 3 TIMES DAILY; Therapy: 35IQV4582 to (Evaluate:31Hfq9114)  Requested for: 80ZHR3373; Last    Rx:11Nov2015 Ordered   11  Galantamine Hydrobromide 4 MG Oral Tablet; Take 1 tablet daily; Therapy: 91TKN0282 to (Last Rx:29Oct2015)  Requested for: 29Oct2015 Ordered   12  Hydrocodone-Acetaminophen 5-325 MG Oral Tablet; TAKE 1 TABLET EVERY 4 TO 6    HOURS AS NEEDED FOR PAIN;    Therapy: 13NIO2334 to (Evaluate:17Nov2014); Last Rx:33Gva4403 Ordered   13  Pantoprazole Sodium 40 MG Oral Tablet Delayed Release; Take 1 tablet daily; Therapy: 84JSV0284 to (Last Rx:18Jan2016)  Requested for: 36OMR4383 Ordered   14  Potassium Chloride Krista ER 10 MEQ Oral Tablet Extended Release; Take one pill daily    in conjunction with furosemide only; Therapy: 07Swp6954 to (Last Rx:36Yza7159)  Requested for: 71Mnl3258 Ordered   15  Restasis 0 05 % Ophthalmic Emulsion; Therapy: 23Hho4071 to (Anup Falcon) Recorded   16  Rollator Miscellaneous; USE AS DIRECTED; Therapy: 96PMM5996 to (Last Rx:25Nov2015) Ordered   17  Tamsulosin HCl - 0 4 MG Oral Capsule; TAKE 1 CAPSULE AT BEDTIME; Therapy: 45CIJ6245 to (Last Rx:29Jan2015)  Requested for: 98WVJ1415 Ordered   18  Trimethoprim 100 MG Oral Tablet; TAKE 1 TABLET EVERY 12 HOURS DAILY; Therapy: 99XNH9373 to (Evaluate:60Vxg6023)  Requested for: 01OON3153; Last    Rx:74Bhx4138 Ordered   19  VESIcare 5 MG Oral Tablet; One pill at bedtime; Therapy: 58BJS5883 to (Evaluate:19Wbb5627)  Requested for: 15Rdj6609; Last    Rx:07Xiw7999 Ordered   20  Warfarin Sodium 2 MG Oral Tablet; Take 1 tablet daily as directed; Therapy: 60PEK5411 to (Evaluate:40Csh8383)  Requested for: 97Fvn8104; Last    Rx:11Qti8012 Ordered   21  Warfarin Sodium 5 MG Oral Tablet; Take 1 tablet daily as directed; Therapy: [facility]ZDQ0293 to [facility]  Requested for: 09Xnx6238 Ordered   22  Zolpidem Tartrate 5 MG Oral Tablet; TAKE ONE TABLET BY MOUTH AT BEDTIME AS    NEEDED FOR SLEEP; Therapy: 87RYH3259 to (Evaluate:68Rhn6177); Last Rx:07Mar2016 Ordered    Allergies    1  CeleBREX CAPS   2  Sulfa Drugs    3  No Known Environmental Allergies   4   No Known Food Allergies    Future Appointments    Date/Time Provider Specialty Site   04/26/2016 02:45 PM Gricelda Pierre DO Internal Medicine [facility] INTERNAL MED   10/11/2016 01:00 PM Cardiology, [address] [facility]   01/24/2017 01:00 PM Artur Handy [facility] Urology [address] [place] [address]     Signatures   Electronically signed by : Nathalia Gonzalez OM; Apr 15 2016 12:32PM EST                       (Author)    Electronically signed by : Alex Garcia DO;  Apr 21 2016  5:01PM EST                       (Author)

## 2018-01-13 VITALS
BODY MASS INDEX: 28.25 KG/M2 | TEMPERATURE: 96.6 F | OXYGEN SATURATION: 98 % | SYSTOLIC BLOOD PRESSURE: 124 MMHG | DIASTOLIC BLOOD PRESSURE: 80 MMHG | HEART RATE: 100 BPM | RESPIRATION RATE: 16 BRPM | HEIGHT: 67 IN | WEIGHT: 180 LBS

## 2018-01-13 VITALS — SYSTOLIC BLOOD PRESSURE: 118 MMHG | DIASTOLIC BLOOD PRESSURE: 74 MMHG | RESPIRATION RATE: 20 BRPM | HEART RATE: 95 BPM

## 2018-01-13 VITALS
HEIGHT: 67 IN | OXYGEN SATURATION: 96 % | SYSTOLIC BLOOD PRESSURE: 122 MMHG | HEART RATE: 87 BPM | RESPIRATION RATE: 16 BRPM | TEMPERATURE: 97.4 F | DIASTOLIC BLOOD PRESSURE: 78 MMHG

## 2018-01-14 VITALS
DIASTOLIC BLOOD PRESSURE: 78 MMHG | HEART RATE: 82 BPM | SYSTOLIC BLOOD PRESSURE: 130 MMHG | OXYGEN SATURATION: 97 % | RESPIRATION RATE: 16 BRPM | TEMPERATURE: 97.7 F

## 2018-01-14 VITALS
OXYGEN SATURATION: 96 % | SYSTOLIC BLOOD PRESSURE: 124 MMHG | RESPIRATION RATE: 12 BRPM | DIASTOLIC BLOOD PRESSURE: 78 MMHG | HEART RATE: 90 BPM | TEMPERATURE: 97.4 F

## 2018-01-14 VITALS
WEIGHT: 180 LBS | HEIGHT: 67 IN | BODY MASS INDEX: 28.25 KG/M2 | SYSTOLIC BLOOD PRESSURE: 115 MMHG | DIASTOLIC BLOOD PRESSURE: 69 MMHG | TEMPERATURE: 97.5 F | HEART RATE: 87 BPM | OXYGEN SATURATION: 97 %

## 2018-01-14 VITALS — DIASTOLIC BLOOD PRESSURE: 69 MMHG | HEART RATE: 80 BPM | SYSTOLIC BLOOD PRESSURE: 116 MMHG | RESPIRATION RATE: 18 BRPM

## 2018-01-14 VITALS
SYSTOLIC BLOOD PRESSURE: 112 MMHG | HEART RATE: 85 BPM | DIASTOLIC BLOOD PRESSURE: 70 MMHG | HEIGHT: 67 IN | OXYGEN SATURATION: 94 % | TEMPERATURE: 97.3 F

## 2018-01-14 VITALS
TEMPERATURE: 97.7 F | HEART RATE: 97 BPM | DIASTOLIC BLOOD PRESSURE: 84 MMHG | RESPIRATION RATE: 16 BRPM | OXYGEN SATURATION: 96 % | SYSTOLIC BLOOD PRESSURE: 138 MMHG

## 2018-01-14 VITALS
OXYGEN SATURATION: 96 % | DIASTOLIC BLOOD PRESSURE: 82 MMHG | HEART RATE: 89 BPM | RESPIRATION RATE: 16 BRPM | TEMPERATURE: 97.2 F | SYSTOLIC BLOOD PRESSURE: 114 MMHG

## 2018-01-15 NOTE — MISCELLANEOUS
Assessment    1  Acute sinusitis (461 9) (J01 90)   2  Atrial fibrillation (427 31) (I48 91)   3  DJD (degenerative joint disease) of pelvis (715 95) (M16 9)   4  Hypertension (401 9) (I10)    Plan  Need for immunization against influenza    · Fluzone High-Dose 0 5 ML Intramuscular Suspension Prefilled Syringe   For: Need for immunization against influenza; Ordered By:Suzi Panda La Fuente ; Effective Date:2016; Administered by: Gautam Gupta: 2016 3:31:00 PM; Last Updated By: Gautam Gupta; 2016 3:31:51 PM    Discussion/Summary  Discussion Summary:   #1  Acute sinusitis  Patient states he feels somewhat better since she's been placed on antibiotics  Patient at this point in time as noted nasal congestion cough, fever, chills  Patient was told if recurrence of symptoms to please call the office rather than proceeding to the emergency room  #2  Chronic atrial fibrillation  Patient's heart rate was controlled prior to admission, during his short admission and today  Patient remains on Coumadin  Because of the patient being placed on antibiotics and they did decrease his Coumadin dose and we are following this closely with his pro times to make sure he stays therapeutic  #3  Severe DJD with ambulatory dysfunction  Patient continues to ambulate with a walker with a very slow but steady gait  Patient has no new arthritic aches and pains  #4  Hypertension  Patient's blood pressure is relatively well controlled  Because of worries about cerebral perfusion patient will continue with present treatment  Counseling Documentation With Imm: The patient was counseled regarding diagnostic results, instructions for management, risk factor reductions, prognosis, patient and family education, impressions, risks and benefits of treatment options, importance of compliance with treatment  Immunization Counseling The parent/guardian was counseled on the following vaccine components: Influenza   Total number of vaccine components counseled: 1  Medication SE Review and Pt Understands Tx: Possible side effects of new medications were reviewed with the patient/guardian today  The treatment plan was reviewed with the patient/guardian  The patient/guardian understands and agrees with the treatment plan      Chief Complaint  Chief Complaint Free Text Note Form: Here for follow-up after recent hospitalization  History of Present Illness  TCM Communication [place]: The patient is being contacted for follow-up after hospitalization and [facility]  He was hospitalized at [facility]  The date of admission: 9/23/2016, date of discharge: 6/24/2016  Diagnosis: Sinus infection, lethargy  He was discharged to home, to an assisted living facility, [facility]  He scheduled a follow up appointment  Symptoms: fatigue and cough  The patient is currently experiencing symptoms  Nose drainage Counseling was provided to the patient and patient's family  Daughter  Topics counseled included diagnostic results, instructions for management, risk factor reductions, prognosis, home health agency benefits, activities of daily living and importance of compliance with treatment  Communication performed and completed by Nicolas Gleason   HPI: Patient is a 80-year-old male with a history of multiple medical problems as outlined previously including hypertension, chronic A  fib, DJD with ambulatory dysfunction  Patient is here today for transition of care visit after recent hospitalization  Patient states on the day of admission to the hospital he was feeling slightly out of sorts and his home healthcare aide thought he looked pale and sick and arranged for transportation of the patient to the emergency room for evaluation  Patient was admitted to the hospital with a mild febrile illness and did have a complete workup and evaluation including chest x-ray and blood studies   Patient on examination was found to have a sinus infection and was placed on by mouth Augmentin 500 mg twice a day and was discharged from the hospital the next day  Patient states she's feeling well or at least back to normal and has no new complaints or problems  Review of Systems  Complete-Male:   Constitutional: feeling poorly and feeling tired, but no fever, no recent weight gain, no chills and no recent weight loss  Eyes: eyesight problems, but no eye pain, no dryness of the eyes, no purulent discharge from the eyes and no itching of the eyes  ENT: hearing loss, hoarseness and Dry mouth hoarseness, but no earache, no nosebleeds, no sore throat and no nasal discharge  Cardiovascular: lower extremity edema and Chronic lower extremity edema which is unchanged, but the heart rate was not slow, no chest pain and no intermittent leg claudication  Respiratory: shortness of breath during exertion and Chronic shortness of breath with brisk exertion, but no shortness of breath, no cough, no orthopnea and no PND  Gastrointestinal: constipation, but as noted in HPI, no abdominal pain, no nausea, no vomiting and no blood in stools  Genitourinary: incontinence and nocturia, but no dysuria, no urinary hesitancy, no genital lesions and no testicular pain  Musculoskeletal: arthralgias, joint stiffness and limb swelling, but no joint swelling and no limb pain  Integumentary: no rashes, no itching, no dry skin, no skin lesions and no skin wound  Neurological: limb weakness and difficulty walking, but no headache, no numbness, no tingling, no confusion, no dizziness, no convulsions and no fainting  Psychiatric: anxiety, depression and Patient's emotional state is somewhat improved but still has mild grief reaction with loss his wife, but not suicidal, no personality change, no sleep disturbances and no emotional problems     Endocrine: No complaints of proptosis, no hot flashes, no muscle weakness, no erectile dysfunction, no deepening of the voice, no feelings of weakness  Hematologic/Lymphatic: No complaints of swollen glands, no swollen glands in the neck, does not bleed easily, no easy bruising  ROS Reviewed:   ROS reviewed  Active Problems    1  Adjustment disorder with mixed anxiety and depressed mood (309 28) (F43 23)   2  Allergic rhinitis (477 9) (J30 9)   3  Arthralgia of right shoulder region (719 41) (M25 511)   4  Arthralgia Of The Right Pelvis/Hip/Femur (719 45)   5  Arthropathy of lumbar facet joint (721 3) (M12 88)   6  Atrial fibrillation (427 31) (I48 91)   7  Back pain (724 5) (M54 9)   8  Benign Localized Prostatic Hyperplasia Without Urinary Obstruction With Other Lower   Urinary Tract Symptoms (600 20)   9  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 01,599 69) (N40 1,N13 8)   10  CAD (coronary artery disease) (414 00) (I25 10)   11  Carpal tunnel syndrome (354 0) (G56 00)   12  Compression fracture (829 0) (T14 8)   13  DDD (degenerative disc disease), lumbar (722 52) (M51 36)   14  Depression (311) (F32 9)   15  DJD (degenerative joint disease) of pelvis (715 95) (M16 9)   16  Dyspnea (786 09) (R06 00)   17  Dysuria (788 1) (R30 0)   18  Edema (782 3) (R60 9)   19  Esophageal reflux (530 81) (K21 9)   20  Fall (E888 9) (W19 XXXA)   21  Fracture of radius, distal, closed (813 42) (S52 509A)   22  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)   23  Hypertension (401 9) (I10)   24  Insomnia (780 52) (G47 00)   25  Lumbar canal stenosis (724 02) (M48 06)   26  Need for immunization against influenza (V04 81) (Z23)   27  Need for prophylactic vaccination and inoculation against influenza (V04 81) (Z23)   28  Nocturia (788 43) (R35 1)   29  Orthopedic Aftercare For Healing Traumatic Fx Lower Arm (V54 12)   30  Osteoarthritis of knee, unilateral (715 16) (M17 9)   31  Osteoarthrosis (715 90) (M19 90)   32  Other chronic pain (338 29) (G63 29)   33  Pain in joint of right hip (531 45) (H05 859)   34   Pressure ulcer, buttock (873 37,020 09) (L89 309)   35  Prostatitis (601 9) (N41 9)   36  Sick sinus syndrome (427 81) (I49 5)   37  Skin tear (879 8)   38  Sleep disorder (780 50) (G47 9)   39  Symptoms involving urinary system (788 99) (R39 9)   40  Thyroid nodule (241 0) (E04 1)   41  Tinea cruris (110 3) (B35 6)   42  Urgency of urination (788 63) (R39 15)   43  URI (upper respiratory infection) (465 9) (J06 9)   44  Urinary frequency (788 41) (R35 0)   45  UTI (urinary tract infection) (599 0) (N39 0)   46  Yeast infection of the skin (112 3) (B37 2)    Past Medical History    1  History of Acute deep vein thrombosis of lower limb, unspecified laterality   2  History of Arthritis (V13 4)   3  History of Depression (311) (F32 9)   4  History of gastroesophageal reflux (GERD) (V12 79) (Z87 19)   5  Need for prophylactic vaccination and inoculation against influenza (V04 81) (Z23)   6  History of Nephrolithiasis (V13 01)   7  History of Organic impotence (607 84) (N52 9)   8  History of Pacemaker Evaluation Adjustment Of Cardiac Pacemaker   9  History of Tachycardia (785 0) (R00 0)   10  History of Venous insufficiency (chronic) (peripheral) (459 81) (I87 2)    Surgical History    1  History of Appendectomy   2  History of Back Surgery   3  History of Cholecystectomy   4  History of Diagnostic Cystoscopy   5  History of Gallbladder Surgery   6  History of Hip Surgery   7  Orthopedic Aftercare For Healing Traumatic Fx Lower Arm (V54 12)   8  History of Pacemaker Placement   9  History of Pacemaker Placement    Family History  Brother    1  Family history of Heart Disease (V17 49)  Family History    2  Family history of Cancer   3  Family history of Reported Family History Of Cancer   4  Family history of Reported Family History Of Heart Disease   5   Family history of Stroke Syndrome (V17 1)    Social History    · Denied: History of Alcohol Use (History)   · Being A Social Drinker   · Denied: History of Current Smoker   · Denied: History of Drug Use   · Former smoker (R85 72) (S26 163)   · Never A Smoker   ·     Current Meds   1  Centrum Silver Oral Tablet Recorded   2  Ciprofloxacin HCl - 500 MG Oral Tablet; TABS PO;   Therapy: 73HJI1915-; Last Rx:04Xau3560; Status: NEED INFORMATION - Problem   Ordered   3  [employer] Allergy Relief 180 MG Oral Tablet Recorded   4  [employer] Vitamin D CAPS Recorded   5  Famciclovir 125 MG Oral Tablet; Take 1 tablet daily; Therapy: 51YHG6793 to (Last [facility])  Requested for: 15Ylg6678 Ordered   6  Finasteride 5 MG Oral Tablet; TAKE 1 TABLET AT BEDTIME; Therapy: 30LCN0457 to (Last [facility])  Requested for: 61Euk7968 Ordered   7  Flecainide Acetate 50 MG Oral Tablet; Take 1 tablet daily; Therapy: 93GJT6424 to (Last Rx:27Nyo0942)  Requested for: 17ZMI7643 Ordered   8  Fluticasone Propionate 50 MCG/ACT Nasal Suspension; USE 1 SPRAY IN EACH   NOSTRIL TWICE DAILY; Therapy: 92LVT6897 to (Last Rx:22Nov2013)  Requested for: 22Nov2013 Ordered   9  Furosemide 20 MG Oral Tablet; take 1 tablet daily as needed; Therapy: 26Ait3490 to (Last Rx:97Cfa4916)  Requested for: 99Vgz7920 Ordered   10  Gabapentin 100 MG Oral Capsule; take 1 capsule three times a day; Therapy: 67BWW0439 to (Last Rx:84Jjp9592)  Requested for: 08RWW5073 Ordered   11  Galantamine Hydrobromide 4 MG Oral Tablet; Take 1 tablet daily; Therapy: 46RPN7997 to (Last Rx:95Rqe7345)  Requested for: 00Feh2700 Ordered   12  Hydrocodone-Acetaminophen 5-325 MG Oral Tablet; TAKE 1 TABLET EVERY 4 TO 6    HOURS AS NEEDED FOR PAIN;    Therapy: 83JVR9070 to (Evaluate:17Nov2014); Last Rx:51Pes8749 Ordered   13  Nystatin 683595 UNIT/GM External Powder; APPLY SPARINGLY TO AFFECTED AREA(S)    TWICE DAILY; Therapy: 52MTM7767 to (Last CP:85CDK1840)  Requested for: 14DDA5458 Ordered   14  Pantoprazole Sodium 40 MG Oral Tablet Delayed Release; Take 1 tablet daily; Therapy: 20SDV7031 to (Last Rx:18Jan2016)  Requested for: 74PZV6605 Ordered   15   Phenazopyridine HCl - 200 MG Oral Tablet; take one tablet three times per day as    needed for urinary frequency and burning; Therapy: 06Cbc1248 to (Last Rx:02Iyv9616)  Requested for: 38Poo6080 Ordered   16  Potassium Chloride Krista ER 10 MEQ Oral Tablet Extended Release; Take one pill daily    in conjunction with furosemide only; Therapy: 66Umc5745 to (Last Rx:70Fat9589)  Requested for: 78Xws4311 Ordered   17  Restasis 0 05 % Ophthalmic Emulsion; Therapy: 59Zgd2921 to (Deniz Goyal) Recorded   18  Rollator Miscellaneous; USE AS DIRECTED; Therapy: 79RSJ0914 to (Last Rx:50Bwz8233) Ordered   19  Tamsulosin HCl - 0 4 MG Oral Capsule; TAKE 1 CAPSULE AT BEDTIME; Therapy: 16VNK5518 to (Last Rx:04Ycn7406)  Requested for: 85TDM8775 Ordered   20  Trimethoprim 100 MG Oral Tablet; TAKE 1 TABLET EVERY 12 HOURS DAILY; Therapy: 70AQA5556 to (Evaluate:41Wmz9843)  Requested for: 52POC5654; Last    Rx:03Stp8787 Ordered   21  VESIcare 5 MG Oral Tablet; One pill at bedtime; Therapy: 69HEU5702 to (Evaluate:81Gjv1918)  Requested for: 72Ddl3194; Last    Rx:06Zus1102 Ordered   22  Warfarin Sodium 2 MG Oral Tablet; Take 1 tablet daily as directed; Therapy: 49BGH6707 to (Evaluate:89Mvl2750)  Requested for: 10Mqi5662; Last    Rx:96Rlz5956 Ordered   23  Warfarin Sodium 5 MG Oral Tablet; Take 1 tablet daily as directed; Therapy: 30DVS5179 to (Last Meryl Leblanc)  Requested for: 65Vzo1114 Ordered   24  Zolpidem Tartrate 5 MG Oral Tablet; TAKE ONE TABLET BY MOUTH AT BEDTIME AS    NEEDED FOR SLEEP; Therapy: 61QVX5077 to (Evaluate:08Mar2017); Last Rx:04Vrb0879 Ordered    Allergies    1  CeleBREX CAPS   2  Sulfa Drugs    3  No Known Environmental Allergies   4  No Known Food Allergies    Physical Exam    Constitutional   General appearance: Abnormal   Very frail 59-year-old male who is awake alert  Patient seems less anxious than previous meetings  Eyes   Conjunctiva and lids: No swelling, erythema, or discharge      Pupils and irises: Equal, round and reactive to light  Ears, Nose, Mouth, and Throat   External inspection of ears and nose: Normal     Otoscopic examination: Tympanic membrance translucent with normal light reflex  Canals patent without erythema  Nasal mucosa, septum, and turbinates: Normal without edema or erythema  Oropharynx: Abnormal   Pink slightly dry mucous membranes  Pulmonary   Respiratory effort: No increased work of breathing or signs of respiratory distress  Auscultation of lungs: Clear to auscultation, equal breath sounds bilaterally, no wheezes, no rales, no rhonci  Cardiovascular   Palpation of heart: Normal PMI, no thrills  Auscultation of heart: Normal rate and rhythm, normal S1 and S2, without murmurs  Examination of extremities for edema and/or varicosities: Abnormal   Trace to +1 bilateral lower extremities which is improved dramatically from last visit  Patient does have support groups and place her both legs which may be helping  Carotid pulses: Normal     Abdomen   Abdomen: Abnormal   obese soft nontender patient positive bowel sounds x4 quadrants  Liver and spleen: No hepatomegaly or splenomegaly  Lymphatic   Palpation of lymph nodes in neck: No lymphadenopathy  Musculoskeletal   Gait and station: Abnormal   Very slow to transfer and walking with a walker with a hunched over gait  Digits and nails: Abnormal   Diffuse arthritic changes to the hands and digits  Inspection/palpation of joints, bones, and muscles: Abnormal   Diffuse osteoarthritis but nothing acute were changed from previously  Skin   Skin and subcutaneous tissue: Abnormal   some stasis changes bilaterally lower extremity including some drying of the skin  Neurologic   Cranial nerves: Cranial nerves 2-12 intact  Reflexes: Abnormal   absent patella and Achilles tendon reflexes  Sensation: No sensory loss      Psychiatric   Orientation to person, place and time: Normal     Mood and affect: Abnormal   Pleasant affect  Future Appointments    Date/Time Provider Specialty Site   10/11/2016 01:00 PM Cardiology, [address] [facility]   01/11/2017 09:30 AM Cardiology, Device Remote  [facility]   10/25/2016 01:30 PM Urania Done, [address]  Urology [facility] [place]   10/06/2016 10:00 AM RADHA Guillermo   Cardiology [facility] CARDIOLOGY [place]   01/24/2017 01:00 PM Chyna Isaacs [facility] Urology  [address] [facility]     Signatures   Electronically signed by : Madeline Stanton OM; Sep 27 2016  8:24AM EST                       (Author)    Electronically signed by : Nyasia Lara DO; Sep 29 2016  5:09PM EST                       (Author)

## 2018-01-16 NOTE — PROGRESS NOTES
Assessment    1  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 01) (N40 1)   2  Nocturia (788 43) (R35 1)   3  Urgency of urination (788 63) (R39 15)    Plan  Urgency of urination    · Urine Dip Non-Automated- POC; Status:Complete - Retrospective By Protocol  Authorization;   Done: 47MAF2720 02:22PM   Performed: In Office; OXF:[id number]; Last Updated By:Rupa, Kerin Olszewski; 1/19/2016 2:25:00 PM;Ordered; For:Urgency of urination; Ordered By:Ginna Romero; Discussion/Summary  Discussion Summary:   Melissa Camargo is a 80year old gentleman with urgency and nocturia  We discussed continuing the Vesicare  It does provide some relief  We discussed behavioral modifications for this as well  Follow up will be in 1 year  Understands and agrees with treatment plan: The treatment plan was reviewed with the patient/guardian  The patient/guardian understands and agrees with the treatment plan      Chief Complaint  Chief Complaint Free Text Note Form: BPH,urinary urgency,nocturia      History of Present Illness  HPI: Melissa Camargo is a 80year old gentleman who presents for follow up on his BPH and nocturnal enuresis  Continues with Vesicare, Finasteride and Tamsulosin  Has nocturia 2-3 with incontinence  Admits to drinking through the night  Denies any hematuria or dysuria  Stream is fair  No interval health changes  Does have limited mobility  Has been on increased Lasix for leg edema  Myrbetriq caused him severe side effects  Review of Systems  Complete-Male Urology:   Constitutional: No fever or chills, feels well, no tiredness, no recent weight gain or weight loss  Respiratory: No complaints of shortness of breath, no wheezing, no cough, no SOB on exertion, no orthopnea or PND  Cardiovascular: lower extremity edema  Gastrointestinal: No complaints of abdominal pain, no constipation, no nausea or vomiting, no diarrhea or bloody stools     Genitourinary: Empty sensation (occasional), incontinence, feelings of urinary urgency and stream quality fair, but no dysuria and no hematuria    The patient presents with complaints of urinary hesitancy (once in a while)  The patient presents with complaints of nocturia (2x4)   Musculoskeletal: No complaints of arthralgia, no myalgias, no joint swelling or stiffness, no limb pain or swelling  Integumentary: No complaints of skin rash or skin lesions, no itching, no skin wound, no dry skin  Hematologic/Lymphatic: No complaints of swollen glands, no swollen glands in the neck, does not bleed easily, no easy bruising  Neurological: difficulty walking  ROS Reviewed:   ROS reviewed  Active Problems    1  Adjustment disorder with mixed anxiety and depressed mood (309 28) (F43 23)   2  Allergic rhinitis (477 9) (J30 9)   3  Arthralgia of right shoulder region (719 41) (M25 511)   4  Arthralgia Of The Right Pelvis/Hip/Femur (719 45)   5  Arthropathy of lumbar facet joint (721 3) (M47 816)   6  Atrial fibrillation (427 31) (I48 91)   7  Back pain (724 5) (M54 9)   8  Benign Localized Prostatic Hyperplasia Without Urinary Obstruction With Other Lower   Urinary Tract Symptoms (600 20)   9  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 01) (N40 1)   10  CAD (coronary artery disease) (414 00) (I25 10)   11  Carpal tunnel syndrome (354 0) (G56 00)   12  Compression fracture (829 0) (T14 8)   13  DDD (degenerative disc disease), lumbar (722 52) (M51 36)   14  Depression (311) (F32 9)   15  DJD (degenerative joint disease) of pelvis (715 95) (M16 9)   16  Dyspnea (786 09) (R06 00)   17  Dysuria (788 1) (R30 0)   18  Edema (782 3) (R60 9)   19  Esophageal reflux (530 81) (K21 9)   20  Fall (E888 9) (W19 XXXA)   21  Fracture of radius, distal, closed (813 42) (S52 509A)   22  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)   23  Hypertension (401 9) (I10)   24  Insomnia (780 52) (G47 00)   25  Lumbar canal stenosis (724 02) (M48 06)   26   Need for immunization against influenza (V04 81) (Z23) 27  Need for prophylactic vaccination and inoculation against influenza (V04 81) (Z23)   28  Nocturia (788 43) (R35 1)   29  Orthopedic Aftercare For Healing Traumatic Fx Lower Arm (V54 12)   30  Osteoarthritis of knee, unilateral (715 16) (M17 9)   31  Osteoarthrosis (715 90) (M19 90)   32  Other chronic pain (338 29) (G89 29)   33  Pain in joint of right hip (719 45) (M25 551)   34  Pressure ulcer, buttock (707 05,707 20) (L89 309)   35  Prostatitis (601 9) (N41 9)   36  Sick sinus syndrome (427 81) (I49 5)   37  Skin tear (879 8)   38  Sleep disorder (780 50) (G47 9)   39  Symptoms involving urinary system (788 99) (R39 9)   40  Thyroid nodule (241 0) (E04 1)   41  Tinea cruris (110 3) (B35 6)   42  Urgency of urination (788 63) (R39 15)   43  URI (upper respiratory infection) (465 9) (J06 9)   44  Urinary frequency (788 41) (R35 0)   45  UTI (urinary tract infection) (599 0) (N39 0)    Past Medical History    1  History of Acute deep vein thrombosis of lower limb, unspecified laterality   2  History of Arthritis (V13 4)   3  History of Depression (311) (F32 9)   4  History of gastroesophageal reflux (GERD) (V12 79) (Z87 19)   5  Need for prophylactic vaccination and inoculation against influenza (V04 81) (Z23)   6  History of Nephrolithiasis (V13 01)   7  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)   8  History of Pacemaker Evaluation Adjustment Of Cardiac Pacemaker   9  History of Tachycardia (785 0) (R00 0)   10  History of Venous insufficiency (chronic) (peripheral) (459 81) (I87 2)  Active Problems And Past Medical History Reviewed: The active problems and past medical history were reviewed and updated today  Surgical History    1  History of Appendectomy   2  History of Back Surgery   3  History of Cholecystectomy   4  History of Diagnostic Cystoscopy   5  History of Gallbladder Surgery   6  History of Hip Surgery   7  Orthopedic Aftercare For Healing Traumatic Fx Lower Arm (V54 12)   8   History of Pacemaker Placement   9  History of Pacemaker Placement  Surgical History Reviewed: The surgical history was reviewed and updated today  Family History    1  Family history of Heart Disease (V17 49)    2  Family history of Cancer   3  Family history of Reported Family History Of Cancer   4  Family history of Reported Family History Of Heart Disease   5  Family history of Stroke Syndrome (V17 1)  Family History Reviewed: The family history was reviewed and updated today  Social History    · Denied: History of Alcohol Use (History)   · Being A Social Drinker   · Denied: History of Current Smoker   · Denied: History of Drug Use   · Former smoker (V15 82) (U03 944)   · Never A Smoker   ·   Social History Reviewed: The social history was reviewed and updated today  The social history was reviewed and is unchanged  Current Meds   1  Centrum Silver Oral Tablet Recorded   2  Ciprofloxacin HCl - 500 MG Oral Tablet; TABS PO;   Therapy: 47KVJ0477-; Last Rx:75Ppv9763; Status: NEED INFORMATION - Problem   Ordered   3  [employer] Allergy Relief 180 MG Oral Tablet Recorded   4  [employer] Vitamin D CAPS Recorded   5  Famciclovir 125 MG Oral Tablet; Take 1 tablet daily; Therapy: 36FIR2793 to (Last Rx:05Oct2015)  Requested for: 05Oct2015 Ordered   6  Finasteride 5 MG Oral Tablet; TAKE 1 TABLET AT BEDTIME; Therapy: 56XPL7226 to (Last Rx:05Oct2015)  Requested for: 72JLA6448 Ordered   7  Flecainide Acetate 50 MG Oral Tablet; Take 1 tablet daily; Therapy: (Recorded:06Oct2015) to Recorded   8  Fluticasone Propionate 50 MCG/ACT Nasal Suspension; USE 1 SPRAY IN EACH   NOSTRIL TWICE DAILY; Therapy: 17HGR5776 to (Last Rx:22Nov2013)  Requested for: 22Nov2013 Ordered   9  Furosemide 20 MG Oral Tablet; take 1 tablet daily as needed; Therapy: 49Gnk2874 to (Last Rx:63Ieg7273)  Requested for: 47Jwi4404 Ordered   10  Gabapentin 100 MG Oral Capsule; TAKE 1 CAPSULE 3 TIMES DAILY;     Therapy: 32LXN6865 to (Evaluate:97Gbu5607) Requested for: 52WFR8711; Last    Rx:11Nov2015 Ordered   11  Galantamine Hydrobromide 4 MG Oral Tablet; Take 1 tablet daily; Therapy: 57BMP5811 to (Last Rx:29Oct2015)  Requested for: 29Oct2015 Ordered   12  Hydrocodone-Acetaminophen 5-325 MG Oral Tablet; TAKE 1 TABLET EVERY 4 TO 6    HOURS AS NEEDED FOR PAIN;    Therapy: 94YJQ6151 to (Evaluate:17Nov2014); Last Rx:78Eax8259 Ordered   13  Pantoprazole Sodium 40 MG Oral Tablet Delayed Release; Take 1 tablet daily; Therapy: 73USD6782 to (Last Rx:18Jan2016)  Requested for: 56OXU2785 Ordered   14  Potassium Chloride Krista ER 10 MEQ Oral Tablet Extended Release; Take one pill daily    in conjunction with furosemide only; Therapy: 54Msb5387 to (Last Rx:10Uuv7513)  Requested for: 68Eax8762 Ordered   15  Restasis 0 05 % Ophthalmic Emulsion; Therapy: 91Cgo4054 to (Tabitha Hines) Recorded   16  Rollator Miscellaneous; USE AS DIRECTED; Therapy: 60JVM4354 to (Last Rx:25Nov2015) Ordered   17  Tamsulosin HCl - 0 4 MG Oral Capsule; TAKE 1 CAPSULE AT BEDTIME; Therapy: 03ZQV1497 to (Last Rx:29Jan2015)  Requested for: 10XMG3924 Ordered   18  Trimethoprim 100 MG Oral Tablet; TAKE 1 TABLET EVERY 12 HOURS DAILY; Therapy: 26ASV0928 to (Evaluate:60Eag1643)  Requested for: 85GAY5759; Last    Rx:14Gnq1505 Ordered   19  VESIcare 5 MG Oral Tablet; One pill at bedtime; Therapy: 47TDK8912 to (Evaluate:37Cux9873)  Requested for: 25YVQ6864; Last    Rx:37Zkk6098 Ordered   20  Warfarin Sodium 2 MG Oral Tablet; Take 1 tablet daily as directed; Therapy: 49HFX9417 to (Evaluate:87Cpx7041)  Requested for: 54Gzx3502; Last    Rx:67Cga6976 Ordered   21  Warfarin Sodium 5 MG Oral Tablet; Take 1 tablet daily as directed; Therapy: 83UYC8710 to (Last [facility])  Requested for: 49Owt6481 Ordered   22  Zolpidem Tartrate 5 MG Oral Tablet; TAKE 1 TABLET AT BEDTIME AS NEEDED; Therapy: 28JNU3567 to (Evaluate:04Jun2016); Last Rx:22Aik8832 Ordered   23   Zolpidem Tartrate 5 MG Oral Tablet; TAKE 1 TABLET AT BEDTIME AS NEEDED; Therapy: 92SAM9812 to (Last Rx:28Rdo9785)  Requested for: 00OIO0754 Ordered  Medication List Reviewed: The medication list was reviewed and updated today  Allergies    1  CeleBREX CAPS   2  Sulfa Drugs    3  No Known Environmental Allergies   4  No Known Food Allergies    Vitals  Vital Signs [Data Includes: Current Encounter]    Recorded: 21VMS3966 02:20PM   Heart Rate 172   Systolic 189   Diastolic 70   Height 5 ft 6 in   Weight 186 lb 2 08 oz   BMI Calculated 30 04   BSA Calculated 1 95     Physical Exam    Constitutional   General appearance: No acute distress, well appearing and well nourished  Head and Face   Head and face: Normal     Eyes   Conjunctiva and lids: No erythema, swelling or discharge  Ears, Nose, Mouth, and Throat   Hearing: Abnormal   hearing aids  Pulmonary   Respiratory effort: No increased work of breathing or signs of respiratory distress  Cardiovascular   Examination of extremities for edema and/or varicosities: Abnormal     Abdomen   Abdomen: Non-tender, no masses  Musculoskeletal   Gait and station: Abnormal   sitting in a wheel chair  Skin   Skin and subcutaneous tissue: Normal without rashes or lesions  Neurologic   Cranial nerves: Cranial nerves 2-12 intact  Psychiatric   Judgment and insight: Normal     Orientation to person, place and time: Normal     Recent and remote memory: Intact      Mood and affect: Normal        Results/Data  Encounter Results   Urine Dip Non-Automated- POC 39OGC1024 02:22PM Adin Quintanilla     Test Name Result Flag Reference   Color Yellow     Clarity Transparent     Leukocytes -     Nitrite -     Blood -     Protein -     Ph 5 0     Specific Gravity 1 030     Ketone -     Glucose -         Future Appointments    Date/Time Provider Specialty Site   01/26/2016 02:45 PM Nicole Mercado DO Internal Medicine [facility] INTERNAL MED   02/11/2016 03:00 PM Nicole Mercado DO [facility] Medicine [facility] INTER[facility]   10/11/2016 01:00 PM Cardiology, [facility]   04/06/2016 01:30 PM Cardiology, Device Remote   D[address]   02/04/2016 01:00 PM RADHA Zazueta   Orthopedic Surgery [facility]     Signatures   Electronically signed by : Kaci Sullivan, [facility]; Jan 19 2016  2:37PM EST                       (Author)    Electronically signed by : RADHA Damico ; Feb 1 2016 10:35AM EST

## 2018-01-18 NOTE — PROGRESS NOTES
Chief Complaint  Patient presents for PVR for c/o increased nocturia and burning with urination  Active Problems    1  Adjustment disorder with mixed anxiety and depressed mood (309 28) (F43 23)   2  Allergic rhinitis (477 9) (J30 9)   3  Arthralgia of right shoulder region (719 41) (M25 511)   4  Arthralgia Of The Right Pelvis/Hip/Femur (719 45)   5  Arthropathy of lumbar facet joint (721 3) (M46 96)   6  Atrial fibrillation (427 31) (I48 91)   7  Back pain (724 5) (M54 9)   8  Benign Localized Prostatic Hyperplasia Without Urinary Obstruction With Other Lower   Urinary Tract Symptoms (600 20)   9  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 01,599 69) (N40 1,N13 8)   10  CAD (coronary artery disease) (414 00) (I25 10)   11  Carpal tunnel syndrome (354 0) (G56 00)   12  Compression fracture (829 0) (T14 8)   13  DDD (degenerative disc disease), lumbar (722 52) (M51 36)   14  Depression (311) (F32 9)   15  DJD (degenerative joint disease) of pelvis (715 95) (M16 9)   16  Dyspnea (786 09) (R06 00)   17  Dysuria (788 1) (R30 0)   18  Edema (782 3) (R60 9)   19  Esophageal reflux (530 81) (K21 9)   20  Fall (E888 9) (W19 XXXA)   21  Fracture of radius, distal, closed (813 42) (S52 509A)   22  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)   23  Hypertension (401 9) (I10)   24  Insomnia (780 52) (G47 00)   25  Lumbar canal stenosis (724 02) (M48 06)   26  Need for immunization against influenza (V04 81) (Z23)   27  Need for prophylactic vaccination and inoculation against influenza (V04 81) (Z23)   28  Nocturia (788 43) (R35 1)   29  Orthopedic Aftercare For Healing Traumatic Fx Lower Arm (V54 12)   30  Osteoarthritis of knee, unilateral (715 16) (M17 9)   31  Osteoarthrosis (715 90) (M19 90)   32  Other chronic pain (338 29) (G89 29)   33  Pain in joint of right hip (719 45) (M25 551)   34  Pressure ulcer, buttock (707 05,707 20) (L89 309)   35  Prostatitis (601 9) (N41 9)   36   Sick sinus syndrome (730 81) (I49 5)   37  Skin tear (879 8)   38  Sleep disorder (780 50) (G47 9)   39  Symptoms involving urinary system (788 99) (R39 9)   40  Thyroid nodule (241 0) (E04 1)   41  Tinea cruris (110 3) (B35 6)   42  Urgency of urination (788 63) (R39 15)   43  URI (upper respiratory infection) (465 9) (J06 9)   44  Urinary frequency (788 41) (R35 0)   45  UTI (urinary tract infection) (599 0) (N39 0)   46  Yeast infection of the skin (112 3) (B37 2)    Current Meds   1  Centrum Silver Oral Tablet Recorded   2  Ciprofloxacin HCl - 500 MG Oral Tablet; TABS PO;   Therapy: 07FCI9917-; Last Rx:02Hjl7898; Status: NEED INFORMATION - Problem   Ordered   3  CVS Allergy Relief 180 MG Oral Tablet Recorded   4  CVS Vitamin D CAPS Recorded   5  Famciclovir 125 MG Oral Tablet; Take 1 tablet daily; Therapy: 18LLV8786 to (Last Rx:05Oct2015)  Requested for: 05Oct2015 Ordered   6  Finasteride 5 MG Oral Tablet; TAKE 1 TABLET AT BEDTIME; Therapy: 42APQ8516 to (Last Rx:05Oct2015)  Requested for: 93FAO4836 Ordered   7  Flecainide Acetate 50 MG Oral Tablet; Take 1 tablet daily; Therapy: 93DQF6785 to (Last Rx:73Qjh3572)  Requested for: 89MIX3878 Ordered   8  Fluticasone Propionate 50 MCG/ACT Nasal Suspension; USE 1 SPRAY IN EACH   NOSTRIL TWICE DAILY; Therapy: 21PSO3017 to (Last Rx:22Nov2013)  Requested for: 22Nov2013 Ordered   9  Furosemide 20 MG Oral Tablet; take 1 tablet daily as needed; Therapy: 02Glk7051 to (Last Rx:90Tem0238)  Requested for: 42Owr7852 Ordered   10  Gabapentin 100 MG Oral Capsule; take 1 capsule three times a day; Therapy: 53WBA8363 to (Last Rx:97Jks6282)  Requested for: 24JJD6266 Ordered   11  Galantamine Hydrobromide 4 MG Oral Tablet; Take 1 tablet daily; Therapy: 16UZU9084 to (Last Rx:29Oct2015)  Requested for: 29Oct2015 Ordered   12  Hydrocodone-Acetaminophen 5-325 MG Oral Tablet; TAKE 1 TABLET EVERY 4 TO 6    HOURS AS NEEDED FOR PAIN;    Therapy: 02SXL1783 to (Evaluate:17Nov2014);  Last Rx:91Nzk4589 Ordered 13  Nystatin 759319 UNIT/GM External Powder; APPLY SPARINGLY TO AFFECTED    AREA(S) TWICE DAILY; Therapy: 33VUP7656 to (Last FLORES:16KCZ4290)  Requested for: 90GKK5444 Ordered   14  Pantoprazole Sodium 40 MG Oral Tablet Delayed Release; Take 1 tablet daily; Therapy: 52AQJ2372 to (Last Rx:18Jan2016)  Requested for: 80VMP8748 Ordered   15  Potassium Chloride Krista ER 10 MEQ Oral Tablet Extended Release; Take one pill daily    in conjunction with furosemide only; Therapy: 16Fcw9601 to (Last Rx:67Dqm1686)  Requested for: 59Uzz6319 Ordered   16  Restasis 0 05 % Ophthalmic Emulsion; Therapy: 04Tpw1632 to (Asher Ingram) Recorded   17  Rollator Miscellaneous; USE AS DIRECTED; Therapy: 79FNV0888 to (Last Rx:25Nov2015) Ordered   18  Tamsulosin HCl - 0 4 MG Oral Capsule; TAKE 1 CAPSULE AT BEDTIME; Therapy: 83ERB4038 to (Last Rx:94Wso8864)  Requested for: 10DRQ2502 Ordered   19  Trimethoprim 100 MG Oral Tablet; TAKE 1 TABLET EVERY 12 HOURS DAILY; Therapy: 88QFM9239 to (Evaluate:32Bgi7688)  Requested for: 51NQQ8678; Last    Rx:13Cow3360 Ordered   20  VESIcare 5 MG Oral Tablet; One pill at bedtime; Therapy: 90HJN1985 to (Evaluate:64Lgh9320)  Requested for: 56Jut5421; Last    Rx:61Obs2540 Ordered   21  Warfarin Sodium 2 MG Oral Tablet; Take 1 tablet daily as directed; Therapy: 81WOF1679 to (Evaluate:80Oas4311)  Requested for: 84Mjd9333; Last    Rx:94Zce2171 Ordered   22  Warfarin Sodium 5 MG Oral Tablet; Take 1 tablet daily as directed; Therapy: 50LBU7630 to (Last Suhail Minus)  Requested for: 36Efd1198 Ordered   23  Zolpidem Tartrate 5 MG Oral Tablet; TAKE ONE TABLET BY MOUTH AT BEDTIME AS    NEEDED FOR SLEEP; Therapy: 89IRC9281 to (Evaluate:12Iof1859); Last Rx:23Jun2016 Ordered    Allergies    1  CeleBREX CAPS   2  Sulfa Drugs    3  No Known Environmental Allergies   4   No Known Food Allergies    Vitals  Signs    Systolic: 793  Diastolic: 68  Heart Rate: 82  Height: 5 ft 7 in  Weight: 185 lb BMI Calculated: 28 98  BSA Calculated: 1 96    Results/Data  Urine Dip Non-Automated- POC 46Haw9759 02:27PM Darybishnu Marteas     Test Name Result Flag Reference   Color Yellow     Clarity Transparent     Leukocytes neg     Nitrite neg     Blood neg     Bilirubin neg     Protein neg     Ph 6 0     Specific Gravity 1 015     Ketone neg     Glucose neg         Procedure    Procedure:   Equipment And Procedure: The patient voided 20 ml ml  U/S Findings: PVR 66 97 ml  Assessment    1  Dysuria (788 1) (R30 0)   2  Nocturia (788 43) (R35 1)   3  Urgency of urination (788 63) (R39 15)    Plan  Dysuria, Nocturia    · Phenazopyridine HCl - 200 MG Oral Tablet; take one tablet three times per day as  needed for urinary frequency and burning   Rx By: Dary Suarez; Dispense: 0 Days ; #:15 Tablet; Refill: 1; For: Dysuria, Nocturia; JUDSON = N; Verified Transmission to [facility]/PHARMACY# [id number]    Discussion/Summary    Reviewed with CHANDRIKA Floyd , patient instructed to start Pyridium 200 mg TID PRN, await final urine culture results and start Miralax 1 capful daily in addition to current stool softener as patient reports not completely emptying his bowels, patient to limit fluids 2 hours prior to bed  Patient will call office on Monday for culture results  Future Appointments    Date/Time Provider Specialty Site   10/11/2016 01:00 PM Cardiology, [address] [facility]   08/18/2016 11:00 AM Cardiology, Device Remote  [address] [place] [address]   10/06/2016 10:00 AM RADHA Richardson  Cardiology [facility] CARDIOLOGY [place]   01/24/2017 01:00 PM Dary Suarez [facility] Urology  [facility]     Signatures   Electronically signed by : Naida Hoyos [facility];  Aug 12 2016  2:58PM EST                       (Author)    Electronically signed by : Jennifer Simon, ; Aug 12 2016  3:47PM EST                       (Author)    Electronically signed by : RADHA Garcia ; Aug 21 2016 8:48PM EST

## 2018-01-22 VITALS
RESPIRATION RATE: 16 BRPM | HEART RATE: 93 BPM | SYSTOLIC BLOOD PRESSURE: 134 MMHG | OXYGEN SATURATION: 95 % | DIASTOLIC BLOOD PRESSURE: 84 MMHG | TEMPERATURE: 97.7 F

## 2018-01-22 VITALS
HEART RATE: 94 BPM | TEMPERATURE: 97.8 F | SYSTOLIC BLOOD PRESSURE: 122 MMHG | RESPIRATION RATE: 12 BRPM | OXYGEN SATURATION: 95 % | DIASTOLIC BLOOD PRESSURE: 72 MMHG

## 2018-01-22 VITALS
HEART RATE: 83 BPM | OXYGEN SATURATION: 94 % | DIASTOLIC BLOOD PRESSURE: 78 MMHG | TEMPERATURE: 97.4 F | SYSTOLIC BLOOD PRESSURE: 120 MMHG | RESPIRATION RATE: 16 BRPM

## 2018-01-23 LAB — INR PPP: 1.6 (ref 0.86–1.16)

## 2018-01-23 NOTE — MISCELLANEOUS
History of Present Illness  TCM Communication [facility]: The patient is being contacted for follow-up after hospitalization  He was hospitalized at [facility]  The date of admission: 12/4/17, date of discharge: 12/8/17  Diagnosis: Community acquired Pneumonia  He was discharged to home  He did not schedule a follow up appointment  Communication performed and completed by Ysabel Miller   HPI: Patient discharged to short term rehab at [address] [place] [address]    1  Acid indigestion (536 8) (K30)   2  Acute diastolic congestive heart failure (428 31,428 0) (I50 31)   3  Acute UTI (599 0) (N39 0)   4  Adjustment disorder with mixed anxiety and depressed mood (309 28) (F43 23)   5  Allergic rhinitis (477 9) (J30 9)   6  At risk for injury related to fall (V49 89) (Z91 89)   7  Atrial fibrillation (427 31) (I48 91)   8  Back pain (724 5) (M54 9)   9  Benign Localized Prostatic Hyperplasia Without Urinary Obstruction With Other Lower   Urinary Tract Symptoms (600 20)   10  Bladder incontinence (788 30) (R32)   11  CAD (coronary artery disease) (414 00) (I25 10)   12  Carpal tunnel syndrome (354 0) (G56 00)   13  Cellulitis of right lower extremity (682 6) (L03 115)   14  Chronic low back pain (724 2,338 29) (M54 5,G89 29)   15  Chronic pain syndrome (338 4) (G89 4)   16  Constipation, acute (564 00) (K59 00)   17  DDD (degenerative disc disease), lumbar (722 52) (M51 36)   18  Depression (311) (F32 9)   19  DJD (degenerative joint disease) of pelvis (715 95) (M16 10)   20  Dyspnea (786 09) (R06 00)   21  Dysuria (788 1) (R30 0)   22  Edema (782 3) (R60 9)   23  Esophageal reflux (530 81) (K21 9)   24  Fall (E888 9) (W19 XXXA)   25  Fatigue (780 79) (R53 83)   26  Foreskin inflammation (607 2) (N48 29)   27  Foreskin problem (607 89) (N47 8)   28  Greater trochanteric bursitis, right (726 5) (M70 61)   29  Heart block (426 9) (I45 9)   30  Hypertension (401 9) (I10)   31   Insomnia (780 52) (G47 00)   32  Lumbar canal stenosis (724 02) (M48 061)   33  Lumbar radiculopathy (724 4) (M54 16)   34  History of Need for prophylactic vaccination and inoculation against influenza (V04 81)    (Z23)   35  Nocturia (788 43) (R35 1)   36  Orthopedic Aftercare For Healing Traumatic Fx Lower Arm (V54 12)   37  Osteoarthritis of knee, unilateral (715 16) (M17 10)   38  Osteoarthrosis (715 90) (M19 90)   39  Other acute gastritis without hemorrhage (535 00) (K29 00)   40  Other chronic pain (338 29) (G89 29)   41  Pain in joint of right hip (719 45) (M25 551)   42  Physical deconditioning (799 3) (R53 81)   43  Pressure ulcer, buttock (707 05,707 20) (L89 309)   44  Prostatitis (601 9) (N41 9)   45  Renal insufficiency (593 9) (N28 9)   46  Right hip pain (719 45) (M25 551)   47  Sick sinus syndrome (427 81) (I49 5)   48  Skin tear (879 8)   49  Sleep disorder (780 50) (G47 9)   50  Symptoms involving urinary system (788 99) (R39 9)   51  Thyroid nodule (241 0) (E04 1)   52  Tinea cruris (110 3) (B35 6)   53  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)    (C44 209)   54  Unsteady gait (781 2) (R26 81)   55  Urgency of urination (788 63) (R39 15)   56  URI (upper respiratory infection) (465 9) (J06 9)   57  Urinary frequency (788 41) (R35 0)   58  UTI (urinary tract infection) (599 0) (N39 0)   59  Viral infection (079 99) (B34 9)   60  Yeast infection of the skin (112 3) (B37 2)    Past Medical History    1  History of Acute deep vein thrombosis of lower limb, unspecified laterality   2  History of Arthralgia of right shoulder region (719 41) (M25 511)   3  History of Arthralgia Of The Right Pelvis/Hip/Femur (719 45)   4  History of Arthritis (V13 4)   5  History of Arthropathy of lumbar facet joint (721 3) (M46 96)   6  History of 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 01,599 69)   (N40 1,N13 8)   7  History of Compression fracture (829 0)   8   History of Depression (311) (F32 9)   9  History of Fracture of radius, distal, closed (813 42) (S52 509A)   10  History of acute sinusitis (V12 69) (Z87 09)   11  History of bronchitis (V12 69) (Z87 09)   12  History of gastroesophageal reflux (GERD) (V12 79) (Z87 19)   13  History of herpes simplex infection (V12 09) (Z86 19)   14  History of viral infection (V12 09) (Z86 19)   15  History of Need for immunization against influenza (V04 81) (Z23)   16  History of Need for prophylactic vaccination and inoculation against influenza (V04 81)    (Z23)   17  History of Nephrolithiasis (V13 01)   18  History of Organic impotence (607 84) (N52 9)   19  History of Pacemaker Evaluation Adjustment Of Cardiac Pacemaker   20  History of Tachycardia (785 0) (R00 0)   21  History of Venous insufficiency (chronic) (peripheral) (459 81) (I87 2)    Surgical History    1  History of Appendectomy   2  History of Back Surgery   3  History of Cholecystectomy   4  History of Diagnostic Cystoscopy   5  History of Gallbladder Surgery   6  History of Hip Surgery   7  Orthopedic Aftercare For Healing Traumatic Fx Lower Arm (V54 12)   8  History of Pacemaker Placement   9  History of Pacemaker Placement    Family History  Brother    1  Family history of Heart Disease (V17 49)  Family History    2  Family history of Cancer   3  Family history of Reported Family History Of Cancer   4  Family history of Reported Family History Of Heart Disease   5  Family history of Stroke Syndrome (V17 1)    Social History    · Denied: History of Alcohol Use (History)   · Being A Social Drinker   · Denied: History of Current Smoker   · Denied: History of Drug Use   · Former smoker (V15 82) (D91 987)   · Never A Smoker   ·     Current Meds   1  Aspercreme LOTN;   Therapy: (Recorded:54Itv7147) to Recorded   2  Centrum Silver Oral Tablet Recorded   3  Cephalexin 500 MG Oral Capsule; TAKE 1 CAPSULE 4 TIMES DAILY;    Therapy: 89HWP2898 to (Evaluate:18Nov2017) Requested for: 43CFO1086; Last   Rx:08Nov2017 Ordered   4  [employer]S Allergy Relief 180 MG Oral Tablet Recorded   5  [employer]S Vitamin D CAPS Recorded   6  Famciclovir 125 MG Oral Tablet; Take 1 tablet daily; Therapy: 68JZH9212 to (Evaluate:28Apr2018)  Requested for: 75Jqf7932; Last   Rx:29Icd4551 Ordered   7  Finasteride 5 MG Oral Tablet; TAKE 1 TABLET AT BEDTIME; Therapy: 90GYR7259 to (Last Rx:19Jan2017)  Requested for: 20Jan2017 Ordered   8  Flecainide Acetate 50 MG Oral Tablet; Take 1 tablet daily; Therapy: 87ZGW7002 to (Evaluate:12Oct2017)  Requested for: 72UJC7586; Last   Rx:17Oct2016 Ordered   9  Fluticasone Propionate 50 MCG/ACT Nasal Suspension; USE 1 SPRAY IN EACH   NOSTRIL TWICE DAILY; Therapy: 09ZFX8883 to (Last Rx:02Aug2017)  Requested for: 02Aug2017 Ordered   10  Furosemide 40 MG Oral Tablet; Take 1 tablet daily; Therapy: 58AAT3232 to (Evaluate:06Jun2018)  Requested for: 01TFE5804; Last    Rx:08Nov2017 Ordered   11  Galantamine Hydrobromide 4 MG Oral Tablet; Take 1 tablet daily; [facility]SFJ0334 [facility]  Requested for: 53OFX3737; Last    Rx:27Jan2017 Ordered   12  Hydrocodone-Acetaminophen 5-325 MG Oral Tablet; TAKE 1 TABLET EVERY 4 TO 6    HOURS AS NEEDED; Therapy: (Recorded:07Jun2017) to Recorded   13  MiraLax Oral Powder; MIX 17 GRAMS IN 8 OUNCES OF WATER AND DRINK ONCE    DAILY  As needed for constipation; Therapy: 31ROI5618 to (Alton Ngo)  Requested for: 51SHQ2998; Last    Rx:17Zut8501 Ordered   14  Oseltamivir Phosphate 75 MG Oral Capsule; TAKE 1 CAPSULE TWICE DAILY; Therapy: 88WRZ8767 to (Evaluate:06Dec2017)  Requested for: 94ZKG9275; Last    Rx:01Dec2017 Ordered   15  Pantoprazole Sodium 40 MG Oral Tablet Delayed Release; Take 1 tablet twice daily; Therapy: 55XHQ4979 to (ASKESLXP:57IWY8376)  Requested for: 24NMN3871; Last    Rx:18Jan2017 Ordered   16  Potassium Chloride ER 10 MEQ Oral Capsule Extended Release; take 1 capsule daily;     Therapy: 35ZNS0322 to (Rekha Andersen)  Requested for: 61JCQ2374; Last    Rx:08Nov2017 Ordered   17  Restasis 0 05 % Ophthalmic Emulsion; Therapy: 20Msv8154 to (Shereen Arroyo) Recorded   18  Tamsulosin HCl - 0 4 MG Oral Capsule; TAKE 1 CAPSULE AT BEDTIME; Therapy: 76JWK8958 to (Last Rx:19Jan2017)  Requested for: 20Jan2017 Ordered   19  TraZODone HCl - 50 MG Oral Tablet; Take 1 tablet by mouth at bedtime; Therapy: 56YYT8511 to 9397 8822)  Requested for: 39NYW7490; Last    Rx:27Eim9949 Ordered   20  VESIcare 5 MG Oral Tablet; One pill at bedtime; Therapy: 35IMX5349 to (Evaluate:14Jan2018)  Requested for: 86HOR6814; Last    Rx:19Jan2017 Ordered   21  Warfarin Sodium 2 MG Oral Tablet; Take 1 tablet daily as directed; Therapy: 93KJE5933 to (Evaluate:00Gys3067)  Requested for: 39Vsj9180; Last    Rx:74Lor0619 Ordered   22  Warfarin Sodium 5 MG Oral Tablet; Take 1 tablet daily as directed; Therapy: 54JEE5493 to (Last Rx:78Emo9277)  Requested for: 33Ppc6446 Ordered   23  Zolpidem Tartrate 5 MG Oral Tablet; TAKE 1 TABLET AT BEDTIME AS NEEDED FOR    SLEEP; Therapy: 43EHW4644 to (Evaluate:74Iel5310); Last Rx:92Nle7372 Ordered    Allergies    1  CeleBREX CAPS   2  Sulfa Drugs    3  No Known Environmental Allergies   4  No Known Food Allergies    Future Appointments    Date/Time Provider Specialty Site   12/04/2018 03:00 PM Cardiology, [address] [facility]   03/05/2018 01:00 PM Cardiology, Device Remote  [facility] [facility]   02/22/2018 11:30 AM Manjula Yost DO Internal Medicine [facility] INTERNAL MED     Signatures   Electronically signed by :  Kerri Heimlich, ; Dec 13 2017  1:38PM EST                       (Author)    Electronically signed by : RADHA Yeung ; Dec 15 2017  4:49PM EST                       (Author)

## 2018-01-23 NOTE — PROGRESS NOTES
Assessment    1  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 01,599 69) (N40 1,N13 8)   2  Nocturia (788 43) (R35 1)   3  Sleep disorder (780 50) (G47 9)    Plan  Nocturia    · Urine Dip Non-Automated- POC; Status:Complete - Retrospective By Protocol  Authorization;   Done: 53IJV9376 01:48PM   Performed: In Office; Due:71Hqu5278; Last Updated By:Karen Vargas; 8/30/2016 1:49:37 PM;Ordered;  For:Nocturia; Ordered By:Venice Romero; Discussion/Summary  Discussion Summary:   Hermelindo Grant is a 80year old gentleman with urgency and nocturia managed by Dr Jennifer Holt  We discussed continuing the Vesicare  Patient has noted worsening nocturia  He has been trying to decrease his evening fluids but this is not effective  He will continue the Tamsulosin and Finasteride  We discussed he needs to use the CPAP machine as this is the likely cause of his worsening nocturia since he voids small amounts overnight  Patient states he hasn't had a new machine in some time  We discussed contacting his PCP to work on obtaining a new machine which he is able to use  Follow up in 2 months  Counseling Documentation With Imm: The patient, patient's caretaker was counseled regarding  total time of encounter was 25 minutes and 20 minutes was spent counseling  Chief Complaint  Chief Complaint Free Text Note Form: Nocturia, Frequency, Discuss Meds      History of Present Illness  HPI: Hermelindo Grant is a 80year old gentleman who presents for follow up on his BPH and nocturnal enuresis  Continues with Vesicare, Finasteride and Tamsulosin  Has nocturia 4-5 with incontinence  Admits to drinking through the night  Denies any hematuria or dysuria  Stream is fair  No interval health changes  Does have limited mobility  Has been on increased Lasix for leg edema  Myrbetriq caused him severe side effects  Has not been using his CPAP machine for some time        Review of Systems  Complete-Male Urology:   Constitutional: No fever or chills, feels well, no tiredness, no recent weight gain or weight loss  Respiratory: No complaints of shortness of breath, no wheezing, no cough, no SOB on exertion, no orthopnea or PND  Cardiovascular: No complaints of slow heart rate, no fast heart rate, no chest pain, no palpitations, no leg claudication, no lower extremity  Gastrointestinal: No complaints of abdominal pain, no constipation, no nausea or vomiting, no diarrhea or bloody stools  Genitourinary: urinary hesitancy, Empty sensation (not always ), incontinence, feelings of urinary urgency, stream quality fair, urinary stream starts and stops, but no dysuria and no hematuria    The patient presents with complaints of nocturia (4-5 times a night )  Musculoskeletal: No complaints of arthralgia, no myalgias, no joint swelling or stiffness, no limb pain or swelling  Integumentary: No complaints of skin rash or skin lesions, no itching, no skin wound, no dry skin  Hematologic/Lymphatic: No complaints of swollen glands, no swollen glands in the neck, does not bleed easily, no easy bruising  Neurological: No compliants of headache, no confusion, no convulsions, no numbness or tingling, no dizziness or fainting, no limb weakness, no difficulty walking  ROS Reviewed:   ROS reviewed  Active Problems    1  Adjustment disorder with mixed anxiety and depressed mood (309 28) (F43 23)   2  Allergic rhinitis (477 9) (J30 9)   3  Arthralgia of right shoulder region (719 41) (M25 511)   4  Arthralgia Of The Right Pelvis/Hip/Femur (719 45)   5  Arthropathy of lumbar facet joint (721 3) (M46 96)   6  Atrial fibrillation (427 31) (I48 91)   7  Back pain (724 5) (M54 9)   8  Benign Localized Prostatic Hyperplasia Without Urinary Obstruction With Other Lower   Urinary Tract Symptoms (600 20)   9  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 01,599 69) (N40 1,N13 8)   10  CAD (coronary artery disease) (414 00) (I25 10)   11   Carpal tunnel syndrome (354 0) (G56 00)   12  Compression fracture (829 0) (T14 8)   13  DDD (degenerative disc disease), lumbar (722 52) (M51 36)   14  Depression (311) (F32 9)   15  DJD (degenerative joint disease) of pelvis (715 95) (M16 9)   16  Dyspnea (786 09) (R06 00)   17  Dysuria (788 1) (R30 0)   18  Edema (782 3) (R60 9)   19  Esophageal reflux (530 81) (K21 9)   20  Fall (E888 9) (W19 XXXA)   21  Fracture of radius, distal, closed (813 42) (S52 509A)   22  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)   23  Hypertension (401 9) (I10)   24  Insomnia (780 52) (G47 00)   25  Lumbar canal stenosis (724 02) (M48 06)   26  Need for immunization against influenza (V04 81) (Z23)   27  Need for prophylactic vaccination and inoculation against influenza (V04 81) (Z23)   28  Nocturia (788 43) (R35 1)   29  Orthopedic Aftercare For Healing Traumatic Fx Lower Arm (V54 12)   30  Osteoarthritis of knee, unilateral (715 16) (M17 9)   31  Osteoarthrosis (715 90) (M19 90)   32  Other chronic pain (338 29) (G89 29)   33  Pain in joint of right hip (719 45) (M25 551)   34  Pressure ulcer, buttock (707 05,707 20) (L89 309)   35  Prostatitis (601 9) (N41 9)   36  Sick sinus syndrome (427 81) (I49 5)   37  Skin tear (879 8)   38  Sleep disorder (780 50) (G47 9)   39  Symptoms involving urinary system (788 99) (R39 9)   40  Thyroid nodule (241 0) (E04 1)   41  Tinea cruris (110 3) (B35 6)   42  Urgency of urination (788 63) (R39 15)   43  URI (upper respiratory infection) (465 9) (J06 9)   44  Urinary frequency (788 41) (R35 0)   45  UTI (urinary tract infection) (599 0) (N39 0)   46  Yeast infection of the skin (112 3) (B37 2)    Past Medical History    1  History of Acute deep vein thrombosis of lower limb, unspecified laterality   2  History of Arthritis (V13 4)   3  History of Depression (311) (F32 9)   4  History of gastroesophageal reflux (GERD) (V12 79) (Z87 19)   5  Need for prophylactic vaccination and inoculation against influenza (V04 81) (Z23)   6  History of Nephrolithiasis (V13 01)   7  History of Organic impotence (607 84) (N52 9)   8  History of Pacemaker Evaluation Adjustment Of Cardiac Pacemaker   9  History of Tachycardia (785 0) (R00 0)   10  History of Venous insufficiency (chronic) (peripheral) (459 81) (I87 2)  Active Problems And Past Medical History Reviewed: The active problems and past medical history were reviewed and updated today  Surgical History    1  History of Appendectomy   2  History of Back Surgery   3  History of Cholecystectomy   4  History of Diagnostic Cystoscopy   5  History of Gallbladder Surgery   6  History of Hip Surgery   7  Orthopedic Aftercare For Healing Traumatic Fx Lower Arm (V54 12)   8  History of Pacemaker Placement   9  History of Pacemaker Placement  Surgical History Reviewed: The surgical history was reviewed and updated today  Family History  Brother    1  Family history of Heart Disease (V17 49)  Family History    2  Family history of Cancer   3  Family history of Reported Family History Of Cancer   4  Family history of Reported Family History Of Heart Disease   5  Family history of Stroke Syndrome (V17 1)  Family History Reviewed: The family history was reviewed and updated today  Social History    · Denied: History of Alcohol Use (History)   · Being A Social Drinker   · Denied: History of Current Smoker   · Denied: History of Drug Use   · Former smoker (V15 82) (K70 726)   · Never A Smoker   ·   Social History Reviewed: The social history was reviewed and updated today  The social history was reviewed and is unchanged  Current Meds   1  Centrum Silver Oral Tablet Recorded   2  Ciprofloxacin HCl - 500 MG Oral Tablet; TABS PO;   Therapy: 11ZJR4879-; Last Rx:05Mat2035; Status: NEED INFORMATION - Problem   Ordered   3  CVS Allergy Relief 180 MG Oral Tablet Recorded   4  CVS Vitamin D CAPS Recorded   5  Famciclovir 125 MG Oral Tablet; Take 1 tablet daily;    Therapy: 85CEE2910 to (Last Rolena Proffer)  Requested for: 28Dfn9075 Ordered   6  Finasteride 5 MG Oral Tablet; TAKE 1 TABLET AT BEDTIME; Therapy: 55TTA8472 to (Last Rolena Proffer)  Requested for: 18Pmh1667 Ordered   7  Flecainide Acetate 50 MG Oral Tablet; Take 1 tablet daily; Therapy: 13WFS5344 to (Last Rx:45Kug3817)  Requested for: 58CZK2818 Ordered   8  Fluticasone Propionate 50 MCG/ACT Nasal Suspension; USE 1 SPRAY IN EACH   NOSTRIL TWICE DAILY; Therapy: 47XTL3567 to (Last Rx:22Nov2013)  Requested for: 22Nov2013 Ordered   9  Furosemide 20 MG Oral Tablet; take 1 tablet daily as needed; Therapy: 76Bid2003 to (Last Rx:40Lmk2436)  Requested for: 40Vfv7514 Ordered   10  Gabapentin 100 MG Oral Capsule; take 1 capsule three times a day; Therapy: 29DBX9201 to (Last Rx:09Shc0947)  Requested for: 73UGS3727 Ordered   11  Galantamine Hydrobromide 4 MG Oral Tablet; Take 1 tablet daily; Therapy: 63YAN9243 to (Last Rx:29Oct2015)  Requested for: 52Axr8911 Ordered   12  Hydrocodone-Acetaminophen 5-325 MG Oral Tablet; TAKE 1 TABLET EVERY 4 TO 6    HOURS AS NEEDED FOR PAIN;    Therapy: 90GJU5855 to (Evaluate:17Nov2014); Last Rx:10Bcx9364 Ordered   13  Nystatin 263524 UNIT/GM External Powder; APPLY SPARINGLY TO AFFECTED AREA(S)    TWICE DAILY; Therapy: 69RLA6628 to (Last KK:29TJF3303)  Requested for: 06LUX9597 Ordered   14  Pantoprazole Sodium 40 MG Oral Tablet Delayed Release; Take 1 tablet daily; Therapy: 71DPD1088 to (Last Rx:18Jan2016)  Requested for: 37YLY0283 Ordered   15  Phenazopyridine HCl - 200 MG Oral Tablet; take one tablet three times per day as    needed for urinary frequency and burning; Therapy: 64Oyc6531 to (Last Rx:70Lay6453)  Requested for: 24Asw4716 Ordered   16  Potassium Chloride Krista ER 10 MEQ Oral Tablet Extended Release; Take one pill daily    in conjunction with furosemide only; Therapy: 02Qsm4257 to (Last Rx:42Zva1644)  Requested for: 92Poz5277 Ordered   17   Restasis 0 05 % Ophthalmic Emulsion; Therapy: 57Uny0688 to (Sixto Charles) Recorded   18  Rollator Miscellaneous; USE AS DIRECTED; Therapy: 46WWM4044 to (Last Rx:33Ljy5697) Ordered   19  Tamsulosin HCl - 0 4 MG Oral Capsule; TAKE 1 CAPSULE AT BEDTIME; Therapy: 27VWD8023 to (Last Rx:26Nka8140)  Requested for: 01VGW9478 Ordered   20  Trimethoprim 100 MG Oral Tablet; TAKE 1 TABLET EVERY 12 HOURS DAILY; Therapy: 80TIY6156 to (Evaluate:06Qqx2269)  Requested for: 12WCP8444; Last    Rx:91Xgj3714 Ordered   21  VESIcare 5 MG Oral Tablet; One pill at bedtime; Therapy: 22YXT1965 to (Evaluate:82Bwb1270)  Requested for: 12Lsl5093; Last    Rx:38Fcn1251 Ordered   22  Warfarin Sodium 2 MG Oral Tablet; Take 1 tablet daily as directed; Therapy: 79WPP3236 to (Evaluate:34Kse1157)  Requested for: 38Cth3128; Last    Rx:48Iui9030 Ordered   23  Warfarin Sodium 5 MG Oral Tablet; Take 1 tablet daily as directed; Therapy: 55NQG2853 to (Last Yonis Essex)  Requested for: 05Sqd0985 Ordered   24  Zolpidem Tartrate 5 MG Oral Tablet; TAKE ONE TABLET BY MOUTH AT BEDTIME AS    NEEDED FOR SLEEP; Therapy: 84PRC7766 to (Evaluate:61Foc2042); Last SV:49EDB9406 Ordered  Medication List Reviewed: The medication list was reviewed and updated today  Allergies    1  CeleBREX CAPS   2  Sulfa Drugs    3  No Known Environmental Allergies   4  No Known Food Allergies    Vitals  Vital Signs    Recorded: 13MVP6343 95:27OL   Systolic 980   Diastolic 64   Heart Rate 86   Height 5 ft 8 in   Weight 186 lb    BMI Calculated 28 28   BSA Calculated 1 98     Physical Exam    Constitutional   General appearance: No acute distress, well appearing and well nourished  Head and Face   Head and face: Normal     Eyes   Conjunctiva and lids: No erythema, swelling or discharge  Ears, Nose, Mouth, and Throat   Hearing: Abnormal   hearing aids  Pulmonary   Respiratory effort: No increased work of breathing or signs of respiratory distress      Cardiovascular   Examination of extremities for edema and/or varicosities: Abnormal     Abdomen   Abdomen: Non-tender, no masses  Musculoskeletal   Gait and station: Abnormal   sitting in a wheel chair  Skin   Skin and subcutaneous tissue: Normal without rashes or lesions  Neurologic   Cranial nerves: Cranial nerves 2-12 intact  Psychiatric   Judgment and insight: Normal     Orientation to person, place and time: Normal     Recent and remote memory: Intact  Mood and affect: Normal        Results/Data  (1) PT WITH INR 93Sfs4116 02:18PM Bhavna Fuentes Order Number: [id number]   Order Number: [id number]     Test Name Result Flag Reference   INR 2 46 H 0 86-1 16   Performing Comments: Q WEEKLY FOR THE NEXT 6 MONTHS    Performing Comments: Q WEEKLY FOR THE NEXT 6 MONTHS      Performing Comments: Q WEEKLY FOR THE NEXT 6 MONTHSPerforming Comments: Q WEEKLY FOR THE NEXT 6 MONTHS   PT 26 3 seconds H 12 0-14 3       Procedure    Procedure: Bladder Ultrasound Post Void Residual    Test indication: nocturia  Equipment And Procedure: The patient voided  U/S Findings: 97 ml  Future Appointments    Date/Time Provider Specialty Site   10/11/2016 01:00 PM Cardiology, [address] [facility]   10/25/2016 01:30 PM Jordan Lovelace, [address]  Urology [facility] FOR UROLOGY [facility]   10/06/2016 10:00 AM RADHA Lofton  Cardiology [facility] CARDIOLOGY [place]   01/24/2017 01:00 PM Mora Carbajal [facility] Urology  [facility]     Signatures   Electronically signed by : Joshua Lares [facility]; Aug 30 2016  2:16PM EST                       (Author)    Electronically signed by : Joshua Lares [facility];  Aug 30 2016  2:17PM EST                       (Author)    Electronically signed by : RADHA Stuart ; Sep  2 2016  3:43PM EST

## 2018-01-24 ENCOUNTER — PATIENT OUTREACH (OUTPATIENT)
Dept: INTERNAL MEDICINE CLINIC | Facility: CLINIC | Age: 83
End: 2018-01-24

## 2018-01-24 NOTE — PROGRESS NOTES
Outreach Tc to facility [facility]) and spoke with nursing supervisor  Patient is adjusting well  Patient is receiving ADL assist and medication dosing  Patient is able to transfer to a wheelchair and go to dining room  patient appears to be adjusting well

## 2018-01-26 ENCOUNTER — TRANSITIONAL CARE MANAGEMENT (OUTPATIENT)
Dept: INTERNAL MEDICINE CLINIC | Facility: CLINIC | Age: 83
End: 2018-01-26

## 2018-01-30 ENCOUNTER — ANTICOAG VISIT (OUTPATIENT)
Dept: INTERNAL MEDICINE CLINIC | Facility: CLINIC | Age: 83
End: 2018-01-30

## 2018-01-30 LAB
INR PPP: 2.6 (ref 0.86–1.16)
INR PPP: 2.6 (ref 0.86–1.16)

## 2018-01-31 ENCOUNTER — OFFICE VISIT (OUTPATIENT)
Dept: INTERNAL MEDICINE CLINIC | Facility: CLINIC | Age: 83
End: 2018-01-31
Payer: MEDICARE

## 2018-01-31 ENCOUNTER — ANTICOAG VISIT (OUTPATIENT)
Dept: INTERNAL MEDICINE CLINIC | Facility: CLINIC | Age: 83
End: 2018-01-31

## 2018-01-31 ENCOUNTER — PATIENT OUTREACH (OUTPATIENT)
Dept: INTERNAL MEDICINE CLINIC | Facility: CLINIC | Age: 83
End: 2018-01-31

## 2018-01-31 VITALS
HEART RATE: 71 BPM | DIASTOLIC BLOOD PRESSURE: 76 MMHG | OXYGEN SATURATION: 97 % | TEMPERATURE: 96.2 F | SYSTOLIC BLOOD PRESSURE: 118 MMHG

## 2018-01-31 DIAGNOSIS — R60.9 EDEMA, UNSPECIFIED TYPE: Primary | ICD-10-CM

## 2018-01-31 DIAGNOSIS — L89.629 DECUBITUS ULCER OF LEFT HEEL, UNSPECIFIED ULCER STAGE: ICD-10-CM

## 2018-01-31 DIAGNOSIS — R26.2 AMBULATORY DYSFUNCTION: ICD-10-CM

## 2018-01-31 DIAGNOSIS — L02.619 CELLULITIS AND ABSCESS OF FOOT: ICD-10-CM

## 2018-01-31 DIAGNOSIS — R60.0 BILATERAL LEG EDEMA: ICD-10-CM

## 2018-01-31 DIAGNOSIS — K21.9 GASTROESOPHAGEAL REFLUX DISEASE, ESOPHAGITIS PRESENCE NOT SPECIFIED: ICD-10-CM

## 2018-01-31 DIAGNOSIS — L03.119 CELLULITIS AND ABSCESS OF FOOT: ICD-10-CM

## 2018-01-31 PROCEDURE — 99496 TRANSJ CARE MGMT HIGH F2F 7D: CPT | Performed by: NURSE PRACTITIONER

## 2018-01-31 RX ORDER — POTASSIUM CHLORIDE 750 MG/1
10 CAPSULE, EXTENDED RELEASE ORAL DAILY
Qty: 90 CAPSULE | Refills: 0 | Status: SHIPPED | OUTPATIENT
Start: 2018-01-31

## 2018-01-31 RX ORDER — CEPHALEXIN 500 MG/1
500 CAPSULE ORAL EVERY 12 HOURS SCHEDULED
Qty: 20 CAPSULE | Refills: 0 | Status: SHIPPED | OUTPATIENT
Start: 2018-01-31 | End: 2018-02-15 | Stop reason: HOSPADM

## 2018-01-31 RX ORDER — FEXOFENADINE HCL 180 MG/1
TABLET ORAL
COMMUNITY

## 2018-01-31 RX ORDER — CEPHALEXIN 500 MG/1
500 CAPSULE ORAL 4 TIMES DAILY
Refills: 0 | COMMUNITY
Start: 2017-11-08 | End: 2018-01-31 | Stop reason: ALTCHOICE

## 2018-01-31 RX ORDER — FUROSEMIDE 40 MG/1
40 TABLET ORAL EVERY MORNING
Qty: 90 TABLET | Refills: 0 | Status: SHIPPED | OUTPATIENT
Start: 2018-01-31

## 2018-01-31 NOTE — PROGRESS NOTES
Assessment/Plan:    No problem-specific Assessment & Plan notes found for this encounter  There are no diagnoses linked to this encounter  Subjective:      Patient ID: Rachelle Mendez is a 80 y o  male  Pt is a 80y o  year old male who is here for TCM visit following hospitalization at [facility] - CLOSED from *** with discharge to {Disposition Home Discharge:35274}   Admission diagnosis was ***  Discharge medications reviewed {YES/NO:20200}  Pt {is/is not:21397} experiencing symptoms              {Common ambulatory SmartLinks:28621}    Review of Systems      Objective:     Physical Exam

## 2018-01-31 NOTE — ASSESSMENT & PLAN NOTE
Pt prescribed a 10 day course of cephalexin BID, he will also see wound care for evaluation and treatment plan regarding ulcer

## 2018-01-31 NOTE — ASSESSMENT & PLAN NOTE
Stable, seeking to wean of tx  Protonix order changed to QD and will f/u in 1 month to evaluate readiness for further decrease

## 2018-01-31 NOTE — ASSESSMENT & PLAN NOTE
Pt is not getting wound care at present  I have ordered a consult with wound care for evaluation and possible need for debridement  Concern for cellulitis surrounding the ulcer, antibiotics started

## 2018-01-31 NOTE — PROGRESS NOTES
F2F visit with patient & granddaughter in PCP office  Patient had c/o mild pain in bilateral rib are anteriorly  Patient reports he has had this pain since he fell on 1/3/18  Patient also reports that he has a pressure ulcer on his left heel  Patient is wearing an off loading surgical shoe  Patient/CG verbalize that SN at [address] [place] [address] are caring for the ulcer  Patient states he is receiving PT at [address] [place] [address] but finds it extremely difficult to ambulate with the shoe on  Patient was ambulatory prior to his hospital stay in December for pneumonia  Patient has had a great deal of difficulty improving since then  patient is of advanced age (80)  Patient states he is adjusting to his new surroundings @ [address] [place] [address]  Patient receives ADL assist, meals, medication management and socialization  Educated patient/CG on BPCI and CM role including contact information  Agrees to additional calls

## 2018-01-31 NOTE — ASSESSMENT & PLAN NOTE
Pt continues to receive PT at [facility]  He is compliant with getting assistance for transfers

## 2018-01-31 NOTE — PROGRESS NOTES
Assessment/Plan:    GERD (gastroesophageal reflux disease)  Stable, seeking to wean of tx  Protonix order changed to QD and will f/u in 1 month to evaluate readiness for further decrease  Decubitus ulcer of left heel  Pt is not getting wound care at present  I have ordered a consult with wound care for evaluation and possible need for debridement  Concern for cellulitis surrounding the ulcer, antibiotics started  Ambulatory dysfunction  Pt continues to receive PT at [facility]  He is compliant with getting assistance for transfers  Edema  2+ pitting edema to BLE  Continued with furosemide Qd  CMP ordered  Cellulitis and abscess of foot  Pt prescribed a 10 day course of cephalexin BID, he will also see wound care for evaluation and treatment plan regarding ulcer  Diagnoses and all orders for this visit:    Edema, unspecified type  -     furosemide (LASIX) 40 mg tablet; Take 1 tablet (40 mg total) by mouth every morning  -     potassium chloride (MICRO-K) 10 MEQ CR capsule; Take 1 capsule (10 mEq total) by mouth daily  -     Comprehensive metabolic panel; Future    Decubitus ulcer of left heel, unspecified ulcer stage  -     Ambulatory referral to Wound Care; Future  -     cephalexin (KEFLEX) 500 mg capsule; Take 1 capsule (500 mg total) by mouth every 12 (twelve) hours for 10 days    Cellulitis and abscess of foot  -     cephalexin (KEFLEX) 500 mg capsule; Take 1 capsule (500 mg total) by mouth every 12 (twelve) hours for 10 days    Gastroesophageal reflux disease, esophagitis presence not specified    Bilateral leg edema    Ambulatory dysfunction    Other orders  -     Multiple Vitamins-Minerals (CENTRUM SILVER PO); Take by mouth  -     Trolamine Salicylate (ASPERCREME) 10 % LOTN; Apply topically  -     Discontinue: cephalexin (KEFLEX) 500 mg capsule; Take 500 mg by mouth 4 (four) times a day  -     fexofenadine (ALLEGRA) 180 MG tablet;  Take by mouth  -     Cholecalciferol ([employer]S VITAMIN D) 2000 units CAPS; Take by mouth          Subjective:      Patient ID: Garrick Avendano is a 80 y o  male  Pt is a 80y o  year old male who is here with his grandaughter for TCM visit following rehab at [facility] with discharge to [address] at [facility]   Admission reason was rehab after fall  Discharge medications reviewed yes with changes made and documentation sent with pt for staff     Pt is experiencing symptoms of tenderness and warmth over a decubitus ulcer on his L heel that he developed at rehab  VSS  He states he is adjusting slowly to his new residence at [facility]  He is being compliant with waiting for assistance with transfers and ambulation  No other acute concerns today  The following portions of the patient's history were reviewed and updated as appropriate: allergies, current medications, past family history, past medical history, past social history, past surgical history and problem list     Review of Systems   Constitutional: Positive for activity change, appetite change and fatigue  Negative for chills and fever  HENT: Positive for hearing loss  Respiratory: Negative for cough and shortness of breath  Cardiovascular: Positive for leg swelling  Negative for chest pain and palpitations  Gastrointestinal: Positive for constipation  Negative for diarrhea, nausea and vomiting  Genitourinary: Negative for dysuria  Musculoskeletal: Positive for arthralgias and myalgias  Baseline back pain due to disc disease  Pt reports R thigh muscle cramping that is alleviated with an OTC ointment    Skin: Positive for wound  L heel ulcer as noted in HPI   Allergic/Immunologic: Positive for environmental allergies  Neurological: Positive for weakness  Negative for dizziness, numbness and headaches  Objective:     Physical Exam   Constitutional: Vital signs are normal  He appears well-developed and well-nourished     HENT:   Right Ear: Hearing, tympanic membrane, external ear and ear canal normal    Left Ear: Hearing, tympanic membrane, external ear and ear canal normal    Nose: Nose normal    Mouth/Throat: Oropharynx is clear and moist and mucous membranes are normal    Eyes: Conjunctivae are normal  Pupils are equal, round, and reactive to light  Neck: No thyromegaly present  Cardiovascular: Normal rate, regular rhythm, S1 normal, S2 normal, normal heart sounds and intact distal pulses  Exam reveals decreased pulses  No murmur heard  Pulses:       Popliteal pulses are 1+ on the right side, and 1+ on the left side  Pulmonary/Chest: Effort normal and breath sounds normal    Abdominal: Soft  Bowel sounds are normal  There is no tenderness  Musculoskeletal: Normal range of motion  He exhibits no edema  Neurological: He is alert  He has normal reflexes  Skin: Skin is warm and dry  Lesion noted  L heel with decubitus ulcer that is non draining but with associated warmth, redness, and tenderness concerning for cellulitis  Psychiatric: He has a normal mood and affect  His speech is normal and behavior is normal  Judgment and thought content normal  His mood appears not anxious  His affect is not angry  Vitals reviewed

## 2018-01-31 NOTE — PATIENT INSTRUCTIONS
Cellulitis   AMBULATORY CARE:   Cellulitis  is a skin infection caused by bacteria  Cellulitis usually appears on the legs and feet, arms and hands, or face  Common signs and symptoms include the following:   · A red, warm, swollen area on your skin    · Pain when the area is touched    · Bumps or blisters (abscess) that may drain pus    · Bumpy, raised skin that feels like an orange peel  Call 911 if:   · You have sudden trouble breathing or chest pain  Seek care immediately if:   · Your wound gets larger and more painful  · You feel a crackling under your skin when you touch it  · You have purple dots or bumps on your skin, or you see bleeding under your skin  · You have new swelling and pain in your legs  · The red, warm, swollen area gets larger  · You see red streaks coming from the infected area  Contact your healthcare provider if:   · You have a fever  · Your fever or pain does not go away or gets worse  · The area does not get smaller after 2 days of antibiotics  · Your skin is flaking or peeling off  · You have questions or concerns about your condition or care  Treatment for cellulitis  may decrease symptoms, stop the infection from spreading, and cure the infection  Treatment depends on how severe your cellulitis is  Cellulitis may go away on its own  You may  instead need antibiotics to help treat the bacterial infection and medicines for pain  Your healthcare provider may draw a [place] around the edges of your cellulitis  If your cellulitis spreads, your healthcare provider will see it outside of the [place]  Manage your symptoms:   · Elevate the area above the level of your heart  as often as you can  This will help decrease swelling and pain  Prop the area on pillows or blankets to keep it elevated comfortably  · Clean the area daily until the wound scabs over  Gently wash the area with soap and water  Pat dry  Use dressings as directed       · Place cool or warm, wet cloths on the area as directed  Use clean cloths and clean water  Leave it on the area until the cloth is room temperature  Pat the area dry with a clean, dry cloth  The cloths may help decrease pain  Prevent cellulitis:   · Do not scratch bug bites or areas of injury  You increase your risk for cellulitis by scratching these areas  · Do not share personal items, such as towels, clothing, and razors  · Clean exercise equipment  with germ-killing  before and after you use it  · Wash your hands often  Use soap and water  Wash your hands after you use the bathroom, change a child's diapers, or sneeze  Wash your hands before you prepare or eat food  Use lotion to prevent dry, cracked skin  · Wear pressure stockings as directed  You may be told to wear the stockings if you have peripheral edema  The stockings improve blood flow and decrease swelling  · Treat athlete's foot  This can help prevent the spread of a bacterial skin infection  Follow up with your healthcare provider within 3 days, or as directed: Your healthcare provider will check if your cellulitis is getting better  You may need different medicine  Write down your questions so you remember to ask them during your visits  © 2017 [employer]  Information is for End User's use only and may not be sold, redistributed or otherwise used for commercial purposes  All illustrations and images included in CareNotes® are the copyrighted property of [employer]  or [address]  The above information is an  only  It is not intended as medical advice for individual conditions or treatments  Talk to your doctor, nurse or pharmacist before following any medical regimen to see if it is safe and effective for you

## 2018-02-01 ENCOUNTER — TELEPHONE (OUTPATIENT)
Dept: INTERNAL MEDICINE CLINIC | Facility: CLINIC | Age: 83
End: 2018-02-01

## 2018-02-01 NOTE — TELEPHONE ENCOUNTER
Dominique from [address] [place] [address] wanted clarification regarding Pt Vitamin D/D3 dose  Pt is currently taking Vitamin D3 1000 units daily  Did you want him to take Vitamin D 5000 units or rather take Vitamin D3 5000 units  She will accept a verbal, so I can call her back as soon as you let me know, thank you!

## 2018-02-01 NOTE — TELEPHONE ENCOUNTER
Called Dominique back from [facility] to inform her of Pt Vitamin D3 instruction (1000 units BID)

## 2018-02-06 ENCOUNTER — TELEPHONE (OUTPATIENT)
Dept: INTERNAL MEDICINE CLINIC | Facility: CLINIC | Age: 83
End: 2018-02-06

## 2018-02-06 ENCOUNTER — ANTICOAG VISIT (OUTPATIENT)
Dept: INTERNAL MEDICINE CLINIC | Facility: CLINIC | Age: 83
End: 2018-02-06

## 2018-02-06 DIAGNOSIS — G89.4 CHRONIC PAIN SYNDROME: Primary | ICD-10-CM

## 2018-02-06 RX ORDER — OXYCODONE AND ACETAMINOPHEN 2.5; 325 MG/1; MG/1
1 TABLET ORAL EVERY 6 HOURS PRN
Qty: 50 TABLET | Refills: 0 | Status: ON HOLD | OUTPATIENT
Start: 2018-02-06 | End: 2018-02-15

## 2018-02-06 NOTE — TELEPHONE ENCOUNTER
Patients daughter called  anselmo has an order for oxycodone 2 5 mg 1 every 12 hours as needed  Its not helping and she would like the order changed to one every 6 to 8 hours as need if possible  There is no order for this medication on his med list       If this is change is ok with you, they need an order faxed to anselmo   [phone]      Any questions please call daren at [phone]

## 2018-02-06 NOTE — TELEPHONE ENCOUNTER
Joelle Number called and  Needs a coumadin order for tonight, they chava his blood but it was not enough blood collected so they re-chava it but will not have the results until tomorrow would you please give them a dosage just for tonight and they will get the results to you tomorrow    Joelle Thompson can be reached at [phone]

## 2018-02-06 NOTE — TELEPHONE ENCOUNTER
Patient called wanting to change the dose of the meds but didn't know the name of the med  She will call back

## 2018-02-07 LAB
INR PPP: 2.1 (ref 0.86–1.16)
INR PPP: 2.1 (ref 0.86–1.16)

## 2018-02-07 NOTE — PROGRESS NOTES
Dr Jeannine Mejias was not in the office on 2/7 at the time of the INR report  Dr Eyad Weeks consulted and we reported the results back to [facility]  The dosage will be the same (5, 5, 2 5 and then repeat) and recheck in 2 weeks

## 2018-02-09 ENCOUNTER — ANTICOAG VISIT (OUTPATIENT)
Dept: INTERNAL MEDICINE CLINIC | Facility: CLINIC | Age: 83
End: 2018-02-09

## 2018-02-10 ENCOUNTER — APPOINTMENT (EMERGENCY)
Dept: RADIOLOGY | Facility: HOSPITAL | Age: 83
DRG: 593 | End: 2018-02-10
Payer: MEDICARE

## 2018-02-10 ENCOUNTER — HOSPITAL ENCOUNTER (INPATIENT)
Facility: HOSPITAL | Age: 83
LOS: 5 days | Discharge: HOSPICE | DRG: 593 | End: 2018-02-15
Attending: EMERGENCY MEDICINE | Admitting: INTERNAL MEDICINE
Payer: MEDICARE

## 2018-02-10 DIAGNOSIS — L89.150 DECUBITUS ULCER OF SACRAL REGION, UNSTAGEABLE (HCC): ICD-10-CM

## 2018-02-10 DIAGNOSIS — L03.319 CELLULITIS OF SACRAL REGION: ICD-10-CM

## 2018-02-10 DIAGNOSIS — L89.152 SACRAL DECUBITUS ULCER, STAGE II (HCC): Primary | ICD-10-CM

## 2018-02-10 DIAGNOSIS — I48.20 CHRONIC ATRIAL FIBRILLATION (HCC): ICD-10-CM

## 2018-02-10 DIAGNOSIS — G89.4 CHRONIC PAIN SYNDROME: ICD-10-CM

## 2018-02-10 DIAGNOSIS — R26.2 AMBULATORY DYSFUNCTION: ICD-10-CM

## 2018-02-10 DIAGNOSIS — R50.9 FEVER, UNSPECIFIED FEVER CAUSE: ICD-10-CM

## 2018-02-10 LAB
ANION GAP SERPL CALCULATED.3IONS-SCNC: 10 MMOL/L (ref 4–13)
BASOPHILS # BLD AUTO: 0.01 THOUSANDS/ΜL (ref 0–0.1)
BASOPHILS NFR BLD AUTO: 0 % (ref 0–1)
BUN SERPL-MCNC: 23 MG/DL (ref 5–25)
CALCIUM SERPL-MCNC: 8.6 MG/DL (ref 8.3–10.1)
CHLORIDE SERPL-SCNC: 105 MMOL/L (ref 100–108)
CLARITY, POC: NORMAL
CO2 SERPL-SCNC: 25 MMOL/L (ref 21–32)
COLOR, POC: NORMAL
CREAT SERPL-MCNC: 1.26 MG/DL (ref 0.6–1.3)
EOSINOPHIL # BLD AUTO: 0.03 THOUSAND/ΜL (ref 0–0.61)
EOSINOPHIL NFR BLD AUTO: 0 % (ref 0–6)
ERYTHROCYTE [DISTWIDTH] IN BLOOD BY AUTOMATED COUNT: 16.1 % (ref 11.6–15.1)
EXT BILIRUBIN, UA: NEGATIVE
EXT BLOOD URINE: NORMAL
EXT GLUCOSE, UA: NEGATIVE
EXT KETONES: NORMAL
EXT NITRITE, UA: NEGATIVE
EXT PH, UA: 6
EXT PROTEIN, UA: NORMAL
EXT SPECIFIC GRAVITY, UA: 1.02
EXT UROBILINOGEN: 1
GFR SERPL CREATININE-BSD FRML MDRD: 47 ML/MIN/1.73SQ M
GLUCOSE SERPL-MCNC: 113 MG/DL (ref 65–140)
HCT VFR BLD AUTO: 28.9 % (ref 36.5–49.3)
HGB BLD-MCNC: 9.1 G/DL (ref 12–17)
INR PPP: 1.8 (ref 0.86–1.16)
LYMPHOCYTES # BLD AUTO: 1.55 THOUSANDS/ΜL (ref 0.6–4.47)
LYMPHOCYTES NFR BLD AUTO: 13 % (ref 14–44)
MCH RBC QN AUTO: 25.9 PG (ref 26.8–34.3)
MCHC RBC AUTO-ENTMCNC: 31.5 G/DL (ref 31.4–37.4)
MCV RBC AUTO: 82 FL (ref 82–98)
MONOCYTES # BLD AUTO: 0.84 THOUSAND/ΜL (ref 0.17–1.22)
MONOCYTES NFR BLD AUTO: 7 % (ref 4–12)
NEUTROPHILS # BLD AUTO: 9.41 THOUSANDS/ΜL (ref 1.85–7.62)
NEUTS SEG NFR BLD AUTO: 80 % (ref 43–75)
PLATELET # BLD AUTO: 259 THOUSANDS/UL (ref 149–390)
PMV BLD AUTO: 9.6 FL (ref 8.9–12.7)
POTASSIUM SERPL-SCNC: 4 MMOL/L (ref 3.5–5.3)
PROTHROMBIN TIME: 21.5 SECONDS (ref 12.1–14.4)
RBC # BLD AUTO: 3.51 MILLION/UL (ref 3.88–5.62)
SODIUM SERPL-SCNC: 140 MMOL/L (ref 136–145)
WBC # BLD AUTO: 11.84 THOUSAND/UL (ref 4.31–10.16)
WBC # BLD EST: NEGATIVE 10*3/UL

## 2018-02-10 PROCEDURE — 96360 HYDRATION IV INFUSION INIT: CPT

## 2018-02-10 PROCEDURE — 87040 BLOOD CULTURE FOR BACTERIA: CPT | Performed by: EMERGENCY MEDICINE

## 2018-02-10 PROCEDURE — 80048 BASIC METABOLIC PNL TOTAL CA: CPT | Performed by: EMERGENCY MEDICINE

## 2018-02-10 PROCEDURE — 71045 X-RAY EXAM CHEST 1 VIEW: CPT

## 2018-02-10 PROCEDURE — 85025 COMPLETE CBC W/AUTO DIFF WBC: CPT | Performed by: EMERGENCY MEDICINE

## 2018-02-10 PROCEDURE — 87186 SC STD MICRODIL/AGAR DIL: CPT | Performed by: EMERGENCY MEDICINE

## 2018-02-10 PROCEDURE — 36415 COLL VENOUS BLD VENIPUNCTURE: CPT | Performed by: EMERGENCY MEDICINE

## 2018-02-10 PROCEDURE — 85610 PROTHROMBIN TIME: CPT | Performed by: EMERGENCY MEDICINE

## 2018-02-10 PROCEDURE — 87086 URINE CULTURE/COLONY COUNT: CPT | Performed by: EMERGENCY MEDICINE

## 2018-02-10 PROCEDURE — 99223 1ST HOSP IP/OBS HIGH 75: CPT | Performed by: INTERNAL MEDICINE

## 2018-02-10 PROCEDURE — 81002 URINALYSIS NONAUTO W/O SCOPE: CPT | Performed by: EMERGENCY MEDICINE

## 2018-02-10 PROCEDURE — 99285 EMERGENCY DEPT VISIT HI MDM: CPT

## 2018-02-10 PROCEDURE — 87798 DETECT AGENT NOS DNA AMP: CPT | Performed by: INTERNAL MEDICINE

## 2018-02-10 PROCEDURE — 87077 CULTURE AEROBIC IDENTIFY: CPT | Performed by: EMERGENCY MEDICINE

## 2018-02-10 RX ORDER — GALANTAMINE HYDROBROMIDE 4 MG/1
4 TABLET, FILM COATED ORAL DAILY
Status: DISCONTINUED | OUTPATIENT
Start: 2018-02-11 | End: 2018-02-15 | Stop reason: HOSPADM

## 2018-02-10 RX ORDER — OXYCODONE HYDROCHLORIDE 5 MG/1
5 TABLET ORAL ONCE
Status: COMPLETED | OUTPATIENT
Start: 2018-02-10 | End: 2018-02-10

## 2018-02-10 RX ORDER — TOLTERODINE 2 MG/1
2 CAPSULE, EXTENDED RELEASE ORAL DAILY
Status: DISCONTINUED | OUTPATIENT
Start: 2018-02-11 | End: 2018-02-15 | Stop reason: HOSPADM

## 2018-02-10 RX ORDER — SENNOSIDES 8.6 MG
2 TABLET ORAL 2 TIMES DAILY
Status: DISCONTINUED | OUTPATIENT
Start: 2018-02-10 | End: 2018-02-15 | Stop reason: HOSPADM

## 2018-02-10 RX ORDER — FUROSEMIDE 40 MG/1
40 TABLET ORAL EVERY MORNING
Status: DISCONTINUED | OUTPATIENT
Start: 2018-02-11 | End: 2018-02-15 | Stop reason: HOSPADM

## 2018-02-10 RX ORDER — POLYETHYLENE GLYCOL 3350 17 G/17G
17 POWDER, FOR SOLUTION ORAL DAILY PRN
COMMUNITY

## 2018-02-10 RX ORDER — FINASTERIDE 5 MG/1
5 TABLET, FILM COATED ORAL
Status: DISCONTINUED | OUTPATIENT
Start: 2018-02-10 | End: 2018-02-15 | Stop reason: HOSPADM

## 2018-02-10 RX ORDER — ONDANSETRON 2 MG/ML
4 INJECTION INTRAMUSCULAR; INTRAVENOUS EVERY 6 HOURS PRN
Status: DISCONTINUED | OUTPATIENT
Start: 2018-02-10 | End: 2018-02-15 | Stop reason: HOSPADM

## 2018-02-10 RX ORDER — ACETAMINOPHEN 325 MG/1
650 TABLET ORAL EVERY 6 HOURS PRN
Status: DISCONTINUED | OUTPATIENT
Start: 2018-02-10 | End: 2018-02-15 | Stop reason: HOSPADM

## 2018-02-10 RX ORDER — CALCIUM CARBONATE 200(500)MG
1000 TABLET,CHEWABLE ORAL DAILY PRN
Status: DISCONTINUED | OUTPATIENT
Start: 2018-02-10 | End: 2018-02-15 | Stop reason: HOSPADM

## 2018-02-10 RX ORDER — LORATADINE 10 MG/1
10 TABLET ORAL DAILY
COMMUNITY
End: 2018-02-15 | Stop reason: HOSPADM

## 2018-02-10 RX ORDER — POTASSIUM CHLORIDE 20MEQ/15ML
20 LIQUID (ML) ORAL DAILY
Status: DISCONTINUED | OUTPATIENT
Start: 2018-02-11 | End: 2018-02-15 | Stop reason: HOSPADM

## 2018-02-10 RX ORDER — PANTOPRAZOLE SODIUM 40 MG/1
40 TABLET, DELAYED RELEASE ORAL
Status: DISCONTINUED | OUTPATIENT
Start: 2018-02-11 | End: 2018-02-15 | Stop reason: HOSPADM

## 2018-02-10 RX ORDER — DOCUSATE SODIUM 100 MG/1
100 CAPSULE, LIQUID FILLED ORAL 2 TIMES DAILY PRN
Status: DISCONTINUED | OUTPATIENT
Start: 2018-02-10 | End: 2018-02-15 | Stop reason: HOSPADM

## 2018-02-10 RX ORDER — WARFARIN SODIUM 2.5 MG/1
5 TABLET ORAL
Status: DISCONTINUED | OUTPATIENT
Start: 2018-02-10 | End: 2018-02-13

## 2018-02-10 RX ORDER — FLECAINIDE ACETATE 50 MG/1
50 TABLET ORAL EVERY MORNING
Status: DISCONTINUED | OUTPATIENT
Start: 2018-02-11 | End: 2018-02-15 | Stop reason: HOSPADM

## 2018-02-10 RX ORDER — ACETAMINOPHEN 325 MG/1
650 TABLET ORAL ONCE
Status: COMPLETED | OUTPATIENT
Start: 2018-02-10 | End: 2018-02-10

## 2018-02-10 RX ORDER — GABAPENTIN 100 MG/1
100 CAPSULE ORAL DAILY
Status: DISCONTINUED | OUTPATIENT
Start: 2018-02-11 | End: 2018-02-15 | Stop reason: HOSPADM

## 2018-02-10 RX ORDER — ECHINACEA PURPUREA EXTRACT 125 MG
2 TABLET ORAL AS NEEDED
Status: DISCONTINUED | OUTPATIENT
Start: 2018-02-10 | End: 2018-02-15 | Stop reason: HOSPADM

## 2018-02-10 RX ORDER — CYCLOSPORINE 0.5 MG/ML
1 EMULSION OPHTHALMIC 2 TIMES DAILY
Status: DISCONTINUED | OUTPATIENT
Start: 2018-02-10 | End: 2018-02-15 | Stop reason: HOSPADM

## 2018-02-10 RX ORDER — MELATONIN
1000 2 TIMES DAILY
COMMUNITY

## 2018-02-10 RX ORDER — TRAZODONE HYDROCHLORIDE 50 MG/1
50 TABLET ORAL
Status: DISCONTINUED | OUTPATIENT
Start: 2018-02-10 | End: 2018-02-15 | Stop reason: HOSPADM

## 2018-02-10 RX ORDER — FLUTICASONE PROPIONATE 50 MCG
1 SPRAY, SUSPENSION (ML) NASAL 2 TIMES DAILY PRN
COMMUNITY

## 2018-02-10 RX ORDER — TAMSULOSIN HYDROCHLORIDE 0.4 MG/1
0.4 CAPSULE ORAL
Status: DISCONTINUED | OUTPATIENT
Start: 2018-02-10 | End: 2018-02-15 | Stop reason: HOSPADM

## 2018-02-10 RX ADMIN — VANCOMYCIN HYDROCHLORIDE 1250 MG: 1 INJECTION, POWDER, LYOPHILIZED, FOR SOLUTION INTRAVENOUS at 16:05

## 2018-02-10 RX ADMIN — FINASTERIDE 5 MG: 5 TABLET, FILM COATED ORAL at 21:34

## 2018-02-10 RX ADMIN — SODIUM CHLORIDE 500 ML: 0.9 INJECTION, SOLUTION INTRAVENOUS at 12:19

## 2018-02-10 RX ADMIN — OXYCODONE HYDROCHLORIDE 5 MG: 5 TABLET ORAL at 13:06

## 2018-02-10 RX ADMIN — ACETAMINOPHEN 650 MG: 325 TABLET, FILM COATED ORAL at 13:05

## 2018-02-10 RX ADMIN — SENNOSIDES 17.2 MG: 8.6 TABLET, FILM COATED ORAL at 18:45

## 2018-02-10 RX ADMIN — TAMSULOSIN HYDROCHLORIDE 0.4 MG: 0.4 CAPSULE ORAL at 18:45

## 2018-02-10 RX ADMIN — CEFEPIME HYDROCHLORIDE 2000 MG: 2 INJECTION, POWDER, FOR SOLUTION INTRAVENOUS at 15:34

## 2018-02-10 RX ADMIN — TRAZODONE HYDROCHLORIDE 50 MG: 50 TABLET ORAL at 21:34

## 2018-02-10 RX ADMIN — WARFARIN SODIUM 5 MG: 2.5 TABLET ORAL at 18:45

## 2018-02-10 NOTE — ED PROVIDER NOTES
History  Chief Complaint   Patient presents with    Fever - 75 years or older     pts daughter reports [facility] at [facility] this morning  pt reports b/l groin and flank pain since tuesday  hx of UTIs  80 YR MALE FROM LOCAL LONG TERM CARE FACILITY  WITH DECLINING CONDITION --  AS PER LONG TERM CARE FACILITY STAFF WITH FEVERS YESTERDAY -- PT NOT GIVEN ANY ANTIPYRETICS TODAY -- AS PER DAUGHTER PT HAS LEFT HEAL DECUB-- AND C/O GROIN PAIN TODAY -- NO CP/SOB/URI COMPS/ COUGH -- NO ABD PAIN- NO  V/D- NO URIANRY COMPS--  PT C/O R LOWER RIB PAIN SICNE FALL 11/17 NOT NEW         History provided by:  Patient and relative   used: No        Prior to Admission Medications   Prescriptions Last Dose Informant Patient Reported? Taking? Cholecalciferol ([employer]S VITAMIN D) 2000 units CAPS   Yes No   Sig: Take by mouth   Multiple Vitamins-Minerals (CENTRUM SILVER PO)   Yes No   Sig: Take by mouth   Trolamine Salicylate (ASPERCREME) 10 % LOTN   Yes No   Sig: Apply topically   cephalexin (KEFLEX) 500 mg capsule   No No   Sig: Take 1 capsule (500 mg total) by mouth every 12 (twelve) hours for 10 days   cycloSPORINE (RESTASIS) 0 05 % ophthalmic emulsion  Child Yes No   Sig: Administer 1 drop to both eyes 2 (two) times a day  famciclovir (FAMVIR) 125 MG tablet  Child Yes No   Sig: Take 125 mg by mouth every morning     fexofenadine (ALLEGRA) 180 MG tablet   Yes No   Sig: Take by mouth   finasteride (PROSCAR) 5 mg tablet  Child Yes No   Sig: Take 5 mg by mouth daily at bedtime     flecainide (TAMBOCOR) 50 mg tablet  Child Yes No   Sig: Take 50 mg by mouth every morning     furosemide (LASIX) 40 mg tablet   No No   Sig: Take 1 tablet (40 mg total) by mouth every morning   gabapentin (NEURONTIN) 100 mg capsule  Child Yes No   Sig: Take 100 mg by mouth daily   galantamine (RAZADYNE) 4 mg tablet  Child Yes No   Sig: Take 4 mg by mouth daily     oxyCODONE-acetaminophen (PERCOCET) 2 5-325 MG per tablet   No No   Sig: Take 1 tablet by mouth every 6 (six) hours as needed for moderate pain Max Daily Amount: 4 tablets   pantoprazole (PROTONIX) 40 mg tablet  Child Yes No   Sig: Take 40 mg by mouth 2 (two) times a day     potassium chloride (MICRO-K) 10 MEQ CR capsule   No No   Sig: Take 1 capsule (10 mEq total) by mouth daily   senna (SENOKOT) 8 6 mg   No No   Sig: Take 2 tablets by mouth 2 (two) times a day   sodium chloride (OCEAN) 0 65 % nasal spray   No No   Si sprays into each nostril as needed for congestion for up to 30 days  solifenacin (VESICARE) 5 mg tablet  Child Yes No   Sig: Take 5 mg by mouth daily at bedtime     tamsulosin (FLOMAX) 0 4 mg  Child Yes No   Sig: Take 0 4 mg by mouth daily with dinner  traZODone (DESYREL) 50 mg tablet  Child Yes No   Sig: Take 50 mg by mouth daily at bedtime     warfarin (COUMADIN) 5 mg tablet   No No   Si mg for 3 days and then 2 5 mg every 4th day      Facility-Administered Medications: None       Past Medical History:   Diagnosis Date    Atrial fibrillation (HCC)     BPH with obstruction/lower urinary tract symptoms     Coronary artery disease     Depression     DVT (deep venous thrombosis) (HCC)     Fracture of radius and ulna, distal     History of compression fracture of spine     Organic impotence     Pacemaker     S/P IVC filter     SSS (sick sinus syndrome) (HCC)     Venous insufficiency (chronic) (peripheral)        Past Surgical History:   Procedure Laterality Date    APPENDECTOMY      CHOLECYSTECTOMY      CYSTOSCOPY  2007    HIP SURGERY      ,        Family History   Problem Relation Age of Onset    Heart disease Brother     Cancer Family     Heart disease Family     Stroke Family      I have reviewed and agree with the history as documented      Social History   Substance Use Topics    Smoking status: Former Smoker     Types: Cigarettes     Quit date: 1950    Smokeless tobacco: Never Used    Alcohol use Yes      Comment: social        Review of Systems   Constitutional: Positive for activity change and fever  Negative for appetite change, chills, diaphoresis, fatigue and unexpected weight change  HENT: Negative  Eyes: Negative  Respiratory: Negative  Cardiovascular: Negative  Gastrointestinal: Negative  Endocrine: Negative  Genitourinary: Negative  GROIN PAIN    Musculoskeletal: Negative  Skin: Positive for wound  Negative for color change, pallor and rash  Allergic/Immunologic: Negative  Neurological: Negative  Hematological: Negative  Psychiatric/Behavioral: Negative  Physical Exam  ED Triage Vitals [02/10/18 0940]   Temperature Pulse Respirations Blood Pressure SpO2   98 4 °F (36 9 °C) 94 16 111/56 96 %      Temp Source Heart Rate Source Patient Position - Orthostatic VS BP Location FiO2 (%)   Oral Monitor -- -- --      Pain Score       5           Orthostatic Vital Signs  Vitals:    02/10/18 0940   BP: 111/56   Pulse: 94       Physical Exam   Constitutional: He is oriented to person, place, and time  No distress  AVSS--  PULSE OX 96 % ON RA- INTPRETATION IS NORMAKL- NO INTERVENTION - FRAIL   HENT:   Head: Normocephalic and atraumatic  Eyes: Conjunctivae and EOM are normal  Pupils are equal, round, and reactive to light  Right eye exhibits no discharge  Left eye exhibits no discharge  No scleral icterus  Neck: Normal range of motion  Neck supple  No JVD present  No tracheal deviation present  No thyromegaly present  Cardiovascular: Normal rate and regular rhythm  Exam reveals no gallop and no friction rub  Murmur heard  Pulmonary/Chest: Effort normal and breath sounds normal  No stridor  No respiratory distress  He has no wheezes  He has no rales  He exhibits no tenderness  Abdominal: Soft  Bowel sounds are normal  He exhibits no distension and no mass  There is no tenderness  There is no rebound and no guarding  No hernia     SOFT- NT- NO PULSATILE ABD MASS/BRUIT- NO CVA TENDERNESS- NO PERITONEAL SIGNS   Genitourinary: Rectum normal    Genitourinary Comments: UNCIRCUMSIZED-- NO BALANITIS MILD D/C FROM GLANS-- NORMAL TESTICLE/SCROTAL L[place] EXAM    Musculoskeletal: Normal range of motion  He exhibits edema  He exhibits no tenderness or deformity  1 PLUS BLE PRETIBIAL EDEMA-- LEFT CALNCEAL ULCER- SMALL NO SURROUNDING CELLULITIS- NO NECROSIS-ASCENDING ERYTHEMA- NO CREPITUS   Lymphadenopathy:     He has no cervical adenopathy  Neurological: He is alert and oriented to person, place, and time  No cranial nerve deficit or sensory deficit  He exhibits normal muscle tone  Skin: Capillary refill takes less than 2 seconds  No rash noted  He is not diaphoretic  There is erythema  No pallor  PALM SIZED AREA OF MID UPPER SACRAL DECUB WITH OPEN AREA-  SS DRAINAGE- CENTRAL NECROSIS- NO CREPITUS- WITH SURROUNDING WARMTH/TENDNERESS- NO ABSCESS   Psychiatric: He has a normal mood and affect  His behavior is normal    Nursing note and vitals reviewed        ED Medications  Medications   acetaminophen (TYLENOL) tablet 650 mg (650 mg Oral Given 2/10/18 1305)   sodium chloride 0 9 % bolus 500 mL (500 mL Intravenous New Bag 2/10/18 1219)   oxyCODONE (ROXICODONE) IR tablet 5 mg (5 mg Oral Given 2/10/18 1306)       Diagnostic Studies  Results Reviewed     Procedure Component Value Units Date/Time    POCT urinalysis dipstick [[id number]]  (Normal) Resulted:  02/10/18 1329    Lab Status:  Final result Specimen:  Urine Updated:  02/10/18 1330     Color, UA romelia     Clarity, UA slightly cloudy     EXT Glucose, UA negative     EXT Bilirubin, UA (Ref: Negative) negative     EXT Ketones, UA (Ref: Negative) TRACE     EXT Spec Grav, UA 1 020     EXT Blood, UA (Ref: Negative) TRACE     EXT pH, UA 6 0     EXT Protein, UA (Ref: Negative) 30 mg/dL     EXT Urobilinogen, UA (Ref: 0 2- 1 0) 1 0     EXT Leukocytes, UA (Ref: Negative) negative     EXT Nitrite, UA (Ref: Negative) negative    Basic metabolic panel [[id number]] Collected:  02/10/18 1218    Lab Status:  Final result Specimen:  Blood from Arm, Right Updated:  02/10/18 1250     Sodium 140 mmol/L      Potassium 4 0 mmol/L      Chloride 105 mmol/L      CO2 25 mmol/L      Anion Gap 10 mmol/L      BUN 23 mg/dL      Creatinine 1 26 mg/dL      Glucose 113 mg/dL      Calcium 8 6 mg/dL      eGFR 47 ml/min/1 73sq m     Narrative:         National Kidney Disease Education Program recommendations are as follows:  GFR calculation is accurate only with a steady state creatinine  Chronic Kidney disease less than 60 ml/min/1 73 sq  meters  Kidney failure less than 15 ml/min/1 73 sq  meters      Protime-INR [[id number]]  (Abnormal) Collected:  02/10/18 1218    Lab Status:  Final result Specimen:  Blood from Arm, Right Updated:  02/10/18 1249     Protime 21 5 (H) seconds      INR 1 80 (H)    CBC and differential [[id number]]  (Abnormal) Collected:  02/10/18 1218    Lab Status:  Final result Specimen:  Blood from Arm, Right Updated:  02/10/18 1234     WBC 11 84 (H) Thousand/uL      RBC 3 51 (L) Million/uL      Hemoglobin 9 1 (L) g/dL      Hematocrit 28 9 (L) %      MCV 82 fL      MCH 25 9 (L) pg      MCHC 31 5 g/dL      RDW 16 1 (H) %      MPV 9 6 fL      Platelets 841 Thousands/uL      Neutrophils Relative 80 (H) %      Lymphocytes Relative 13 (L) %      Monocytes Relative 7 %      Eosinophils Relative 0 %      Basophils Relative 0 %      Neutrophils Absolute 9 41 (H) Thousands/µL      Lymphocytes Absolute 1 55 Thousands/µL      Monocytes Absolute 0 84 Thousand/µL      Eosinophils Absolute 0 03 Thousand/µL      Basophils Absolute 0 01 Thousands/µL                  XR chest 1 view portable    (Results Pending)              Procedures  Procedures       Phone Contacts  ED Phone Contact    ED Course  ED Course as of Feb 10 1407   Sat Feb 10, 2018   1403 CXR PA/LAST-- COMPARED TO PREVIOUS CXR FROM  12/17-- NO SIGN CHANGES-- DUAL LEAD PACEMAKER NO EVIDENCE OF INFILTRATE/ FREE/SQ AIR [facility]  CritCare Time    Disposition  Final diagnoses:   None     ED Disposition     None      Follow-up Information    None       Patient's Medications   Discharge Prescriptions    No medications on file     No discharge procedures on file      ED Provider  Electronically Signed by           Alexandra Simms MD  02/10/18 8368       Alexandra Simms MD  02/10/18 6602

## 2018-02-10 NOTE — H&P
H&P- Benito Mckeon 3/30/1920, 80 y o  male MRN: [id number]    Unit/Bed#: -01 Encounter: [id number]    Primary Care Provider: Sharron Lisa MD   Date and time admitted to hospital: 2/10/2018 10:40 AM        * Fever   Assessment & Plan    Unclear etiology, afebrile in ED      Chest x-ray negative  UA negative  Obtain influenza RSV PCR              Decubitus ulcer of sacral region, stage 2   Assessment & Plan    Does not look infected  Will consult wound management  Continue barrier cream        Bilateral leg edema   Assessment & Plan    Likely due to chronic diastolic CHF    Continue Lasix 40 mg daily  Monitor I&Os  Continue potassium repletion        Ambulatory dysfunction   Assessment & Plan    Mostly wheelchair bound  Lives at assisted living facility        Chronic atrial fibrillation [facility])   Assessment & Plan    Anticoagulated on warfarin  Slightly subtherapeutic  Check daily INR          SSS (sick sinus syndrome) ([facility])   Assessment & Plan    Status post pacemaker placement  Outpatient follow-up/device Clinic        Benign prostatic hyperplasia with lower urinary tract symptoms   Assessment & Plan    Continue finasteride and tamsulosin  Chronically incontinent          VTE Prophylaxis: Warfarin (Coumadin)  / sequential compression device   Code Status:  DNR level 3  POLST: There is no POLST form on file for this patient (pre-hospital)  Discussion with family:  Granddaughter at bedside updated    Anticipated Length of Stay:  Patient will be admitted on an Inpatient basis with an anticipated length of stay of  > 2 midnights  Justification for Hospital Stay:  Fever requiring further workup    Total Time for Visit, including Counseling / Coordination of Care: 70 minutes  Greater than 50% of this total time spent on direct patient counseling and coordination of care      Chief Complaint:  Fever    History of Present Illness:    Benito Mckeon is a 80 y o  male who presents with fever, temperature 101 3° F at assisted living facility  According to his daughter he had temperature of 101 3° at assisted living facility, subsequently brought to the emergency department for evaluation  Patient reports mild cough, but no phlegm  Denies any chest pain or shortness of breath  Limited mobility, mostly spends time in chair or bed  He has pressure ulcer in sacral region  Review of Systems:    Review of Systems  Twelve point review systems negative except noted above  Past Medical and Surgical History:     Past Medical History:   Diagnosis Date    Atrial fibrillation ([address] )     BPH with obstruction/lower urinary tract symptoms     Coronary artery disease     Depression     [facility] thrombosis) ([facility])     Fracture of radius and ulna, distal     History of compression fracture of spine     Organic impotence     Pacemaker     S/P IVC filter     SSS [facility]sick sinus syndrome) ([facility])     Venous insufficiency (chronic) (peripheral)        Past Surgical History:   Procedure Laterality Date    APPENDECTOMY      CHOLECYSTECTOMY      CYSTOSCOPY  07/19/2007    HIP SURGERY      1997, 2000       Meds/Allergies:    Prior to Admission medications    Medication Sig Start Date End Date Taking? Authorizing Provider   cholecalciferol (VITAMIN D3) 1,000 units tablet Take 1,000 Units by mouth 2 (two) times a day   Yes Historical Provider, MD   cycloSPORINE (RESTASIS) 0 05 % ophthalmic emulsion Administer 1 drop to both eyes 2 (two) times a day     Yes Historical Provider, MD   famciclovir (FAMVIR) 125 MG tablet Take 125 mg by mouth every morning   8/23/16  Yes Historical Provider, MD   finasteride (PROSCAR) 5 mg tablet Take 5 mg by mouth daily at bedtime   8/23/16  Yes Historical Provider, MD   flecainide (TAMBOCOR) 50 mg tablet Take 50 mg by mouth every morning   5/26/16  Yes Historical Provider, MD   fluticasone (FLONASE) 50 mcg/act nasal spray 1 spray into each nostril 2 (two) times a day as needed for rhinitis Yes Historical Provider, MD   furosemide (LASIX) 40 mg tablet Take 1 tablet (40 mg total) by mouth every morning 1/31/18  Yes ANTONIO Gallagher   gabapentin (NEURONTIN) 100 mg capsule Take 100 mg by mouth daily   Yes Historical Provider, MD   loratadine (CLARITIN) 10 mg tablet Take 10 mg by mouth daily   Yes Historical Provider, MD   oxyCODONE-acetaminophen (PERCOCET) 2 5-325 MG per tablet Take 1 tablet by mouth every 6 (six) hours as needed for moderate pain Max Daily Amount: 4 tablets 2/6/18  Yes Neetu Gold MD   pantoprazole (PROTONIX) 40 mg tablet Take 40 mg by mouth 2 (two) times a day   3/28/12  Yes Historical Provider, MD   polyethylene glycol (MIRALAX) 17 g packet Take 17 g by mouth daily as needed   Yes Historical Provider, MD   potassium chloride (MICRO-K) 10 MEQ CR capsule Take 1 capsule (10 mEq total) by mouth daily 1/31/18  Yes ANTONIO Gallagher   senna (SENOKOT) 8 6 mg Take 2 tablets by mouth 2 (two) times a day 12/8/17  Yes Cinthia Mckeon MD   solifenacin (VESICARE) 5 mg tablet Take 5 mg by mouth daily at bedtime   3/12/15  Yes Historical Provider, MD   tamsulosin (FLOMAX) 0 4 mg Take 0 4 mg by mouth daily with dinner  Yes Historical Provider, MD   warfarin (COUMADIN) 5 mg tablet 5 mg for 3 days and then 2 5 mg every 4th day 12/11/17  Yes Cinthia Mckeon MD   cephalexin (KEFLEX) 500 mg capsule Take 1 capsule (500 mg total) by mouth every 12 (twelve) hours for 10 days 1/31/18 2/10/18  ANTONIO Gallagher   fexofenadine (ALLEGRA) 180 MG tablet Take by mouth    Historical Provider, MD   galantamine (RAZADYNE) 4 mg tablet Take 4 mg by mouth daily  Historical Provider, MD   Multiple Vitamins-Minerals (CENTRUM SILVER PO) Take by mouth    Historical Provider, MD   sodium chloride (OCEAN) 0 65 % nasal spray 2 sprays into each nostril as needed for congestion for up to 30 days   9/24/16 10/24/16  Chuck Baez MD   traZODone (DESYREL) 50 mg tablet Take 50 mg by mouth daily at bedtime   9/27/17   Historical Provider, MD   Trolamine Salicylate (ASPERCREME) 10 % LOTN Apply topically    Historical Provider, MD   Cholecalciferol ([employer] VITAMIN D) 2000 units CAPS Take by mouth  2/10/18  Historical Provider, MD     I have reviewed home medications with patient family member  Allergies: Allergies   Allergen Reactions    Sulfa Antibiotics GI Intolerance     nausea    Celecoxib        Social History:     Marital Status:    Occupation:   Patient Pre-hospital Living Situation:  Assisted living facility  Patient Pre-hospital Level of Mobility:  Wheelchair-bound  Patient Pre-hospital Diet Restrictions:  None  Substance Use History:   History   Alcohol Use    Yes     Comment: social     History   Smoking Status    Former Smoker    Types: Cigarettes    Quit date: 4/11/1950   Smokeless Tobacco    Never Used     History   Drug Use No       Family History:    non-contributory    Physical Exam:     Vitals:   Blood Pressure: 96/50 (02/10/18 1730)  Pulse: 76 (02/10/18 1730)  Temperature: 98 4 °F (36 9 °C) (02/10/18 0940)  Temp Source: Oral (02/10/18 0940)  Respirations: 16 (02/10/18 1700)  Weight - Scale: 85 3 kg (188 lb) (02/10/18 1448)  SpO2: 96 % (02/10/18 1730)    Physical Exam     Gen -Patient comfortable in bed  Neck- Supple  No thyromegaly or lymphadenopathy  Lungs-decreased breath sounds bilaterally, no wheeze or rales  Heart S1-S2, irregular rate and rhythm, systolic murmur  Abdomen-soft nontender, no organomegaly  Bowel sounds present  Extremities-no cyanosi,  Clubbing; bilateral pitting leg edema present  Skin- no rash  Neuro-awake and alert         Additional Data:     Lab Results: I have personally reviewed pertinent reports          Results from last 7 days  Lab Units 02/10/18  1218   WBC Thousand/uL 11 84*   HEMOGLOBIN g/dL 9 1*   HEMATOCRIT % 28 9*   PLATELETS Thousands/uL 259   NEUTROS PCT % 80*   LYMPHS PCT % 13*   MONOS PCT % 7   EOS PCT % 0       Results from last 7 days  Lab Units 02/10/18  1218   SODIUM mmol/L 140   POTASSIUM mmol/L 4 0   CHLORIDE mmol/L 105   CO2 mmol/L 25   BUN mg/dL 23   CREATININE mg/dL 1 26   CALCIUM mg/dL 8 6   GLUCOSE RANDOM mg/dL 113       Results from last 7 days  Lab Units 02/10/18  1218   INR  1 80*       Imaging: I have personally reviewed pertinent reports  XR chest 1 view portable    (Results Pending)       EKG, Pathology, and Other Studies Reviewed on Admission:   · EKG:     Allscripts / Epic Records Reviewed: Yes     ** Please Note: This note has been constructed using a voice recognition system   **

## 2018-02-10 NOTE — SEPSIS NOTE
Sepsis Note   Lore Day 80 y o  male MRN: [id number]  Unit/Bed#: ED 14 Encounter: [id number]            Initial Sepsis Screening     Row Name 02/10/18 1400                Is the patient's history suggestive of a new or worsening infection? (!)  Yes (Proceed)  -MB        Suspected source of infection soft tissue  -MB        Are two or more of the following signs & symptoms of infection both present and new to the patient? No  -MB        Indicate SIRS criteria Tachycardia > 90 bpm  -MB        If the answer is yes to both questions, suspicion of sepsis is present  --        If severe sepsis is present AND tissue hypoperfusion perists in the hour after fluid resuscitation or lactate > 4, the patient meets criteria for SEPTIC SHOCK  --        Are any of the following organ dysfunction criteria present within 6 hours of suspected infection and SIRS criteria that are NOT considered to be chronic conditions?   --        Organ dysfunction  --        Date of presentation of severe sepsis  --        Time of presentation of severe sepsis  --        Tissue hypoperfusion persists in the hour after crystalloid fluid administration, evidenced, by either:  --        Was hypotension present within one hour of the conclusion of crystalloid fluid administration?  --        Date of presentation of septic shock  --        Time of presentation of septic shock  --          User Key  (r) = Recorded By, (t) = Taken By, (c) = Cosigned By    [address] Name Provider Type    JACINTA Tan MD Physician

## 2018-02-10 NOTE — ASSESSMENT & PLAN NOTE
Likely due to chronic diastolic CHF    Continue Lasix 40 mg daily  Monitor I&Os  Continue potassium repletion

## 2018-02-11 ENCOUNTER — APPOINTMENT (INPATIENT)
Dept: ULTRASOUND IMAGING | Facility: HOSPITAL | Age: 83
DRG: 593 | End: 2018-02-11
Payer: MEDICARE

## 2018-02-11 PROBLEM — D64.9 NORMOCYTIC ANEMIA: Status: ACTIVE | Noted: 2018-02-11

## 2018-02-11 PROBLEM — L89.150 DECUBITUS ULCER OF SACRAL REGION, UNSTAGEABLE (HCC): Status: ACTIVE | Noted: 2018-02-10

## 2018-02-11 LAB
ANION GAP SERPL CALCULATED.3IONS-SCNC: 7 MMOL/L (ref 4–13)
BUN SERPL-MCNC: 22 MG/DL (ref 5–25)
CALCIUM SERPL-MCNC: 8 MG/DL (ref 8.3–10.1)
CHLORIDE SERPL-SCNC: 107 MMOL/L (ref 100–108)
CO2 SERPL-SCNC: 27 MMOL/L (ref 21–32)
CREAT SERPL-MCNC: 1.09 MG/DL (ref 0.6–1.3)
ERYTHROCYTE [DISTWIDTH] IN BLOOD BY AUTOMATED COUNT: 16 % (ref 11.6–15.1)
FLUAV AG SPEC QL: NORMAL
FLUBV AG SPEC QL: NORMAL
GFR SERPL CREATININE-BSD FRML MDRD: 57 ML/MIN/1.73SQ M
GLUCOSE SERPL-MCNC: 105 MG/DL (ref 65–140)
HCT VFR BLD AUTO: 27.4 % (ref 36.5–49.3)
HGB BLD-MCNC: 8.4 G/DL (ref 12–17)
INR PPP: 2.15 (ref 0.86–1.16)
MAGNESIUM SERPL-MCNC: 2.1 MG/DL (ref 1.6–2.6)
MCH RBC QN AUTO: 25.3 PG (ref 26.8–34.3)
MCHC RBC AUTO-ENTMCNC: 30.7 G/DL (ref 31.4–37.4)
MCV RBC AUTO: 83 FL (ref 82–98)
PLATELET # BLD AUTO: 232 THOUSANDS/UL (ref 149–390)
PMV BLD AUTO: 9.7 FL (ref 8.9–12.7)
POTASSIUM SERPL-SCNC: 3.7 MMOL/L (ref 3.5–5.3)
PROTHROMBIN TIME: 24.8 SECONDS (ref 12.1–14.4)
RBC # BLD AUTO: 3.32 MILLION/UL (ref 3.88–5.62)
RSV B RNA SPEC QL NAA+PROBE: NORMAL
SODIUM SERPL-SCNC: 141 MMOL/L (ref 136–145)
WBC # BLD AUTO: 8.42 THOUSAND/UL (ref 4.31–10.16)

## 2018-02-11 PROCEDURE — 99232 SBSQ HOSP IP/OBS MODERATE 35: CPT | Performed by: NURSE PRACTITIONER

## 2018-02-11 PROCEDURE — 85027 COMPLETE CBC AUTOMATED: CPT | Performed by: INTERNAL MEDICINE

## 2018-02-11 PROCEDURE — 76870 US EXAM SCROTUM: CPT

## 2018-02-11 PROCEDURE — 80048 BASIC METABOLIC PNL TOTAL CA: CPT | Performed by: INTERNAL MEDICINE

## 2018-02-11 PROCEDURE — 83735 ASSAY OF MAGNESIUM: CPT | Performed by: INTERNAL MEDICINE

## 2018-02-11 PROCEDURE — 85610 PROTHROMBIN TIME: CPT | Performed by: INTERNAL MEDICINE

## 2018-02-11 RX ADMIN — FLECAINIDE ACETATE 50 MG: 50 TABLET ORAL at 08:17

## 2018-02-11 RX ADMIN — SENNOSIDES 17.2 MG: 8.6 TABLET, FILM COATED ORAL at 08:17

## 2018-02-11 RX ADMIN — PANTOPRAZOLE SODIUM 40 MG: 40 TABLET, DELAYED RELEASE ORAL at 08:18

## 2018-02-11 RX ADMIN — ACETAMINOPHEN 650 MG: 325 TABLET, FILM COATED ORAL at 23:06

## 2018-02-11 RX ADMIN — ACETAMINOPHEN 650 MG: 325 TABLET, FILM COATED ORAL at 09:50

## 2018-02-11 RX ADMIN — WARFARIN SODIUM 5 MG: 2.5 TABLET ORAL at 17:38

## 2018-02-11 RX ADMIN — TOLTERODINE TARTRATE 2 MG: 2 CAPSULE, EXTENDED RELEASE ORAL at 08:17

## 2018-02-11 RX ADMIN — GABAPENTIN 100 MG: 100 CAPSULE ORAL at 08:17

## 2018-02-11 RX ADMIN — FINASTERIDE 5 MG: 5 TABLET, FILM COATED ORAL at 21:47

## 2018-02-11 RX ADMIN — FUROSEMIDE 40 MG: 40 TABLET ORAL at 08:17

## 2018-02-11 RX ADMIN — TRAZODONE HYDROCHLORIDE 50 MG: 50 TABLET ORAL at 21:47

## 2018-02-11 RX ADMIN — PANTOPRAZOLE SODIUM 40 MG: 40 TABLET, DELAYED RELEASE ORAL at 17:38

## 2018-02-11 RX ADMIN — POTASSIUM CHLORIDE 20 MEQ: 20 SOLUTION ORAL at 08:17

## 2018-02-11 RX ADMIN — TAMSULOSIN HYDROCHLORIDE 0.4 MG: 0.4 CAPSULE ORAL at 17:38

## 2018-02-11 RX ADMIN — SENNOSIDES 17.2 MG: 8.6 TABLET, FILM COATED ORAL at 17:39

## 2018-02-11 NOTE — ASSESSMENT & PLAN NOTE
· Hemoglobin 8 4 today, does appear to be chronically low but  Is below baseline upon chart review  No overt signs of bleeding  Vitals stable     · Heme check stools  · Monitor for s/s bleeding, repeat CBC in AM

## 2018-02-11 NOTE — ASSESSMENT & PLAN NOTE
· Does not appear infected  · Unstageable, wound bed not able to be visualized secondary to eschar  Surrounding tissue blanchable     · Appreciate wound care consult  · Q 2 turn and reposition, offload pressure

## 2018-02-11 NOTE — PROGRESS NOTES
[facility]  Progress Note Simi Andrea 3/30/1920, 80 y o  male MRN: [id number]    Unit/Bed#: MS [facility] Encounter: [id number]    Primary Care Provider: Silverio Langston MD   Date and time admitted to hospital: 2/10/2018 10:40 AM    * Fever   Assessment & Plan    · Unclear etiology  Daughter reported a fever of 101 3 at living facility however has been afebrile here  No WBC count  · Urinalysis negative  Culture pending  BC  X 2 pending  Follow up result  · CXR official read pending however no obvious consolidations/infiltrates noted  · Influenza/RSV pending  · Patient on room air  Patient without urinary symptoms, abdominal pain, cough/SOB--although given patients baseline mental status ROS difficult to obtain  · Was recently treated as outpatient for left heel ulcer with surrounding cellulitis with 10 days oral keflex  On assessment, this ulcer seems to have resolved  Was also recently hospitalized and treated for PNA  · Continue to monitor vital signs/wbc/temperatures  Was given dose of cefepime and vanco in the ED  Continue to monitor off antibiotics for now  Decubitus ulcer of sacral region, unstageable [facility])   Assessment & Plan    · Does not appear infected  · Unstageable, wound bed not able to be visualized secondary to eschar  Surrounding tissue blanchable  · Appreciate wound care consult  · Q 2 turn and reposition, offload pressure  · Will order P500 mattress  Daughter is requesting that he have a "air mattress" at home as this is his 3rd wound  Will discuss with case management  Normocytic anemia   Assessment & Plan    · Hemoglobin 8 4 today, does appear to be chronically low but  Is below baseline upon chart review  No overt signs of bleeding  Vitals stable     · Heme check stools  · Monitor for s/s bleeding, repeat CBC in AM          Chronic atrial fibrillation ([facility])   Assessment & Plan    · Anticoagulated on warfarin, continue  · Rate controlled on Tambocor, continue  · INR 2 15 today, goal 2-3  · Monitor daily INR          Benign prostatic hyperplasia with lower urinary tract symptoms   Assessment & Plan    · Continue finasteride, detrol and tamsulosin  · Chronically incontinent        Bilateral leg edema   Assessment & Plan    · Chronic  ?  Diastolic heart failure  · Continue daily Lasix dose 40 mg   · Patient also with scrotal edema, new per patients daughter, with pain radiating in to the groin  · Will check scrotal ultrasound  · Monitor        Ambulatory dysfunction   Assessment & Plan    · Mostly wheelchair bound  · Lives at assisted living facility  Per daughter has been slowly declining over last several months  · Appreciate PT/OT recommendations  SSS (sick sinus syndrome) ([facility])   Assessment & Plan    · Status post pacemaker placement  · Outpatient follow-up/device Clinic, follows with Dr Pedro Dickerson          Pharmacologic: Warfarin (Coumadin)  Mechanical VTE Prophylaxis in Place: Yes    Patient Centered Rounds: I have performed bedside rounds with nursing staff today  Nellie Black    Discussions with Specialists or Other Care Team Provider: nursing    Education and Discussions with Family / Patient: patient and daughter     Time Spent for Care: 30 minutes  More than 50% of total time spent on counseling and coordination of care as described above  Current Length of Stay: 1 day(s)    Current Patient Status: Inpatient   Certification Statement: The patient will continue to require additional inpatient hospital stay due to monitor vitals, labs, PT/OT    Discharge Plan / Estimated Discharge Date: If remains afebrile/no concern for active infection  Pending PT/OT  Code Status: Level 3 - DNAR and DNI      Subjective:   Patient complains of some mild low back pain  He also complaints of "needles" in his scrotum and penis that radiates into left groin    Per daughter, this is new as well as edema of scrotum and penis  Denies any shortness of breath or chest pain  Denies any cough  Denies any dysuria  Denies any fevers or chills  Objective:     Vitals:   Temp (24hrs), Av 6 °F (37 °C), Min:98 °F (36 7 °C), Max:99 5 °F (37 5 °C)    HR:  [70-78] 75  Resp:  [16-19] 19  BP: ()/(49-59) 125/59  SpO2:  [90 %-97 %] 95 %  Body mass index is 29 03 kg/m²  Input and Output Summary (last 24 hours):     No intake or output data in the 24 hours ending 18 0949    Physical Exam:     Physical Exam   Constitutional: No distress  Appears weak and frail   HENT:   Head: Normocephalic and atraumatic  Eyes: Pupils are equal, round, and reactive to light  Neck: Normal range of motion  No JVD present  Cardiovascular: Normal rate, normal heart sounds and intact distal pulses  An irregularly irregular rhythm present  No murmur heard  Bilateral lower extremity edema noted, non pitting  Pulmonary/Chest: Effort normal  He has rales (LLL)  Abdominal: Soft  Bowel sounds are normal  He exhibits no distension  There is no tenderness  Genitourinary: Right testis shows swelling  Left testis shows swelling  Musculoskeletal: Normal range of motion  He exhibits edema  Neurological: He is alert  Oriented to person only  Skin: Skin is warm and dry  There is pallor  Right buttock unstageable pressure ulcer noted  Psychiatric: He has a normal mood and affect         Additional Data:     Labs:      Results from last 7 days  Lab Units 18  0453 02/10/18  1218   WBC Thousand/uL 8 42 11 84*   HEMOGLOBIN g/dL 8 4* 9 1*   HEMATOCRIT % 27 4* 28 9*   PLATELETS Thousands/uL 232 259   NEUTROS PCT %  --  80*   LYMPHS PCT %  --  13*   MONOS PCT %  --  7   EOS PCT %  --  0       Results from last 7 days  Lab Units 18  0454   SODIUM mmol/L 141   POTASSIUM mmol/L 3 7   CHLORIDE mmol/L 107   CO2 mmol/L 27   BUN mg/dL 22   CREATININE mg/dL 1 09   CALCIUM mg/dL 8 0*   GLUCOSE RANDOM mg/dL 105       Results from last 7 days  Lab Units 02/11/18  0454   INR  2 15*         Recent Cultures (last 7 days):           Last 24 Hours Medication List:     Current Facility-Administered Medications:  acetaminophen 650 mg Oral Q6H PRN Dea Zurita MD   calcium carbonate 1,000 mg Oral Daily PRN Dea Zurita MD   cycloSPORINE 1 drop Both Eyes BID Dea Zurita MD   docusate sodium 100 mg Oral BID PRN Dea Zurita MD   finasteride 5 mg Oral HS Dea Zurita MD   flecainide 50 mg Oral QAM Dea Zurita MD   furosemide 40 mg Oral QAM Dea Zurita MD   gabapentin 100 mg Oral Daily Dea Zurita MD   galantamine 4 mg Oral Daily Dea Zurita MD   ondansetron 4 mg Intravenous Q6H PRN Dea Zurita MD   pantoprazole 40 mg Oral BID AC Dea Zurita MD   potassium chloride 20 mEq Oral Daily Dea Zurita MD   senna 2 tablet Oral BID Dea Zurita MD   sodium chloride 2 spray Each Nare PRN Dea Zurita MD   tamsulosin 0 4 mg Oral Daily With Dinner Dea Zurita MD   tolterodine 2 mg Oral Daily Dea Zurita MD   traZODone 50 mg Oral HS Dea Zurita MD   warfarin 5 mg Oral Daily (warfarin) Alexis Morin MD        Today, Patient Was Seen By: ANTONIO Day    ** Please Note: Dragon 360 Dictation voice to text software may have been used in the creation of this document   **

## 2018-02-11 NOTE — ASSESSMENT & PLAN NOTE
· Status post pacemaker placement  · Outpatient follow-up/device Clinic, follows with Dr Ginna Braxton

## 2018-02-11 NOTE — PROGRESS NOTES
Pt's vitals are stable  Pt shows no signs of distress  No complaints at this time  Will continue to monitor

## 2018-02-11 NOTE — ASSESSMENT & PLAN NOTE
· Anticoagulated on warfarin, continue  · Rate controlled on Tambocor, continue  · INR 2 15 today, goal 2-3    · Monitor daily INR

## 2018-02-11 NOTE — ASSESSMENT & PLAN NOTE
· Unclear etiology  Daughter reported a fever of 101 3 at living facility however has been afebrile here  No WBC count  · Urinalysis negative  Culture pending  BC  X 2 pending  Follow up result  · CXR official read pending however no obvious consolidations/infiltrates noted  · Influenza/RSV pending  · Patient on room air  Patient without urinary symptoms, abdominal pain, cough/SOB--although given patients baseline mental status ROS difficult to obtain  · Was recently treated as outpatient for left heel ulcer with surrounding cellulitis with 10 days oral keflex  On assessment, this ulcer seems to have resolved  Was also recently hospitalized and treated for PNA  · Continue to monitor vital signs/wbc/temperatures  Was given dose of cefepime and vanco in the ED  Continue to monitor off antibiotics for now

## 2018-02-12 PROBLEM — T14.8XXA DEEP TISSUE INJURY: Status: ACTIVE | Noted: 2018-02-12

## 2018-02-12 LAB
ANION GAP SERPL CALCULATED.3IONS-SCNC: 7 MMOL/L (ref 4–13)
BASOPHILS # BLD AUTO: 0 THOUSANDS/ΜL (ref 0–0.1)
BASOPHILS NFR BLD AUTO: 0 % (ref 0–1)
BUN SERPL-MCNC: 18 MG/DL (ref 5–25)
CALCIUM SERPL-MCNC: 8.2 MG/DL (ref 8.3–10.1)
CHLORIDE SERPL-SCNC: 106 MMOL/L (ref 100–108)
CO2 SERPL-SCNC: 28 MMOL/L (ref 21–32)
CREAT SERPL-MCNC: 1.09 MG/DL (ref 0.6–1.3)
EOSINOPHIL # BLD AUTO: 0.08 THOUSAND/ΜL (ref 0–0.61)
EOSINOPHIL NFR BLD AUTO: 1 % (ref 0–6)
ERYTHROCYTE [DISTWIDTH] IN BLOOD BY AUTOMATED COUNT: 16 % (ref 11.6–15.1)
GFR SERPL CREATININE-BSD FRML MDRD: 57 ML/MIN/1.73SQ M
GLUCOSE SERPL-MCNC: 101 MG/DL (ref 65–140)
HCT VFR BLD AUTO: 27.8 % (ref 36.5–49.3)
HGB BLD-MCNC: 8.6 G/DL (ref 12–17)
INR PPP: 2.52 (ref 0.86–1.16)
LYMPHOCYTES # BLD AUTO: 1.18 THOUSANDS/ΜL (ref 0.6–4.47)
LYMPHOCYTES NFR BLD AUTO: 13 % (ref 14–44)
MCH RBC QN AUTO: 25.6 PG (ref 26.8–34.3)
MCHC RBC AUTO-ENTMCNC: 30.9 G/DL (ref 31.4–37.4)
MCV RBC AUTO: 83 FL (ref 82–98)
MONOCYTES # BLD AUTO: 0.57 THOUSAND/ΜL (ref 0.17–1.22)
MONOCYTES NFR BLD AUTO: 6 % (ref 4–12)
NEUTROPHILS # BLD AUTO: 7.28 THOUSANDS/ΜL (ref 1.85–7.62)
NEUTS SEG NFR BLD AUTO: 80 % (ref 43–75)
PLATELET # BLD AUTO: 255 THOUSANDS/UL (ref 149–390)
PMV BLD AUTO: 9.7 FL (ref 8.9–12.7)
POTASSIUM SERPL-SCNC: 3.9 MMOL/L (ref 3.5–5.3)
PROTHROMBIN TIME: 28.1 SECONDS (ref 12.1–14.4)
RBC # BLD AUTO: 3.36 MILLION/UL (ref 3.88–5.62)
SODIUM SERPL-SCNC: 141 MMOL/L (ref 136–145)
WBC # BLD AUTO: 9.11 THOUSAND/UL (ref 4.31–10.16)

## 2018-02-12 PROCEDURE — 99232 SBSQ HOSP IP/OBS MODERATE 35: CPT | Performed by: PHYSICIAN ASSISTANT

## 2018-02-12 PROCEDURE — 85025 COMPLETE CBC W/AUTO DIFF WBC: CPT | Performed by: NURSE PRACTITIONER

## 2018-02-12 PROCEDURE — 85610 PROTHROMBIN TIME: CPT | Performed by: INTERNAL MEDICINE

## 2018-02-12 PROCEDURE — 80048 BASIC METABOLIC PNL TOTAL CA: CPT | Performed by: NURSE PRACTITIONER

## 2018-02-12 RX ORDER — TRAMADOL HYDROCHLORIDE 50 MG/1
50 TABLET ORAL EVERY 6 HOURS PRN
Status: DISCONTINUED | OUTPATIENT
Start: 2018-02-12 | End: 2018-02-15 | Stop reason: HOSPADM

## 2018-02-12 RX ORDER — LIDOCAINE 50 MG/G
1 PATCH TOPICAL DAILY
Status: DISCONTINUED | OUTPATIENT
Start: 2018-02-12 | End: 2018-02-15 | Stop reason: HOSPADM

## 2018-02-12 RX ADMIN — WARFARIN SODIUM 5 MG: 2.5 TABLET ORAL at 17:22

## 2018-02-12 RX ADMIN — TRAZODONE HYDROCHLORIDE 50 MG: 50 TABLET ORAL at 21:30

## 2018-02-12 RX ADMIN — FLECAINIDE ACETATE 50 MG: 50 TABLET ORAL at 08:36

## 2018-02-12 RX ADMIN — LIDOCAINE 1 PATCH: 50 PATCH TOPICAL at 15:50

## 2018-02-12 RX ADMIN — PANTOPRAZOLE SODIUM 40 MG: 40 TABLET, DELAYED RELEASE ORAL at 17:21

## 2018-02-12 RX ADMIN — TOLTERODINE TARTRATE 2 MG: 2 CAPSULE, EXTENDED RELEASE ORAL at 08:36

## 2018-02-12 RX ADMIN — SENNOSIDES 17.2 MG: 8.6 TABLET, FILM COATED ORAL at 17:22

## 2018-02-12 RX ADMIN — ACETAMINOPHEN 650 MG: 325 TABLET, FILM COATED ORAL at 15:50

## 2018-02-12 RX ADMIN — FUROSEMIDE 40 MG: 40 TABLET ORAL at 08:36

## 2018-02-12 RX ADMIN — FINASTERIDE 5 MG: 5 TABLET, FILM COATED ORAL at 21:30

## 2018-02-12 RX ADMIN — COLLAGENASE SANTYL: 250 OINTMENT TOPICAL at 17:21

## 2018-02-12 RX ADMIN — POTASSIUM CHLORIDE 20 MEQ: 20 SOLUTION ORAL at 08:36

## 2018-02-12 RX ADMIN — TAMSULOSIN HYDROCHLORIDE 0.4 MG: 0.4 CAPSULE ORAL at 17:21

## 2018-02-12 RX ADMIN — PANTOPRAZOLE SODIUM 40 MG: 40 TABLET, DELAYED RELEASE ORAL at 06:06

## 2018-02-12 RX ADMIN — GABAPENTIN 100 MG: 100 CAPSULE ORAL at 08:36

## 2018-02-12 RX ADMIN — SENNOSIDES 17.2 MG: 8.6 TABLET, FILM COATED ORAL at 08:37

## 2018-02-12 NOTE — ASSESSMENT & PLAN NOTE
· Decubitus ulcer, unstageable, POA, per charting in H&P  · Wound bed not able to be visualized secondary to eschar  · Wound care consulted  Recommended surgery consult for possible debridement  · Surgery consulted, discussed with Dr Heath Contreras  Recommending collagenase dressing to wound and no surgical debridement at this time  · Continue local wound care  · P-500 mattress in place given multiple wounds  · Frequent repositioning

## 2018-02-12 NOTE — CONSULTS
H&P Exam - General Surgery   Lore Day 80 y o  male MRN: [id number]  Unit/Bed#: -01 Encounter: [id number]    Assessment/Plan     Assessment:  1  Unstageable left buttock/sacrum pressure ulcer    -Unable to assess depth of ulcer due to firmly adhered eschar over center of wound  Wound moist secondary to placement of dressings  Scant drainage noted on dressing with no odor or purulence  Sloughing tissue previously noted in wound care evaluation cleaned off  Moderate induration around margins of wound with no palpable area of fluctuance and blanchable redness    -Patient afebrile since admission  WBC 9 11  Preliminary urine culture shows proteus species  Blood cultures no growth  RSV and Flu negative  Plan:  1  Surgical debridement does not seem necessary at this time given lack of purulence and fluctuance over pressure ulcer with resolution of leukocytosis  Patient afebrile since admission  Will discuss with attending  2  Continue local wound care per wound management recommendations    History of Present Illness     HPI:  Lore Day is a 80 y o  male with a history of multiple pressure wounds who presents with an unstageable left buttock/sacrum pressure ulcer  Patient lives in assisted living facility and was found to have a fever on 101 3 and was brought to the ED for evaluation  Patient was afebrile in the Ed, CXR and UA were negative, and mild leukocytosis of 11 84 was noted  The patient was given one dose of cefepime and vancomycin in Ed  Blood and urine cultures were obtained and patient was admitted for further fever workup and treatment  During examination, a large unstageable pressure ulcer on left buttock/sacrum was noted and surgery was consulted for evaluation of possible debridement of ulcer  Currently, patient is no longer on any antibiotics  He notes a mild cough with some phlegm production but has no other acute complaints   He denies any fevers, chills, shortness of breath, or chest pain  He has been afebrile since admission  Leukocytosis has since resolved  Influenza and RSV panel were negative  Preliminary blood culture results show no growth after 24 hours  Preliminary urine culture results show growth of proteus species  When talking with patient, he notes that he has had this wound for many months and it has progressively worsened  He states he does see wound care regularly but "they don't do anything " Patient records state he previously saw wound care approximately one week ago for a pressure ulcer on his left heel  Prior to this, his last visit for his left buttock/sacral wound was November 2017  Patient is unable to recall any further medical history or treatments regarding his pressure ulcer  Review of Systems   Constitutional: Positive for fever  Negative for activity change, appetite change, chills, diaphoresis and unexpected weight change  HENT: Negative for congestion, dental problem, drooling, facial swelling, hearing loss, rhinorrhea, sneezing, sore throat, tinnitus and trouble swallowing  Eyes: Negative for photophobia, discharge, itching and visual disturbance  Respiratory: Positive for cough  Negative for apnea, choking, chest tightness, shortness of breath, wheezing and stridor  Cardiovascular: Negative for chest pain and palpitations  Gastrointestinal: Negative for abdominal distention, abdominal pain, nausea, rectal pain and vomiting  Genitourinary: Positive for difficulty urinating and testicular pain  Patient has history of urinary incontinence    Musculoskeletal: Positive for back pain  Negative for joint swelling and myalgias  Skin: Positive for color change and wound  Neurological: Negative for dizziness, tremors, syncope, facial asymmetry and weakness         Historical Information   Past Medical History:   Diagnosis Date    Atrial fibrillation ([facility] [address] )     BPH with obstruction/lower urinary tract symptoms     Coronary artery disease     Depression     DVT (deep venous thrombosis) (HCC)     Fracture of radius and ulna, distal     History of compression fracture of spine     Organic impotence     Pacemaker     S/P IVC filter     SSS (sick sinus syndrome) (HCC)     Venous insufficiency (chronic) (peripheral)      Past Surgical History:   Procedure Laterality Date    APPENDECTOMY      CHOLECYSTECTOMY      CYSTOSCOPY  2007    HIP SURGERY      ,      Social History   History   Alcohol Use    Yes     Comment: social     History   Drug Use No     History   Smoking Status    Former Smoker    Types: Cigarettes    Quit date: 1950   Smokeless Tobacco    Never Used     Family History:   Family History   Problem Relation Age of Onset    Heart disease Brother     Cancer Family     Heart disease Family     Stroke Family        Meds/Allergies   PTA meds:   Prior to Admission Medications   Prescriptions Last Dose Informant Patient Reported? Taking? Multiple Vitamins-Minerals (CENTRUM SILVER PO)   Yes No   Sig: Take by mouth   Trolamine Salicylate (ASPERCREME) 10 % LOTN   Yes No   Sig: Apply topically   cephalexin (KEFLEX) 500 mg capsule   No No   Sig: Take 1 capsule (500 mg total) by mouth every 12 (twelve) hours for 10 days   cholecalciferol (VITAMIN D3) 1,000 units tablet   Yes Yes   Sig: Take 1,000 Units by mouth 2 (two) times a day   cycloSPORINE (RESTASIS) 0 05 % ophthalmic emulsion  Child Yes Yes   Sig: Administer 1 drop to both eyes 2 (two) times a day     famciclovir (FAMVIR) 125 MG tablet  Child Yes Yes   Sig: Take 125 mg by mouth every morning     fexofenadine (ALLEGRA) 180 MG tablet   Yes No   Sig: Take by mouth   finasteride (PROSCAR) 5 mg tablet  Child Yes Yes   Sig: Take 5 mg by mouth daily at bedtime     flecainide (TAMBOCOR) 50 mg tablet  Child Yes Yes   Sig: Take 50 mg by mouth every morning     fluticasone (FLONASE) 50 mcg/act nasal spray   Yes Yes   Si spray into each nostril 2 (two) times a day as needed for rhinitis   furosemide (LASIX) 40 mg tablet   No Yes   Sig: Take 1 tablet (40 mg total) by mouth every morning   gabapentin (NEURONTIN) 100 mg capsule  Child Yes Yes   Sig: Take 100 mg by mouth daily   galantamine (RAZADYNE) 4 mg tablet  Child Yes No   Sig: Take 4 mg by mouth daily  loratadine (CLARITIN) 10 mg tablet   Yes Yes   Sig: Take 10 mg by mouth daily   oxyCODONE-acetaminophen (PERCOCET) 2 5-325 MG per tablet   No Yes   Sig: Take 1 tablet by mouth every 6 (six) hours as needed for moderate pain Max Daily Amount: 4 tablets   pantoprazole (PROTONIX) 40 mg tablet  Child Yes Yes   Sig: Take 40 mg by mouth 2 (two) times a day     polyethylene glycol (MIRALAX) 17 g packet   Yes Yes   Sig: Take 17 g by mouth daily as needed   potassium chloride (MICRO-K) 10 MEQ CR capsule   No Yes   Sig: Take 1 capsule (10 mEq total) by mouth daily   senna (SENOKOT) 8 6 mg   No Yes   Sig: Take 2 tablets by mouth 2 (two) times a day   sodium chloride (OCEAN) 0 65 % nasal spray   No No   Si sprays into each nostril as needed for congestion for up to 30 days  solifenacin (VESICARE) 5 mg tablet  Child Yes Yes   Sig: Take 5 mg by mouth daily at bedtime     tamsulosin (FLOMAX) 0 4 mg  Child Yes Yes   Sig: Take 0 4 mg by mouth daily with dinner     traZODone (DESYREL) 50 mg tablet  Child Yes No   Sig: Take 50 mg by mouth daily at bedtime     warfarin (COUMADIN) 5 mg tablet   No Yes   Si mg for 3 days and then 2 5 mg every 4th day      Facility-Administered Medications: None     Allergies   Allergen Reactions    Sulfa Antibiotics GI Intolerance     nausea    Celecoxib        Objective   First Vitals:   Blood Pressure: 111/56 (02/10/18 0940)  Pulse: 94 (02/10/18 0940)  Temperature: 98 4 °F (36 9 °C) (02/10/18 0940)  Temp Source: Oral (02/10/18 0940)  Respirations: 16 (02/10/18 0940)  Height: 5' 8" (172 7 cm) (02/10/18 0940)  Weight - Scale: 85 3 kg (188 lb) (02/10/18 1448)  SpO2: 96 % (02/10/18 0940)    Current Vitals:   Blood Pressure: 123/54 (02/12/18 0713)  Pulse: 78 (02/12/18 0713)  Temperature: 97 8 °F (36 6 °C) (02/12/18 0713)  Temp Source: Oral (02/12/18 0713)  Respirations: 18 (02/12/18 0713)  Height: 5' 8" (172 7 cm) (02/10/18 0940)  Weight - Scale: 87 kg (191 lb 12 8 oz) (02/12/18 0603)  SpO2: 94 % (02/12/18 0713)      Intake/Output Summary (Last 24 hours) at 02/12/18 1303  Last data filed at 02/12/18 0017   Gross per 24 hour   Intake              220 ml   Output              200 ml   Net               20 ml       Invasive Devices     Peripheral Intravenous Line            Peripheral IV 02/10/18 Right Antecubital 2 days                Physical Exam   Constitutional: He is oriented to person, place, and time  He is active and cooperative  No distress  HENT:   Head: Normocephalic and atraumatic  Mouth/Throat: No oropharyngeal exudate  Eyes: Conjunctivae are normal  Pupils are equal, round, and reactive to light  Neck: Normal range of motion  Neck supple  No tracheal deviation present  No thyromegaly present  Cardiovascular: Intact distal pulses  Exam reveals no gallop and no friction rub  Murmur heard  Irregular rate and rhythm, systolic murmur   Pulmonary/Chest: Effort normal and breath sounds normal  No respiratory distress  He has no wheezes  He has no rales  He exhibits no tenderness  Abdominal: Soft  Bowel sounds are normal  He exhibits no distension  There is no tenderness  There is no rebound and no guarding  Musculoskeletal: He exhibits edema  Neurological: He is alert and oriented to person, place, and time  Skin: Skin is warm  No rash noted  He is not diaphoretic  There is erythema  No pallor  Approximately 5cm large pressure ulcer on left buttock/sacrum covered with dressing  Wound moist secondary to dressing  Fixed black eschar over body of wound  No palpable area of fluctuance or purulent drainage  Margins of wound indurated and red   Miminal drainage over dressing with no foul odor          Lab Results: I have personally reviewed pertinent lab results      Recent Results (from the past 36 hour(s))   CBC (With Platelets)    Collection Time: 02/11/18  4:53 AM   Result Value Ref Range    WBC 8 42 4 31 - 10 16 Thousand/uL    RBC 3 32 (L) 3 88 - 5 62 Million/uL    Hemoglobin 8 4 (L) 12 0 - 17 0 g/dL    Hematocrit 27 4 (L) 36 5 - 49 3 %    MCV 83 82 - 98 fL    MCH 25 3 (L) 26 8 - 34 3 pg    MCHC 30 7 (L) 31 4 - 37 4 g/dL    RDW 16 0 (H) 11 6 - 15 1 %    Platelets 641 793 - 438 Thousands/uL    MPV 9 7 8 9 - 12 7 fL   Basic metabolic panel    Collection Time: 02/11/18  4:54 AM   Result Value Ref Range    Sodium 141 136 - 145 mmol/L    Potassium 3 7 3 5 - 5 3 mmol/L    Chloride 107 100 - 108 mmol/L    CO2 27 21 - 32 mmol/L    Anion Gap 7 4 - 13 mmol/L    BUN 22 5 - 25 mg/dL    Creatinine 1 09 0 60 - 1 30 mg/dL    Glucose 105 65 - 140 mg/dL    Calcium 8 0 (L) 8 3 - 10 1 mg/dL    eGFR 57 ml/min/1 73sq m   Magnesium    Collection Time: 02/11/18  4:54 AM   Result Value Ref Range    Magnesium 2 1 1 6 - 2 6 mg/dL   Protime-INR    Collection Time: 02/11/18  4:54 AM   Result Value Ref Range    Protime 24 8 (H) 12 1 - 14 4 seconds    INR 2 15 (H) 0 86 - 1 16   Protime-INR    Collection Time: 02/12/18  4:42 AM   Result Value Ref Range    Protime 28 1 (H) 12 1 - 14 4 seconds    INR 2 52 (H) 0 86 - 9 62   Basic metabolic panel    Collection Time: 02/12/18  4:42 AM   Result Value Ref Range    Sodium 141 136 - 145 mmol/L    Potassium 3 9 3 5 - 5 3 mmol/L    Chloride 106 100 - 108 mmol/L    CO2 28 21 - 32 mmol/L    Anion Gap 7 4 - 13 mmol/L    BUN 18 5 - 25 mg/dL    Creatinine 1 09 0 60 - 1 30 mg/dL    Glucose 101 65 - 140 mg/dL    Calcium 8 2 (L) 8 3 - 10 1 mg/dL    eGFR 57 ml/min/1 73sq m   CBC and differential    Collection Time: 02/12/18  4:42 AM   Result Value Ref Range    WBC 9 11 4 31 - 10 16 Thousand/uL    RBC 3 36 (L) 3 88 - 5 62 Million/uL    Hemoglobin 8 6 (L) 12 0 - 17 0 g/dL    Hematocrit 27 8 (L) 36 5 - 49 3 %    MCV 83 82 - 98 fL    MCH 25 6 (L) 26 8 - 34 3 pg    MCHC 30 9 (L) 31 4 - 37 4 g/dL    RDW 16 0 (H) 11 6 - 15 1 %    MPV 9 7 8 9 - 12 7 fL    Platelets 709 930 - 238 Thousands/uL    Neutrophils Relative 80 (H) 43 - 75 %    Lymphocytes Relative 13 (L) 14 - 44 %    Monocytes Relative 6 4 - 12 %    Eosinophils Relative 1 0 - 6 %    Basophils Relative 0 0 - 1 %    Neutrophils Absolute 7 28 1 85 - 7 62 Thousands/µL    Lymphocytes Absolute 1 18 0 60 - 4 47 Thousands/µL    Monocytes Absolute 0 57 0 17 - 1 22 Thousand/µL    Eosinophils Absolute 0 08 0 00 - 0 61 Thousand/µL    Basophils Absolute 0 00 0 00 - 0 10 Thousands/µL       Imaging: I have personally reviewed pertinent reports  CXR 2/10/18 -   1  Left retrocardiac moderate size hiatal hernia with probable adjacent chronic scarring and subsegmental atelectasis  Cannot exclude adjacent acute infiltrate  2  Overall, appearance is unchanged compared to the frontal view of the prior radiograph  CXR 9/24/16-  1  Partially obscured left hemidiaphragm suggestive of a small left pleural effusion and/or left lower lobe atelectasis/infiltrate  2  No significant pulmonary vascular congestion        EKG, Pathology, and Other Studies: I have personally reviewed pertinent reports

## 2018-02-12 NOTE — PLAN OF CARE
Problem: DISCHARGE PLANNING - CARE MANAGEMENT  Goal: Discharge to post-acute care or home with appropriate resources  INTERVENTIONS:  - Conduct assessment to determine patient/family and health care team treatment goals, and need for post-acute services based on payer coverage, community resources, and patient preferences, and barriers to discharge  - Address psychosocial, clinical, and financial barriers to discharge as identified in assessment in conjunction with the patient/family and health care team  - Arrange appropriate level of post-acute services according to patients   needs and preference and payer coverage in collaboration with the physician and health care team  - Communicate with and update the patient/family, physician, and health care team regarding progress on the discharge plan  - Arrange appropriate transportation to post-acute venues  Outcome: Progressing  CORRY spoke with patient's daughter Joaquín Francis about hospice referral   She has already been in touch with  Hospice so CM has sent the referral through [place]  Patient's daughter reported that she has an agency she is working with and is unsure how quickly she will be able to get the 24 hour care in place  She is sure patient will need an air mattress at discharge  CM explained that hospice would assist with getting all necessary equipment ordered and delivered prior to discharge and that once hospice has accepted him to anticipate discharge soon after as long as he is medically stable  CM dept will continue to follow

## 2018-02-12 NOTE — CASE MANAGEMENT
Initial Clinical Review    Admission: Date/Time/Statement: 2/10/18 @ 1420     Orders Placed This Encounter   Procedures    Inpatient Admission (expected length of stay for this patient is greater than two midnights)     Standing Status:   Standing     Number of Occurrences:   1     Order Specific Question:   Admitting Physician     Answer:   Tiffany Doctor     Order Specific Question:   Level of Care     Answer:   Med Surg [16]     Order Specific Question:   Estimated length of stay     Answer:   More than 2 Midnights     Order Specific Question:   Certification     Answer:   I certify that inpatient services are medically necessary for this patient for a duration of greater than two midnights  See H&P and MD Progress Notes for additional information about the patient's course of treatment  ED: Date/Time/Mode of Arrival:   ED Arrival Information     Expected Arrival Acuity Means of Arrival Escorted By Service Admission Type    - 2/10/2018 08:35 Urgent Walk-In Self General Medicine Urgent    Arrival Complaint    FEVER          Chief Complaint:   Chief Complaint   Patient presents with    Fever - 75 years or older     pts daughter reports 101 4 at [facility] this morning  pt reports b/l groin and flank pain since tuesday  hx of UTIs  History of Illness: 80 y o  male who presents with fever, temperature 101 3° F at assisted living facility  According to his daughter he had temperature of 101 3° at assisted living facility, subsequently brought to the emergency department for evaluation  Patient reports mild cough, but no phlegm  Denies any chest pain or shortness of breath  Limited mobility, mostly spends time in chair or bed  He has pressure ulcer in sacral region      ED Vital Signs:   ED Triage Vitals   Temperature Pulse Respirations Blood Pressure SpO2   02/10/18 0940 02/10/18 0940 02/10/18 0940 02/10/18 0940 02/10/18 0940   98 4 °F (36 9 °C) 94 16 111/56 96 %      Temp Source Heart Rate Source Patient Position - Orthostatic VS BP Location FiO2 (%)   02/10/18 0940 02/10/18 0940 02/10/18 1541 02/10/18 1421 --   Oral Monitor Lying Left arm       Pain Score       02/10/18 0940       5        Wt Readings from Last 1 Encounters:   02/12/18 87 kg (191 lb 12 8 oz)       Vital Signs (abnormal): Low BP 98/49    Abnormal Labs/Diagnostic Test Results:   Urine culture 60,000- 02721 proteus   INR 1 80  Wbc 11 84   hgb 9 1, hct 28 9    CxR - Left retrocardiac moderate size hiatal hernia with probable adjacent chronic scarring and subsegmental atelectasis   Cannot exclude adjacent acute infiltrate  Labs 2/12-  INR 2 52  Calcium 8 2  hgb 8 6, hct 27 8  ED Treatment:   Medication Administration from 02/10/2018 0835 to 02/10/2018 1754       Date/Time Order Dose Route Action Action by Comments     02/10/2018 1305 acetaminophen (TYLENOL) tablet 650 mg 650 mg Oral Given Jayla Monroy RN      02/10/2018 1319 sodium chloride 0 9 % bolus 500 mL 0 mL Intravenous Stopped Jayla Monroy RN      02/10/2018 1219 sodium chloride 0 9 % bolus 500 mL 500 mL Intravenous Brady 37 Faizan Yeh RN      02/10/2018 1306 oxyCODONE (ROXICODONE) IR tablet 5 mg 5 mg Oral Given Jayla Monroy RN      02/10/2018 1605 vancomycin (VANCOCIN) 1,250 mg in sodium chloride 0 9 % 250 mL IVPB 1,250 mg Intravenous 1818 [facility] Weston Ibrahim, RN      02/10/2018 1604 cefepime (MAXIPIME) 2 g/50 mL dextrose IVPB 0 mg Intravenous Stopped Jayla Monroy RN      02/10/2018 1534 cefepime (MAXIPIME) 2 g/50 mL dextrose IVPB 2,000 mg Intravenous New Bag Adelina Weston Ibrahim, RN           Past Medical/Surgical History:    Active Ambulatory Problems     Diagnosis Date Noted    CAP (community acquired pneumonia) 04/11/2016    Benign prostatic hyperplasia with lower urinary tract symptoms 04/11/2016    SSS (sick sinus syndrome) ([facility] [address] ) 04/11/2016    [facility] 04/11/2016    S/P cardiac pacemaker procedure 04/11/2016    GERD (gastroesophageal reflux disease) 04/11/2016    Ambulatory dysfunction 04/11/2016    Sinus infection 09/23/2016    Bilateral leg edema 09/23/2016    Edema 01/31/2018    Decubitus ulcer of left heel 01/31/2018    Cellulitis and abscess of foot 01/31/2018     Resolved Ambulatory Problems     Diagnosis Date Noted    Deep vein thrombosis (DVT) of left lower extremity ([facility] [address] ) 71/42/7899    Metabolic encephalopathy 41/07/9650     Past Medical History:   Diagnosis Date    [facility] fibrillation ([facility])     BPH with obstruction/lower urinary tract symptoms     Coronary artery disease     Depression     DVT (deep venous thrombosis) ([facility])     Fracture of radius and ulna, distal     History of compression fracture of spine     Organic impotence     Pacemaker     S/P IVC filter     SSS (sick sinus syndrome) ([facility])     Venous insufficiency (chronic) (peripheral)        Admitting Diagnosis: Fever [R50 9]  Cellulitis of sacral region [L03 319]  Sacral decubitus ulcer, stage II [L89 152]    Age/Sex: 80 y o  male    Assessment/Plan:   Fever   Assessment & Plan     Unclear etiology, afebrile in ED        Chest x-ray negative  UA negative  Obtain influenza RSV PCR                   Decubitus ulcer of sacral region, stage 2   Assessment & Plan     Does not look infected  Will consult wound management  Continue barrier cream          Bilateral leg edema   Assessment & Plan     Likely due to chronic diastolic CHF     Continue Lasix 40 mg daily  Monitor I&Os  Continue potassium repletion          Ambulatory dysfunction   Assessment & Plan     Mostly wheelchair bound  Lives at assisted living facility          Chronic atrial fibrillation ([facility])   Assessment & Plan     Anticoagulated on warfarin  Slightly subtherapeutic  Check daily INR             SSS (sick sinus syndrome) ([facility])   Assessment & Plan     Status post pacemaker placement  Outpatient follow-up/device Clinic          Benign prostatic hyperplasia with lower urinary tract symptoms   Assessment & Plan     Continue finasteride and tamsulosin  Chronically incontinent         Admission Orders:  2/10/2018  1420 INPATIENT   Scheduled Meds:   Current Facility-Administered Medications:  acetaminophen 650 mg Oral Q6H PRN Dea Zurita MD   calcium carbonate 1,000 mg Oral Daily PRN Dea Zurita MD   cycloSPORINE 1 drop Both Eyes BID Dea Zurita MD   docusate sodium 100 mg Oral BID PRN Dea Zuirta MD   finasteride 5 mg Oral HS Dea Zurita MD   flecainide 50 mg Oral QAM Dea Zurita MD   furosemide 40 mg Oral QAM Dea Zurita MD   gabapentin 100 mg Oral Daily Dea Zurita MD   galantamine 4 mg Oral Daily Dea Zurita MD   ondansetron 4 mg Intravenous Q6H PRN Dea Zurita MD   pantoprazole 40 mg Oral BID AC Dea Zurita MD   potassium chloride 20 mEq Oral Daily Dea Zurita MD   senna 2 tablet Oral BID Dea Zurita MD   sodium chloride 2 spray Each Nare PRN Dea Zurita MD   tamsulosin 0 4 mg Oral Daily With Dinner Dea Zurita MD   tolterodine 2 mg Oral Daily Dea Zurita MD   traZODone 50 mg Oral HS Dea Zurita MD   warfarin 5 mg Oral Daily (warfarin) Dea Zurita MD     Continuous Infusions:    PRN Meds:   acetaminophen    calcium carbonate    docusate sodium    ondansetron    sodium chloride      hydraguard to sacrum, buttocks and heels bid  Wound care daily - Cleanse unstageable pressure injury to L buttock with NSS, pat dry, apply silvasorb gel to adaptic and place on wound bed cover with ABD pad daily and as needed for soilage  scds  Stool occult blood  Blood cultures     Consult surgery  Fluid restriction  PT/OT

## 2018-02-12 NOTE — ASSESSMENT & PLAN NOTE
· Patient reportedly mostly wheelchair bound  · Resides at [address] [place] [address] living [facility]  · Spoke with patient's daughter who is interested in home hospice  Hospice consulted  · PT/OT consulted

## 2018-02-12 NOTE — ASSESSMENT & PLAN NOTE
· Anticoagulated jada warfarin  · INR 2 52 today  Goal INR 2-3  · Continue current dose of Coumadin, 5 mg daily  · HR controlled on flecanide

## 2018-02-12 NOTE — ASSESSMENT & PLAN NOTE
· Uncertain etiology  · Daughter reported a fever of 101 3 at living facility  · Patient remains afebrile since admission and without evidence of leukocytosis or tachycardia  Continue to monitor VS and WBC  · Received 1 dose of IV cefepime and vancomycin on presentation to ED  · Continue to monitor off IV antibiotics  · Urine culture growing proteus species  Patient with chronic incontinence  · Discussed with attending  Possible colonization  Monitor off antibiotics  · Blood cultures x 2: negative thus far  · CXR: Left retrocardiac moderate size hiatal hernia with probable adjacent chronic scarring and subsegmental atelectasis  Cannot exclude adjacent acute infiltrate  · US of scrotum, testicles obtained given swelling: Small bilateral hydroceles with scrotal wall edema  · Elevate area  · Influenza/RSV negative  · Of note, patient recently treated as outpatient for left heel ulcer with cellulitis with 10 days p o  Keflex and was also recently hospitalized, treated for PNA

## 2018-02-12 NOTE — CONSULTS
Progress Note - Wound   Naomi Khan 80 y o  male MRN: [id number]  Unit/Bed#: -01 Encounter: [id number]      Assessment:  Patient is 80year old male who presents and is admitted with fever  Patient has a history of - a-fib, BPH, CAD, DVT, depression, pacer, SSS, PVD  Wound care consulted for pressure injury noted on admission  Patient cooperative with assessment  Incontinent of bowel and bladder at times per nursing  Assist of 2 with turning - dependent with care  Patient seen on P-500 mattress  Darlene-care rendered at time of assessment  Nutrition is following along with patient  R heel is intact with no redness noted  No wounds  L heel with blanchable redness and dry peeling noted  No open wounds noted  Recommend offloading of the heels and apply hydraguard cream      R buttock with DTI  This wound was not noted captured on admission  Wound is intact non-blanchable purple skin  No open areas, wound is non-evolving at time of assessment  No induration, fluctuance, drainage or redness noted  This wound has the potential to evolve to a full thickness wound, stage 3 or 4  Recommend offloading of pressure and hydraguard cream      L lower buttock with small stage 2 pressure injury  This wound was not noted on admission  Wound bed is round and beefy red appearing and dry  Edge is attached intact with no maceration  Darlene-wound is intact with no redness, induration or fluctuance noted  Recommend offloading of pressure and hydraguard cream     L buttock/sacrum with unstageable pressure injury  This wound was noted on admission  Unable to assess depth of wound bed as the wound is obscured by slough - suspect full thickness wound  Wound bed is 75% dry adhered yellow/black colored slough Jhonny Jamison  25% beefy red tissue  Scant drainage noted  Foul odor present  No purulence noted  No unroofing noted of eschar in wound bed  Edge is attached intact with no maceration noted   Darlene-wound is intact with some increased warmth, induration in some areas and redness which is blanchable  No fluctuance noted  Recommend silvasorb gel / adaptic to soften this eschar, and give moisture to the wound bed, and cover with dry dressing daily, and as needed since patient is incontinent at times  New dressing placed  Discussed with RADHA Dougherty regarding assessment findings as patient is admitted with unknown infection source  Recommend surgery consult for potential debridement /assessment  Primary nurse at bedside and aware of plan of care/ assessment  Agree with P-500 mattress which was ordered by primary team  See flow sheets for more detailed assessment findings  Wound care will follow along weekly  Plan:   1  Apply hydraguard to b/l buttocks, sacrum and heels BID and PRN for prevention and protection  2  Apply skin nourishing cream to the skin daily for moisture   3  Elevate heels off of the bed with pillows to offload pressure   4  Turn and reposition every 2 hours   5  Soft care cushion to chair when OOB   6  Continue P500 mattress  7  Cleanse unstageable pressure injury to L buttock with NSS, pat dry, apply silvasorb gel to adaptic and place on wound bed cover with ABD pad daily and as needed for soilage  8  Consider acute surgery consult for debridement   9  Wound care will follow along with patient weekly, please call with questions or concerns [phone]       Objective:    Vitals: Blood pressure 123/54, pulse 78, temperature 97 8 °F (36 6 °C), temperature source Oral, resp  rate 18, height 5' 8" (1 727 m), weight 87 kg (191 lb 12 8 oz), SpO2 94 %  ,Body mass index is 29 16 kg/m²  Pressure Ulcer 02/10/18 Buttocks Left (Active)   Staging Unstagable 2/12/2018  9:00 AM   Wound Description Yellow;Slough;Black; Beefy red 2/12/2018  9:00 AM   Darlene-wound Assessment Dry; Intact; Erythema 2/12/2018  9:00 AM   Wound Length (cm) 7 cm 2/12/2018  9:00 AM   Wound Width (cm) 9 cm 2/12/2018  9:00 AM   Calculated Wound Area (cm^2) 63 cm^2 2/12/2018 9:00 AM   Drainage Amount Scant 2/12/2018  9:00 AM   Drainage Description Serosanguineous 2/12/2018  9:00 AM   Treatment Cleansed;Elevated with pillow(s); Offload;Softcare cushion;Turn & reposition 2/12/2018  9:00 AM   Dressing Silvasorb gel;Dry dressing 2/12/2018  9:00 AM   Dressing Changed New dressing applied 2/12/2018  9:00 AM   Wound packed? No 2/12/2018  9:00 AM   Packing- # removed 0 2/12/2018  9:00 AM   Packing- # inserted 0 2/12/2018  9:00 AM   Patient Tolerance Tolerated well 2/12/2018  9:00 AM   Dressing Status Clean;Dry; Intact 2/12/2018  9:00 AM       Pressure Ulcer 02/12/18 Buttocks Left (Active)   Staging Stage II 2/12/2018  9:00 AM   Wound Description Beefy red 2/12/2018  9:00 AM   Darlene-wound Assessment Clean;Dry; Intact 2/12/2018  9:00 AM   Shape round 2/12/2018  9:00 AM   Wound Length (cm) 0 5 cm 2/12/2018  9:00 AM   Wound Width (cm) 0 5 cm 2/12/2018  9:00 AM   Wound Depth (cm) 0 1 2/12/2018  9:00 AM   Calculated Wound Area (cm^2) 0 25 cm^2 2/12/2018  9:00 AM   Calculated Wound Volume (cm^3) 0 02 cm^3 2/12/2018  9:00 AM   Drainage Amount None 2/12/2018  9:00 AM   Treatment Cleansed;Elevated with pillow(s); Offload;Softcare cushion;Turn & reposition 2/12/2018  9:00 AM   Dressing Moisture barrier 2/12/2018  9:00 AM   Wound packed? No 2/12/2018  9:00 AM   Packing- # removed 0 2/12/2018  9:00 AM   Packing- # inserted 0 2/12/2018  9:00 AM   Patient Tolerance Tolerated well 2/12/2018  9:00 AM   Dressing Status Open to air 2/12/2018  9:00 AM       Pressure Ulcer 02/12/18 Buttocks Right (Active)   Staging Deep Tissue Injury 2/12/2018  9:00 AM   Wound Description Light purple; Intact 2/12/2018  9:00 AM   Darlene-wound Assessment Clean;Dry; Intact 2/12/2018  9:00 AM   Shape round 2/12/2018  9:00 AM   Wound Length (cm) 2 cm 2/12/2018  9:00 AM   Wound Width (cm) 2 cm 2/12/2018  9:00 AM   Wound Depth (cm) 0 2/12/2018  9:00 AM   Calculated Wound Area (cm^2) 4 cm^2 2/12/2018  9:00 AM   Calculated Wound Volume (cm^3) 0 cm^3 2/12/2018  9:00 AM   Drainage Amount None 2/12/2018  9:00 AM   Treatment Cleansed;Elevated with pillow(s); Offload;Softcare cushion;Turn & reposition 2/12/2018  9:00 AM   Dressing Moisture barrier 2/12/2018  9:00 AM   Wound packed? No 2/12/2018  9:00 AM   Packing- # removed 0 2/12/2018  9:00 AM   Packing- # inserted 0 2/12/2018  9:00 AM   Patient Tolerance Tolerated well 2/12/2018  9:00 AM   Dressing Status Open to air 2/12/2018  9:00 AM         Recommendations written as orders    Carolyn Dixon RN BSN

## 2018-02-12 NOTE — PROGRESS NOTES
Progress Note Neil Heard 3/30/1920, 80 y o  male MRN: [id number]    Unit/Bed#: -01 Encounter: [id number]    Primary Care Provider: Hanna Nava MD   Date and time admitted to hospital: 2/10/2018 10:40 AM        * Fever   Assessment & Plan    · Uncertain etiology  · Daughter reported a fever of 101 3 at living facility  · Patient remains afebrile since admission and without evidence of leukocytosis or tachycardia  Continue to monitor VS and WBC  · Received 1 dose of IV cefepime and vancomycin on presentation to ED  · Continue to monitor off IV antibiotics  · Urine culture growing proteus species  Patient with chronic incontinence  · Discussed with attending  Possible colonization  Monitor off antibiotics  · Blood cultures x 2: negative thus far  · CXR: Left retrocardiac moderate size hiatal hernia with probable adjacent chronic scarring and subsegmental atelectasis  Cannot exclude adjacent acute infiltrate  · US of scrotum, testicles obtained given swelling: Small bilateral hydroceles with scrotal wall edema  · Elevate area  · Influenza/RSV negative  · Of note, patient recently treated as outpatient for left heel ulcer with cellulitis with 10 days p o  Keflex and was also recently hospitalized, treated for PNA  Decubitus ulcer of sacral region, unstageable [facility])   Assessment & Plan    · Decubitus ulcer, unstageable, POA, per charting in H&P  · Wound bed not able to be visualized secondary to eschar  · Wound care consulted  Recommended surgery consult for possible debridement  · Surgery consulted, discussed with Dr Xochilt Fritz  Recommending collagenase dressing to wound and no surgical debridement at this time  · Continue local wound care  · P-500 mattress in place given multiple wounds  · Frequent repositioning  Deep tissue injury   Assessment & Plan    · R buttock with DTI  No open area or drainage reported on R buttock    · Wound care consulted, recommending offloading of pressure and hydraguard cream    · Frequent repositioning  Normocytic anemia   Assessment & Plan    · Hemoglobin stable at 8 6 today  · Heme check stools  · Repeat CBC in AM          Bilateral leg edema   Assessment & Plan    · Continue p o  Lasix 40 mg daily  · Monitor edema  Ambulatory dysfunction   Assessment & Plan    · Patient reportedly mostly wheelchair bound  · Resides at [address] [place] [address] assisted living facility  · Spoke with patient's daughter who is interested in home hospice  Hospice consulted  · PT/OT consulted  Chronic atrial fibrillation (HCC)   Assessment & Plan    · Anticoagulated wiith warfarin  · INR 2 52 today  Goal INR 2-3  · Continue current dose of Coumadin, 5 mg daily  · HR controlled on flecanide  SSS (sick sinus syndrome) ([facility])   Assessment & Plan    · SSS status post pacemaker placement  · Outpatient follow-up with device Clinic and cardiologist, Dr Faustino Garcia  Benign prostatic hyperplasia with lower urinary tract symptoms   Assessment & Plan    · Continue finasteride, detrol and tamsulosin  · Patient with chronic incontinence  VTE Pharmacologic Prophylaxis:   Pharmacologic: Warfarin (Coumadin)  Mechanical VTE Prophylaxis in Place: Yes    Patient Centered Rounds: I have performed bedside rounds with nursing staff today  Discussions with Specialists or Other Care Team Provider: Nursing  Education and Discussions with Family / Patient: Patient and pt's daughter  Patient's daughter requesting hospice consult, given multiple hospitalizations, multiple ulcers, poor appetite  Time Spent for Care: 20 minutes  More than 50% of total time spent on counseling and coordination of care as described above      Current Length of Stay: 2 day(s)    Current Patient Status: Inpatient   Certification Statement: The patient will continue to require additional inpatient hospital stay due to family requesting hospice consult    Discharge Plan: pending hospice consult  Code Status: Level 3 - DNAR and DNI      Subjective:   Patient admits to lower back/ buttock pain  He has chronic incontinence and reports once or twice since admission he noticed some burning with urinating  He denies chest pain or SOB  Objective:     Vitals:   Temp (24hrs), Av 1 °F (36 7 °C), Min:97 8 °F (36 6 °C), Max:98 7 °F (37 1 °C)    HR:  [70-82] 70  Resp:  [18] 18  BP: (123-151)/(54-66) 133/61  SpO2:  [94 %-97 %] 97 %  Body mass index is 29 16 kg/m²  Input and Output Summary (last 24 hours): Intake/Output Summary (Last 24 hours) at 18 1553  Last data filed at 18 1301   Gross per 24 hour   Intake              420 ml   Output              380 ml   Net               40 ml       Physical Exam:     Physical Exam   Constitutional: No distress  Elderly, frail   HENT:   Head: Normocephalic and atraumatic  Eyes: Conjunctivae are normal    Cardiovascular: An irregularly irregular rhythm present  Pulmonary/Chest: Effort normal  No respiratory distress  He has decreased breath sounds  Abdominal: Soft  Bowel sounds are normal  There is no tenderness  Musculoskeletal: He exhibits edema (lower extremities  )  Neurological: He is alert  Oriented to person and time  Skin: Skin is warm and dry  He is not diaphoretic  Unstageable pressure ulcer of left buttocks without purulent drainage  Psychiatric: He has a normal mood and affect  Nursing note and vitals reviewed        Additional Data:     Labs:      Results from last 7 days  Lab Units 18  0442   WBC Thousand/uL 9 11   HEMOGLOBIN g/dL 8 6*   HEMATOCRIT % 27 8*   PLATELETS Thousands/uL 255   NEUTROS PCT % 80*   LYMPHS PCT % 13*   MONOS PCT % 6   EOS PCT % 1       Results from last 7 days  Lab Units 18  0442   SODIUM mmol/L 141   POTASSIUM mmol/L 3 9   CHLORIDE mmol/L 106   CO2 mmol/L 28   BUN mg/dL 18   CREATININE mg/dL 1 09   CALCIUM mg/dL 8 2*   GLUCOSE RANDOM mg/dL 101       Results from last 7 days  Lab Units 02/12/18  0442   INR  2 52*       * I Have Reviewed All Lab Data Listed Above  * Additional Pertinent Lab Tests Reviewed: All Labs Within Last 24 Hours Reviewed    Imaging:    Imaging Reports Reviewed Today Include: US scrotum  Imaging Personally Reviewed by Myself Includes:  none    Recent Cultures (last 7 days):       Results from last 7 days  Lab Units 02/10/18  1747 02/10/18  1500 02/10/18  1441 02/10/18  1440   BLOOD CULTURE   --  No Growth at 24 hrs   --  No Growth at 24 hrs     URINE CULTURE   --   --  60,000-69,000 cfu/ml Proteus species*  --    INFLUENZA A PCR  None Detected  --   --   --    INFLUENZA B PCR  None Detected  --   --   --    RSV PCR  None Detected  --   --   --        Last 24 Hours Medication List:     Current Facility-Administered Medications:  acetaminophen 650 mg Oral Q6H PRN Dea Zurita MD   calcium carbonate 1,000 mg Oral Daily PRN Dea Zurita MD   collagenase  Topical Daily Elia Flynn DO   cycloSPORINE 1 drop Both Eyes BID Dea Zurita MD   docusate sodium 100 mg Oral BID PRN Dea Zurita MD   finasteride 5 mg Oral HS Dea Zurita MD   flecainide 50 mg Oral QAM Dea Zurita MD   furosemide 40 mg Oral QAM Dea Zurita MD   gabapentin 100 mg Oral Daily Dea Zurita MD   galantamine 4 mg Oral Daily Dea Zurita MD   lidocaine 1 patch Transdermal Daily Ladonna Dominguez PA-C   ondansetron 4 mg Intravenous Q6H PRN Dea Zurita MD   pantoprazole 40 mg Oral BID AC Dea Zurita MD   potassium chloride 20 mEq Oral Daily Dea Zurita MD   senna 2 tablet Oral BID Dea Zurita MD   sodium chloride 2 spray Each Nare PRN Dea Zurita MD   tamsulosin 0 4 mg Oral Daily With Dinner Dea Zurita MD   tolterodine 2 mg Oral Daily Dea Zurita MD   traZODone 50 mg Oral HS Dea Zurita MD   warfarin 5 mg Oral Daily (warfarin) Crow Cuello MD        Today, Patient Was Seen By: Roselyn Fischer PA-C    ** Please Note: Dictation voice to text software may have been used in the creation of this document   **

## 2018-02-12 NOTE — SOCIAL WORK
CORRY spoke with patient's daughter Destiny Mendez about hospice referral   She has already been in touch with  Hospice so CM has sent the referral through [address]  Patient's daughter reported that she has an agency she is working with and is unsure how quickly she will be able to get the 24 hour care in place  She is sure patient will need an air mattress at discharge  CM explained that hospice would assist with getting all necessary equipment ordered and delivered prior to discharge and that once hospice has accepted him to anticipate discharge soon after as long as he is medically stable  CM dept will continue to follow

## 2018-02-12 NOTE — ASSESSMENT & PLAN NOTE
· R buttock with DTI  No open area or drainage reported on R buttock  · Wound care consulted, recommending offloading of pressure and hydraguard cream    · Frequent repositioning

## 2018-02-12 NOTE — ASSESSMENT & PLAN NOTE
· SSS status post pacemaker placement  · Outpatient follow-up with device Clinic and cardiologist, Dr Sebastian Foster

## 2018-02-13 LAB
BACTERIA UR CULT: ABNORMAL
BACTERIA UR CULT: ABNORMAL
ERYTHROCYTE [DISTWIDTH] IN BLOOD BY AUTOMATED COUNT: 15.9 % (ref 11.6–15.1)
HCT VFR BLD AUTO: 28.9 % (ref 36.5–49.3)
HGB BLD-MCNC: 8.9 G/DL (ref 12–17)
INR PPP: 3.05 (ref 0.86–1.16)
MCH RBC QN AUTO: 24.9 PG (ref 26.8–34.3)
MCHC RBC AUTO-ENTMCNC: 30.8 G/DL (ref 31.4–37.4)
MCV RBC AUTO: 81 FL (ref 82–98)
PLATELET # BLD AUTO: 275 THOUSANDS/UL (ref 149–390)
PMV BLD AUTO: 9.3 FL (ref 8.9–12.7)
PROTHROMBIN TIME: 32.7 SECONDS (ref 12.1–14.4)
RBC # BLD AUTO: 3.57 MILLION/UL (ref 3.88–5.62)
WBC # BLD AUTO: 10.6 THOUSAND/UL (ref 4.31–10.16)

## 2018-02-13 PROCEDURE — 85610 PROTHROMBIN TIME: CPT | Performed by: PHYSICIAN ASSISTANT

## 2018-02-13 PROCEDURE — 85027 COMPLETE CBC AUTOMATED: CPT | Performed by: PHYSICIAN ASSISTANT

## 2018-02-13 PROCEDURE — 99232 SBSQ HOSP IP/OBS MODERATE 35: CPT | Performed by: NURSE PRACTITIONER

## 2018-02-13 PROCEDURE — 99231 SBSQ HOSP IP/OBS SF/LOW 25: CPT | Performed by: PHYSICIAN ASSISTANT

## 2018-02-13 RX ADMIN — GABAPENTIN 100 MG: 100 CAPSULE ORAL at 08:26

## 2018-02-13 RX ADMIN — FUROSEMIDE 40 MG: 40 TABLET ORAL at 08:26

## 2018-02-13 RX ADMIN — LIDOCAINE 1 PATCH: 50 PATCH TOPICAL at 08:26

## 2018-02-13 RX ADMIN — SENNOSIDES 17.2 MG: 8.6 TABLET, FILM COATED ORAL at 08:26

## 2018-02-13 RX ADMIN — TOLTERODINE TARTRATE 2 MG: 2 CAPSULE, EXTENDED RELEASE ORAL at 08:26

## 2018-02-13 RX ADMIN — COLLAGENASE SANTYL: 250 OINTMENT TOPICAL at 08:27

## 2018-02-13 RX ADMIN — TAMSULOSIN HYDROCHLORIDE 0.4 MG: 0.4 CAPSULE ORAL at 17:16

## 2018-02-13 RX ADMIN — TRAZODONE HYDROCHLORIDE 50 MG: 50 TABLET ORAL at 22:44

## 2018-02-13 RX ADMIN — PANTOPRAZOLE SODIUM 40 MG: 40 TABLET, DELAYED RELEASE ORAL at 08:26

## 2018-02-13 RX ADMIN — FLECAINIDE ACETATE 50 MG: 50 TABLET ORAL at 08:26

## 2018-02-13 RX ADMIN — SENNOSIDES 17.2 MG: 8.6 TABLET, FILM COATED ORAL at 17:16

## 2018-02-13 RX ADMIN — PANTOPRAZOLE SODIUM 40 MG: 40 TABLET, DELAYED RELEASE ORAL at 17:16

## 2018-02-13 RX ADMIN — TRAMADOL HYDROCHLORIDE 50 MG: 50 TABLET, FILM COATED ORAL at 09:41

## 2018-02-13 RX ADMIN — POTASSIUM CHLORIDE 20 MEQ: 20 SOLUTION ORAL at 08:26

## 2018-02-13 RX ADMIN — FINASTERIDE 5 MG: 5 TABLET, FILM COATED ORAL at 22:44

## 2018-02-13 NOTE — PHYSICIAN ADVISOR
This patient is a 80 y o  y/o male who is admitted to the hospital for Fever - 75 years or older ([facility]  pt reports b/l groin and flank pain since tuesday  hx of UTIs  )   The patient presented to the ED on 2/10/18 at [phone] and was admitted to the hospital on 2/10/2018 1040  History of Present Illness includes: the patient is a resident of an assisted living facility and he presented to the hospital with  A fever of 103 degrees  Vital signs in the ER are as follows   ED Triage Vitals   Temperature Pulse Respirations Blood Pressure SpO2   02/10/18 0940 02/10/18 0940 02/10/18 0940 02/10/18 0940 02/10/18 0940   98 4 °F (36 9 °C) 94 16 111/56 96 %      Temp Source Heart Rate Source Patient Position - Orthostatic VS BP Location FiO2 (%)   02/10/18 0940 02/10/18 0940 02/10/18 1541 02/10/18 1421 --   Oral Monitor Lying Left arm       Pain Score       02/10/18 0940       5         ON exam the breath sounds are decreased and there is bilateral pitting edema  WBC is 11 8 and Hgb is 9 1  Cr is 1 26  The patient is being admitted with fever and sacral decubitus ulcer  The plan of care includes influenza PCR, cultures, wound mgt consultation, daily lasix for chronic CHF, This patient is appropriate for INPATIENT admission as his length of stay is expected to be at least 2 midnights

## 2018-02-13 NOTE — ASSESSMENT & PLAN NOTE
· Anticoagulated wiith warfarin  · INR 3 05  Goal INR 2-3   · Hold Coumadin  · Repeat INR in the morning   · HR controlled on flecanide

## 2018-02-13 NOTE — ASSESSMENT & PLAN NOTE
· SSS status post pacemaker placement  · Outpatient follow-up with device Clinic and cardiologist, Dr Bea Melgar

## 2018-02-13 NOTE — SOCIAL WORK
Wallace dawson w hospice liaison who reports pt is approved  Will need dme---they will attempt to deliver today  Wallace padilla pts daughter who is reporting that she likely will not have everything in place for pt to return today  Agreed that plan will be for pt to tentatively d c tomorrow after 3pm  Daughter will work today to get the room cleaned out where the bed will go and to have the 24 hour aide service in place   Wallace to follow

## 2018-02-13 NOTE — PROGRESS NOTES
Progress Note -Surgery PA  Edward Dura 80 y o  male MRN: [id number]  Unit/Bed#: -01 Encounter: [id number]      Subjective/Objective     Subjective: Pt resting comfortably    Objective:     /61 (BP Location: Left arm)   Pulse 76   Temp 98 3 °F (36 8 °C) (Oral)   Resp 18   Ht 5' 8" (1 727 m)   Wt 85 5 kg (188 lb 7 9 oz)   SpO2 96%   BMI 28 66 kg/m²       Intake/Output Summary (Last 24 hours) at 02/13/18 8878  Last data filed at 02/13/18 8908   Gross per 24 hour   Intake              220 ml   Output              329 ml   Net             -109 ml       Invasive Devices     Peripheral Intravenous Line            Peripheral IV 02/10/18 Right Antecubital 2 days                Physical Exam:  General: aao  Skin: No dressing currently on ulcer   Eschar noted to cover most of wound, non fluctuant, no mal odor      Current Facility-Administered Medications:     acetaminophen (TYLENOL) tablet 650 mg, 650 mg, Oral, Q6H PRN, Dea Zurita MD, 650 mg at 02/12/18 1550    calcium carbonate (TUMS) chewable tablet 1,000 mg, 1,000 mg, Oral, Daily PRN, Dea Zurita MD    collagenase (SANTYL) ointment, , Topical, Daily, Elia Flynn,     cycloSPORINE (RESTASIS) 0 05 % ophthalmic emulsion 1 drop, 1 drop, Both Eyes, BID, Dea Zurita MD    docusate sodium (COLACE) capsule 100 mg, 100 mg, Oral, BID PRN, Dea Zurita MD    finasteride (PROSCAR) tablet 5 mg, 5 mg, Oral, HS, Dea Zurita MD, 5 mg at 02/12/18 2130    flecainide (TAMBOCOR) tablet 50 mg, 50 mg, Oral, Dea RANDLE MD, 50 mg at 02/12/18 0836    furosemide (LASIX) tablet 40 mg, 40 mg, Oral, QADea GARCIA MD, 40 mg at 02/12/18 0836    gabapentin (NEURONTIN) capsule 100 mg, 100 mg, Oral, Daily, Dea Zurita MD, 100 mg at 02/12/18 0836    galantamine (RAZADYNE) tablet 4 mg, 4 mg, Oral, Daily, Dea Zurita MD    lidocaine (LIDODERM) 5 % patch 1 patch, 1 patch, Transdermal, Daily, Darren Blackman PA-C, 1 patch at 02/12/18 1550    ondansetron (ZOFRAN) injection 4 mg, 4 mg, Intravenous, Q6H PRN, Dea Zurita MD    pantoprazole (PROTONIX) EC tablet 40 mg, 40 mg, Oral, BID AC, Dea Zurita MD, 40 mg at 02/12/18 1721    potassium chloride 10 % oral solution 20 mEq, 20 mEq, Oral, Daily, Dea Zurita MD, 20 mEq at 02/12/18 0836    senna (SENOKOT) tablet 17 2 mg, 2 tablet, Oral, BID, Dea Zurita MD, 17 2 mg at 02/12/18 1722    sodium chloride (OCEAN) 0 65 % nasal spray 2 spray, 2 spray, Each Nare, PRN, Dea Zurita MD    tamsulosin (FLOMAX) capsule 0 4 mg, 0 4 mg, Oral, Daily With Juan J Zurita MD, 0 4 mg at 02/12/18 1721    tolterodine (DETROL LA) 24 hr capsule 2 mg, 2 mg, Oral, Daily, Dea Zurita MD, 2 mg at 02/12/18 0836    traMADol (ULTRAM) tablet 50 mg, 50 mg, Oral, Q6H PRN, Jace Lopez PA-C    traZODone (DESYREL) tablet 50 mg, 50 mg, Oral, HS, Dea Zurita MD, 50 mg at 02/12/18 2130    warfarin (COUMADIN) tablet 5 mg, 5 mg, Oral, Daily (warfarin), Dea Zurita MD, 5 mg at 02/12/18 1722              Lab, Imaging and other studies:  I have personally reviewed pertinent lab results  , CBC:   Lab Results   Component Value Date    WBC 10 60 (H) 02/13/2018    HGB 8 9 (L) 02/13/2018    HCT 28 9 (L) 02/13/2018    MCV 81 (L) 02/13/2018     02/13/2018    MCH 24 9 (L) 02/13/2018    MCHC 30 8 (L) 02/13/2018    RDW 15 9 (H) 02/13/2018    MPV 9 3 02/13/2018   , CMP: No results found for: NA, K, CL, CO2, ANIONGAP, BUN, CREATININE, GLUCOSE, CALCIUM, AST, ALT, ALKPHOS, PROT, ALBUMIN, BILITOT, EGFR  Labs in chart were reviewed    Lab Results   Component Value Date    WBC 10 60 (H) 02/13/2018    WBC 5 10 04/02/2015    HGB 8 9 (L) 02/13/2018    HGB 13 5 04/02/2015    HCT 28 9 (L) 02/13/2018    HCT 41 0 04/02/2015     02/13/2018     04/02/2015     Lab Results   Component Value Date     02/12/2018     (L) 04/02/2015    K 3 9 02/12/2018    K 3 7 04/02/2015     02/12/2018     04/02/2015    CO2 28 02/12/2018    CO2 25 04/02/2015    BUN 18 02/12/2018    BUN 16 04/02/2015    CREATININE 1 09 02/12/2018    CREATININE 0 93 04/02/2015    GLUCOSE 101 02/12/2018    GLUCOSE 94 04/02/2015       VTE Pharmacologic Prophylaxis: Warfarin (Coumadin)  VTE Mechanical Prophylaxis: sequential compression device    Assessment/plan:    71-year-old male with unstageable left buttock pressure ulcer  -santyl dressing to be changed daily or as needed due to patient's incontinence   -turn every 2 hours to offload pressure on buttock area      Patient Active Problem List   Diagnosis    CAP (community acquired pneumonia)    Benign prostatic hyperplasia with lower urinary tract symptoms    SSS (sick sinus syndrome) (HCC)    Chronic atrial fibrillation (HCC)    S/P cardiac pacemaker procedure    GERD (gastroesophageal reflux disease)    Ambulatory dysfunction    Sinus infection    Bilateral leg edema    Edema    Decubitus ulcer of left heel    Cellulitis and abscess of foot    Fever    Decubitus ulcer of sacral region, unstageable ([address] )    Normocytic anemia    Deep tissue injury          This text is generated with voice recognition software  There may be translation, syntax,  or grammatical errors  If you have any questions, please contact the dictating provider      María Elena Zhu PA-C

## 2018-02-13 NOTE — ASSESSMENT & PLAN NOTE
· Decubitus ulcer, unstageable, POA  · Wound care following  · Surgery following, no plans for surgical intervention at this time  · Continue air mattress   · Continue wound care daily, collagenase dressing to wound  · Turn and reposition and offload as tolerated

## 2018-02-13 NOTE — PHYSICAL THERAPY NOTE
PHYSICAL THERAPY NOTE    Patient Name: Simi Tuttle  SLGPR'A Date: 2/13/2018  Chart reviewed and spoke to Luke Matthews from case mangement  Per her notes: pt is approved for hospice  Plan will be for pt to tentatively d c tomorrow after 3pm  Daughter will work today to get the room cleaned out where the bed will go and to have the 24 hour aide service in place  Will remove from IPPT census    Maribeth Blue, PT

## 2018-02-13 NOTE — PROGRESS NOTES
Progress Note Real Ran 3/30/1920, 80 y o  male MRN: [id number]    Unit/Bed#: -01 Encounter: [id number]    Primary Care Provider: Terrance Rogers MD   Date and time admitted to hospital: 2/10/2018 10:40 AM        Deep tissue injury   Assessment & Plan    · R buttock with DTI  No open area or drainage reported on R buttock  · Wound care consulted, recommending offloading of pressure and hydraguard cream    · Frequent repositioning  Normocytic anemia   Assessment & Plan    · Hemoglobin stable at 8 9 today  · Heme check stools  · Monitor CBC         Decubitus ulcer of sacral region, unstageable ([facility] [address] )   Assessment & Plan    · Decubitus ulcer, unstageable, POA  · Wound care following  · Surgery following, no plans for surgical intervention at this time  · Continue air mattress   · Continue wound care daily, collagenase dressing to wound  · Turn and reposition and offload as tolerated        Bilateral leg edema   Assessment & Plan    · Continue p o  Lasix 40 mg daily  · Monitor edema  Ambulatory dysfunction   Assessment & Plan    · Patient reportedly mostly wheelchair bound  · Resides at [address] [place] [address] assisted living [facility]  · Family request hospice  · Hospice consult pending plan discharge when arrangements made        Chronic atrial fibrillation [facility])   Assessment & Plan    · Anticoagulated wiith warfarin  · INR 3 05  Goal INR 2-3   · Hold Coumadin  · Repeat INR in the morning   · HR controlled on flecanide  SSS (sick sinus syndrome) (HCC)   Assessment & Plan    · SSS status post pacemaker placement  · Outpatient follow-up with device Clinic and cardiologist, Dr Nathaly Ortega  Benign prostatic hyperplasia with lower urinary tract symptoms   Assessment & Plan    · Continue finasteride, detrol and tamsulosin  · Patient with chronic incontinence           * Fever   Assessment & Plan    · Present on admission of uncertain etiology, resolved   · Daughter reported a fever of 101 3 at living facility  · Patient remains afebrile since admission and without evidence of leukocytosis or tachycardia  Continue to monitor VS and WBC  · Received 1 dose of IV cefepime and vancomycin on presentation to ED  · Continue to monitor off IV antibiotics  · Urine culture growing proteus species  Patient with chronic incontinence  Possible colonization  Monitor off antibiotics  · Blood cultures x 2: negative  · CXR: Left retrocardiac moderate size hiatal hernia with probable adjacent chronic scarring and subsegmental atelectasis  Cannot exclude adjacent acute infiltrate  · US of scrotum, testicles obtained given swelling: Small bilateral hydroceles with scrotal wall edema  · Elevate area  · Influenza/RSV negative  · Of note, patient recently treated as outpatient for left heel ulcer with cellulitis with 10 days p o  Keflex and was also recently hospitalized, treated for PNA  VTE Pharmacologic Prophylaxis:   Pharmacologic: Warfarin (Coumadin)  Mechanical VTE Prophylaxis in Place: No    Patient Centered Rounds: I have performed bedside rounds with nursing staff today  Discussions with Specialists or Other Care Team Provider: case mgmt    Education and Discussions with Family / Patient:     Time Spent for Care: 30 minutes  More than 50% of total time spent on counseling and coordination of care as described above  Current Length of Stay: 3 day(s)    Current Patient Status: Inpatient   Certification Statement: The patient will continue to require additional inpatient hospital stay due to plan for hospice, awaiting arrangments    Discharge Plan: home with hospice    Code Status: Level 3 - DNAR and DNI      Subjective:   Patient reports pain in buttocks  Denies CP or SOB  No n/v  Appetite poor   Per nsg no overnight events    Objective:     Vitals:   Temp (24hrs), Av 8 °F (37 1 °C), Min:98 3 °F (36 8 °C), Max:99 6 °F (37 6 °C)    HR:  [72-76] 76  Resp:  [18] 18  BP: (123-133)/(57-61) 123/57  SpO2:  [92 %-96 %] 92 %  Body mass index is 28 66 kg/m²  Input and Output Summary (last 24 hours): Intake/Output Summary (Last 24 hours) at 02/13/18 1801  Last data filed at 02/13/18 1401   Gross per 24 hour   Intake              340 ml   Output              449 ml   Net             -109 ml       Physical Exam:     Physical Exam   Constitutional: He appears well-developed  No distress  Frail, ill appearing   HENT:   Head: Normocephalic  Neck: Neck supple  Cardiovascular: Normal rate  An irregular rhythm present  Pulmonary/Chest: Effort normal  No accessory muscle usage  No respiratory distress  He has decreased breath sounds  Abdominal: Soft  Bowel sounds are normal  He exhibits no distension  There is no tenderness  Musculoskeletal: He exhibits edema  2+ BLE edema  Decreased ROM BLE   Neurological: He is alert  Skin: Skin is warm and dry  unstageable left buttock pressure ulcer   Psychiatric: He has a normal mood and affect  His behavior is normal    Vitals reviewed  Additional Data:     Labs:      Results from last 7 days  Lab Units 02/13/18  0622 02/12/18  0442   WBC Thousand/uL 10 60* 9 11   HEMOGLOBIN g/dL 8 9* 8 6*   HEMATOCRIT % 28 9* 27 8*   PLATELETS Thousands/uL 275 255   NEUTROS PCT %  --  80*   LYMPHS PCT %  --  13*   MONOS PCT %  --  6   EOS PCT %  --  1       Results from last 7 days  Lab Units 02/12/18  0442   SODIUM mmol/L 141   POTASSIUM mmol/L 3 9   CHLORIDE mmol/L 106   CO2 mmol/L 28   BUN mg/dL 18   CREATININE mg/dL 1 09   CALCIUM mg/dL 8 2*   GLUCOSE RANDOM mg/dL 101       Results from last 7 days  Lab Units 02/13/18  0622   INR  3 05*       * I Have Reviewed All Lab Data Listed Above  * Additional Pertinent Lab Tests Reviewed:  All Labs Within Last 24 Hours Reviewed    Imaging:    Imaging Reports Reviewed Today Include: scrotal U/S  Imaging Personally Reviewed by Myself Includes:  none    Recent Cultures (last 7 days):       Results from last 7 days  Lab Units 02/10/18  1747 02/10/18  1500 02/10/18  1441 02/10/18  1440   BLOOD CULTURE   --  No Growth at 48 hrs  --  No Growth at 48 hrs  URINE CULTURE   --   --  80,000-89,000 cfu/ml Proteus mirabilis*  40,000-49,000 cfu/ml   --    INFLUENZA A PCR  None Detected  --   --   --    INFLUENZA B PCR  None Detected  --   --   --    RSV PCR  None Detected  --   --   --        Last 24 Hours Medication List:     Current Facility-Administered Medications:  acetaminophen 650 mg Oral Q6H PRN Dea Zurita MD   calcium carbonate 1,000 mg Oral Daily PRN Dea Zurita MD   collagenase  Topical Daily Elia Flynn DO   cycloSPORINE 1 drop Both Eyes BID Dea Zurita MD   docusate sodium 100 mg Oral BID PRN Dea Zurita MD   finasteride 5 mg Oral HS Dea Zurita MD   flecainide 50 mg Oral QAM Dea Zurita MD   furosemide 40 mg Oral QAM Dea Zurita MD   gabapentin 100 mg Oral Daily Dea Zurita MD   galantamine 4 mg Oral Daily Dea Zurita MD   lidocaine 1 patch Transdermal Daily Ladonna Dominguez PA-C   ondansetron 4 mg Intravenous Q6H PRN Dea Zurita MD   pantoprazole 40 mg Oral BID AC Dea Zurita MD   potassium chloride 20 mEq Oral Daily Dea Zurita MD   senna 2 tablet Oral BID Dea Zurita MD   sodium chloride 2 spray Each Nare PRN Dea Zurita MD   tamsulosin 0 4 mg Oral Daily With Dinner Dea Zurita MD   tolterodine 2 mg Oral Daily Dea Zurita MD   traMADol 50 mg Oral Q6H PRN Quintin Larkin PA-C   traZODone 50 mg Oral HS Dea Zurita MD        Today, Patient Was Seen By: ANTONIO Burr    ** Please Note: Dictation voice to text software may have been used in the creation of this document   **

## 2018-02-13 NOTE — SOCIAL WORK
Cm spoke w daughter who is still cleaning out room for pts hospital bed   Cm to speak w her tomorrow for possible d c

## 2018-02-13 NOTE — ASSESSMENT & PLAN NOTE
· Present on admission of uncertain etiology, resolved   · Daughter reported a fever of 101 3 at living facility  · Patient remains afebrile since admission and without evidence of leukocytosis or tachycardia  Continue to monitor VS and WBC  · Received 1 dose of IV cefepime and vancomycin on presentation to ED  · Continue to monitor off IV antibiotics  · Urine culture growing proteus species  Patient with chronic incontinence  Possible colonization  Monitor off antibiotics  · Blood cultures x 2: negative  · CXR: Left retrocardiac moderate size hiatal hernia with probable adjacent chronic scarring and subsegmental atelectasis  Cannot exclude adjacent acute infiltrate  · US of scrotum, testicles obtained given swelling: Small bilateral hydroceles with scrotal wall edema  · Elevate area  · Influenza/RSV negative  · Of note, patient recently treated as outpatient for left heel ulcer with cellulitis with 10 days p o  Keflex and was also recently hospitalized, treated for PNA

## 2018-02-13 NOTE — ASSESSMENT & PLAN NOTE
· Patient reportedly mostly wheelchair bound  · Resides at [address] [place] [address] assisted living [facility]    · Family request hospice  · Hospice consult pending plan discharge when arrangements made

## 2018-02-13 NOTE — PLAN OF CARE
Problem: DISCHARGE PLANNING - CARE MANAGEMENT  Goal: Discharge to post-acute care or home with appropriate resources  INTERVENTIONS:  - Conduct assessment to determine patient/family and health care team treatment goals, and need for post-acute services based on payer coverage, community resources, and patient preferences, and barriers to discharge  - Address psychosocial, clinical, and financial barriers to discharge as identified in assessment in conjunction with the patient/family and health care team  - Arrange appropriate level of post-acute services according to patient's   needs and preference and payer coverage in collaboration with the physician and health care team  - Communicate with and update the patient/family, physician, and health care team regarding progress on the discharge plan  - Arrange appropriate transportation to post-acute venues   Outcome: Progressing  Wallace dawson w hospice liaison who reports pt is approved  Will need dme---they will attempt to deliver today  Wallace dawson w pts daughter who is reporting that she likely will not have everything in place for pt to return today  Agreed that plan will be for pt to tentatively d c tomorrow after 3pm  Daughter will work today to get the room cleaned out where the bed will go and to have the 24 hour aide service in place   Wallace escobedo follow

## 2018-02-13 NOTE — HOSPICE NOTE
This nurse has spoken several times with pts daughter  She wants to take her father home on hospice care but states she needs time to get her home ready and home caregivers in place  Reviewed hospice services and benefits with her and she verbalizes understanding  Consents faxed to her and she is going to fax them to the office  This nurse will order equipment when dc date is known

## 2018-02-14 PROBLEM — L03.119 CELLULITIS AND ABSCESS OF FOOT: Status: RESOLVED | Noted: 2018-01-31 | Resolved: 2018-02-14

## 2018-02-14 PROBLEM — L02.619 CELLULITIS AND ABSCESS OF FOOT: Status: RESOLVED | Noted: 2018-01-31 | Resolved: 2018-02-14

## 2018-02-14 LAB
ANION GAP SERPL CALCULATED.3IONS-SCNC: 8 MMOL/L (ref 4–13)
BASOPHILS # BLD AUTO: 0.01 THOUSANDS/ΜL (ref 0–0.1)
BASOPHILS NFR BLD AUTO: 0 % (ref 0–1)
BUN SERPL-MCNC: 20 MG/DL (ref 5–25)
CALCIUM SERPL-MCNC: 8.1 MG/DL (ref 8.3–10.1)
CHLORIDE SERPL-SCNC: 106 MMOL/L (ref 100–108)
CO2 SERPL-SCNC: 26 MMOL/L (ref 21–32)
CREAT SERPL-MCNC: 1.12 MG/DL (ref 0.6–1.3)
EOSINOPHIL # BLD AUTO: 0.06 THOUSAND/ΜL (ref 0–0.61)
EOSINOPHIL NFR BLD AUTO: 1 % (ref 0–6)
ERYTHROCYTE [DISTWIDTH] IN BLOOD BY AUTOMATED COUNT: 15.7 % (ref 11.6–15.1)
GFR SERPL CREATININE-BSD FRML MDRD: 55 ML/MIN/1.73SQ M
GLUCOSE SERPL-MCNC: 103 MG/DL (ref 65–140)
HCT VFR BLD AUTO: 28.1 % (ref 36.5–49.3)
HGB BLD-MCNC: 9.1 G/DL (ref 12–17)
INR PPP: 3.26 (ref 0.86–1.16)
LYMPHOCYTES # BLD AUTO: 1.38 THOUSANDS/ΜL (ref 0.6–4.47)
LYMPHOCYTES NFR BLD AUTO: 14 % (ref 14–44)
MCH RBC QN AUTO: 26.3 PG (ref 26.8–34.3)
MCHC RBC AUTO-ENTMCNC: 32.4 G/DL (ref 31.4–37.4)
MCV RBC AUTO: 81 FL (ref 82–98)
MONOCYTES # BLD AUTO: 0.7 THOUSAND/ΜL (ref 0.17–1.22)
MONOCYTES NFR BLD AUTO: 7 % (ref 4–12)
NEUTROPHILS # BLD AUTO: 8.02 THOUSANDS/ΜL (ref 1.85–7.62)
NEUTS SEG NFR BLD AUTO: 78 % (ref 43–75)
PLATELET # BLD AUTO: 277 THOUSANDS/UL (ref 149–390)
PMV BLD AUTO: 9.4 FL (ref 8.9–12.7)
POTASSIUM SERPL-SCNC: 4.1 MMOL/L (ref 3.5–5.3)
PROTHROMBIN TIME: 34.5 SECONDS (ref 12.1–14.4)
RBC # BLD AUTO: 3.46 MILLION/UL (ref 3.88–5.62)
SODIUM SERPL-SCNC: 140 MMOL/L (ref 136–145)
WBC # BLD AUTO: 10.17 THOUSAND/UL (ref 4.31–10.16)

## 2018-02-14 PROCEDURE — 85025 COMPLETE CBC W/AUTO DIFF WBC: CPT | Performed by: NURSE PRACTITIONER

## 2018-02-14 PROCEDURE — 99232 SBSQ HOSP IP/OBS MODERATE 35: CPT | Performed by: NURSE PRACTITIONER

## 2018-02-14 PROCEDURE — 85610 PROTHROMBIN TIME: CPT | Performed by: NURSE PRACTITIONER

## 2018-02-14 PROCEDURE — 80048 BASIC METABOLIC PNL TOTAL CA: CPT | Performed by: NURSE PRACTITIONER

## 2018-02-14 RX ADMIN — PANTOPRAZOLE SODIUM 40 MG: 40 TABLET, DELAYED RELEASE ORAL at 08:02

## 2018-02-14 RX ADMIN — LIDOCAINE 1 PATCH: 50 PATCH TOPICAL at 08:03

## 2018-02-14 RX ADMIN — FLECAINIDE ACETATE 50 MG: 50 TABLET ORAL at 08:01

## 2018-02-14 RX ADMIN — TOLTERODINE TARTRATE 2 MG: 2 CAPSULE, EXTENDED RELEASE ORAL at 08:01

## 2018-02-14 RX ADMIN — GABAPENTIN 100 MG: 100 CAPSULE ORAL at 08:01

## 2018-02-14 RX ADMIN — FINASTERIDE 5 MG: 5 TABLET, FILM COATED ORAL at 22:43

## 2018-02-14 RX ADMIN — TRAZODONE HYDROCHLORIDE 50 MG: 50 TABLET ORAL at 22:43

## 2018-02-14 RX ADMIN — SENNOSIDES 17.2 MG: 8.6 TABLET, FILM COATED ORAL at 17:34

## 2018-02-14 RX ADMIN — COLLAGENASE SANTYL: 250 OINTMENT TOPICAL at 08:03

## 2018-02-14 RX ADMIN — POTASSIUM CHLORIDE 20 MEQ: 20 SOLUTION ORAL at 08:01

## 2018-02-14 RX ADMIN — PANTOPRAZOLE SODIUM 40 MG: 40 TABLET, DELAYED RELEASE ORAL at 17:34

## 2018-02-14 RX ADMIN — TAMSULOSIN HYDROCHLORIDE 0.4 MG: 0.4 CAPSULE ORAL at 17:34

## 2018-02-14 RX ADMIN — SENNOSIDES 17.2 MG: 8.6 TABLET, FILM COATED ORAL at 08:01

## 2018-02-14 RX ADMIN — FUROSEMIDE 40 MG: 40 TABLET ORAL at 08:02

## 2018-02-14 NOTE — PLAN OF CARE
Problem: DISCHARGE PLANNING - CARE MANAGEMENT  Goal: Discharge to post-acute care or home with appropriate resources  INTERVENTIONS:  - Conduct assessment to determine patient/family and health care team treatment goals, and need for post-acute services based on payer coverage, community resources, and patient preferences, and barriers to discharge  - Address psychosocial, clinical, and financial barriers to discharge as identified in assessment in conjunction with the patient/family and health care team  - Arrange appropriate level of post-acute services according to patient's   needs and preference and payer coverage in collaboration with the physician and health care team  - Communicate with and update the patient/family, physician, and health care team regarding progress on the discharge plan  - Arrange appropriate transportation to post-acute venues   Outcome: Progressing  CM spoke with patient's daughter Airam Archer about discharge home for patient with [facility]  Plan is for patient to discharge home tomorrow  She would like transport set up at 3:00 as this is when she gets out of work  CM confirmed with Layne Nolasco from [facility] that equipment was delivered to home and that a nurse will open with them on Friday  Airam Archer requested a bedpan, urinal, wash basin, and kidney basin to go home  These items were placed in patient's room  Airam Archer also asked about incontinence supplies and hospice will provide tab diapers  BLS was set via [facility] for 3pm tomorrow 2/15/18 to go to her home address which is [address]  IMM was reviewed with her at this time  CM dept will continue to follow

## 2018-02-14 NOTE — ASSESSMENT & PLAN NOTE
· Anticoagulated wimicky warfarin  · INR   Goal INR 2-3  · Today's INR 3 26  · Continue to hold Coumadin  · Repeat INR in the morning  · Discussed with daughter regarding whether or not she is going to be continuing his Coumadin after discharge given that the plan is to send him home with hospice  She is unsure at this time as she has not met with the hospice service yet and is unclear as to which medications he will be maintained on  I did discuss that this would require blood monitoring on a regular basis to ensure that he maintains a therapeutic level if she wishes to continue Coumadin  She will consider this  · HR controlled on flecanide

## 2018-02-14 NOTE — ASSESSMENT & PLAN NOTE
· SSS status post pacemaker placement  · Outpatient follow-up with device Clinic and cardiologist, Dr Eve Larkin

## 2018-02-14 NOTE — OCCUPATIONAL THERAPY NOTE
Occupational Therapy         Patient Name: Garcia Padilla  GWJBS'G Date: 2/14/2018    OT orders received and chart review completed   Per notes, pt is approved for hospice, and tentative D/C todat after 3PM  D/C OT    Sophie Robles, OT

## 2018-02-14 NOTE — PROGRESS NOTES
Progress Note Sybil Dad 3/30/1920, 80 y o  male MRN: [id number]    Unit/Bed#: -01 Encounter: [id number]    Primary Care Provider: Shweta Soto MD   Date and time admitted to hospital: 2/10/2018 10:40 AM        Deep tissue injury   Assessment & Plan    · R buttock with DTI  No open area or drainage reported on R buttock  · Wound care following, recommending offloading of pressure and hydraguard cream    · Frequent repositioning  Normocytic anemia   Assessment & Plan    · Hemoglobin stable at 9 1 today  · Heme check stools if possible  · Monitor CBC         Decubitus ulcer of sacral region, unstageable ([address] )   Assessment & Plan    · Decubitus ulcer, unstageable, POA  · Wound care following  · Surgery following, no plans for surgical intervention at this time  · Continue air mattress   · Continue wound care daily, collagenase dressing to wound  · Turn and reposition and offload as tolerated        Bilateral leg edema   Assessment & Plan    · Continue p o  Lasix 40 mg daily  · Monitor edema  Ambulatory dysfunction   Assessment & Plan    · Patient wheelchair/bed bound  · Resides at [facility], assisted living facility, but will be discharged to daughter's home to go on hospice care  · Arrangements set up for discharge to daughter's home tomorrow at 3:00 p m  Chronic atrial fibrillation (HCC)   Assessment & Plan    · Anticoagulated wiith warfarin  · INR   Goal INR 2-3  · Today's INR 3 26  · Continue to hold Coumadin  · Repeat INR in the morning  · Discussed with daughter regarding whether or not she is going to be continuing his Coumadin after discharge given that the plan is to send him home with hospice  She is unsure at this time as she has not met with the hospice service yet and is unclear as to which medications he will be maintained on    I did discuss that this would require blood monitoring on a regular basis to ensure that he maintains a therapeutic level if she wishes to continue Coumadin  She will consider this  · HR controlled on flecanide  SSS (sick sinus syndrome) (HCC)   Assessment & Plan    · SSS status post pacemaker placement  · Outpatient follow-up with device Clinic and cardiologist, Dr Yonis Colvin  Benign prostatic hyperplasia with lower urinary tract symptoms   Assessment & Plan    · Continue finasteride, detrol and tamsulosin  · Patient with chronic incontinence  * Fever   Assessment & Plan    · Present on admission of uncertain etiology, resolved   · Daughter reported a fever of 101 3 at living facility  · Patient remains afebrile since admission mild leukocytosis noted  Continue to monitor VS and WBC  · Received 1 dose of IV cefepime and vancomycin on presentation to ED  · Continue to monitor off IV antibiotics  · Suspect leukocytosis is reactive related to current condition  · Urine culture growing proteus species  Patient with chronic incontinence  Possible colonization  Monitor off antibiotics  · Blood cultures x 2: negative  · CXR: Left retrocardiac moderate size hiatal hernia with probable adjacent chronic scarring and subsegmental atelectasis  Cannot exclude adjacent acute infiltrate  · US of scrotum, testicles obtained given swelling: Small bilateral hydroceles with scrotal wall edema  · Elevate area  · Influenza/RSV negative  · Of note, patient recently treated as outpatient for left heel ulcer with cellulitis with 10 days p o  Keflex and was also recently hospitalized, treated for PNA  VTE Pharmacologic Prophylaxis:   Pharmacologic: Warfarin (Coumadin)  Mechanical VTE Prophylaxis in Place: No    Patient Centered Rounds: I have performed bedside rounds with nursing staff today  Discussions with Specialists or Other Care Team Provider:     Education and Discussions with Family / Patient:  Spoke with daughter    Time Spent for Care: 30 minutes    More than 50% of total time spent on counseling and coordination of care as described above  Current Length of Stay: 4 day(s)    Current Patient Status: Inpatient   Certification Statement: The patient will continue to require additional inpatient hospital stay due to Need for safe discharge plan, arrangements being made for hospice patient will be transferred tomorrow as long as he remains medically stable to his daughter's home with hospice care    Discharge Plan:  Home with hospice tomorrow  is 3:00 p m  as long as patient remains medically stable with no indication to cancel discharge    Code Status: Level 3 - DNAR and DNI      Subjective:   Patient reports pain in his buttocks and back and does request to be repositioned  No headache or dizziness  No nausea or vomiting  Appetite is fair to poor  No chest pain or shortness of breath at rest   Expresses about being too much care for his daughter to handle  Objective:     Vitals:   Temp (24hrs), Av 9 °F (37 2 °C), Min:98 1 °F (36 7 °C), Max:100 °F (37 8 °C)    HR:  [70-74] 70  Resp:  [18] 18  BP: (112-131)/(53-92) 112/92  SpO2:  [92 %-97 %] 93 %  Body mass index is 28 86 kg/m²  Input and Output Summary (last 24 hours): Intake/Output Summary (Last 24 hours) at 18 1738  Last data filed at 18 1301   Gross per 24 hour   Intake              240 ml   Output              300 ml   Net              -60 ml       Physical Exam:     Physical Exam   Constitutional: He appears well-developed  No distress  HENT:   Head: Normocephalic  Neck: Neck supple  Cardiovascular: An irregular rhythm present  Pulmonary/Chest: Effort normal  No respiratory distress  Abdominal: Soft  Bowel sounds are normal  He exhibits no distension  Musculoskeletal: He exhibits edema  Range of motion limited secondary to decreased mobility of bilateral lower extremities  2+ pitting edema bilateral lower extremities   Neurological: He is alert  Skin: Skin is warm and dry  There is pallor  unstageable buttock ulcer     Psychiatric: He has a normal mood and affect  His behavior is normal    Vitals reviewed  Additional Data:     Labs:      Results from last 7 days  Lab Units 02/14/18  0505   WBC Thousand/uL 10 17*   HEMOGLOBIN g/dL 9 1*   HEMATOCRIT % 28 1*   PLATELETS Thousands/uL 277   NEUTROS PCT % 78*   LYMPHS PCT % 14   MONOS PCT % 7   EOS PCT % 1       Results from last 7 days  Lab Units 02/14/18  0505   SODIUM mmol/L 140   POTASSIUM mmol/L 4 1   CHLORIDE mmol/L 106   CO2 mmol/L 26   BUN mg/dL 20   CREATININE mg/dL 1 12   CALCIUM mg/dL 8 1*   GLUCOSE RANDOM mg/dL 103       Results from last 7 days  Lab Units 02/14/18  1244   INR  3 26*       * I Have Reviewed All Lab Data Listed Above  * Additional Pertinent Lab Tests Reviewed: All Labs Within Last 24 Hours Reviewed    Imaging:    Imaging Reports Reviewed Today Include:  None  Imaging Personally Reviewed by Myself Includes:  None    Recent Cultures (last 7 days):       Results from last 7 days  Lab Units 02/10/18  1747 02/10/18  1500 02/10/18  1441 02/10/18  1440   BLOOD CULTURE   --  No Growth at 72 hrs   --  No Growth at 72 hrs     URINE CULTURE   --   --  80,000-89,000 cfu/ml Proteus mirabilis*  40,000-49,000 cfu/ml   --    INFLUENZA A PCR  None Detected  --   --   --    INFLUENZA B PCR  None Detected  --   --   --    RSV PCR  None Detected  --   --   --        Last 24 Hours Medication List:     Current Facility-Administered Medications:  acetaminophen 650 mg Oral Q6H PRN Dea Zurita MD   calcium carbonate 1,000 mg Oral Daily PRN Dea Zurita MD   collagenase  Topical Daily Elia Flynn DO   cycloSPORINE 1 drop Both Eyes BID Dea Zurita MD   docusate sodium 100 mg Oral BID PRN Dea Zurita MD   finasteride 5 mg Oral HS Dea Zurita MD   flecainide 50 mg Oral QAM Dea Zurita MD   furosemide 40 mg Oral QAM Dea Zurita MD   gabapentin 100 mg Oral Daily Dea Zurita MD galantamine 4 mg Oral Daily Dea Zurita MD   lidocaine 1 patch Transdermal Daily Ladonna Dominguez PA-C   ondansetron 4 mg Intravenous Q6H PRN Dea Zurita MD   pantoprazole 40 mg Oral BID AC Dea Zurita MD   potassium chloride 20 mEq Oral Daily Dea Zurita MD   senna 2 tablet Oral BID Dea Zurita MD   sodium chloride 2 spray Each Nare PRN Dea Zurita MD   tamsulosin 0 4 mg Oral Daily With Dinner Dea Zurita MD   tolterodine 2 mg Oral Daily Dea Zurita MD   traMADol 50 mg Oral Q6H PRN Quintin Larkin PA-C   traZODone 50 mg Oral HS Dea Zurita MD        Today, Patient Was Seen By: ANTONIO Mc    ** Please Note: Dictation voice to text software may have been used in the creation of this document   **

## 2018-02-14 NOTE — ASSESSMENT & PLAN NOTE
· Patient wheelchair/bed bound  · Resides at [address] [place] [address] assisted living [facility], but will be discharged to daughter's home to go on hospice care  · Arrangements set up for discharge to daughter's home tomorrow at 3:00 p m

## 2018-02-14 NOTE — CASE MANAGEMENT
Continued Stay Review    Assessment Note from 02/13/18    Vital Signs: /53 (BP Location: Left arm)   Pulse 71   Temp 99 3 °F (37 4 °C) (Oral)   Resp 18   Ht 5' 8" (1 727 m)   Wt 86 1 kg (189 lb 13 1 oz)   SpO2 97%   BMI 28 86 kg/m²     Medications:   Scheduled Meds:   Current Facility-Administered Medications:  acetaminophen 650 mg Oral Q6H PRN Dea Zurita MD   calcium carbonate 1,000 mg Oral Daily PRN Dea Zurita MD   collagenase  Topical Daily Elia Flynn,    cycloSPORINE 1 drop Both Eyes BID Dea Zurita MD   docusate sodium 100 mg Oral BID PRN Dea Zurita MD   finasteride 5 mg Oral HS Dea Zurita MD   flecainide 50 mg Oral QAM Dea Zurita MD   furosemide 40 mg Oral QAM Dea Zurita MD   gabapentin 100 mg Oral Daily Dea Zurita MD   galantamine 4 mg Oral Daily Dea Zurita MD   lidocaine 1 patch Transdermal Daily Ladonna Dominguez PA-C   ondansetron 4 mg Intravenous Q6H PRN Dea Zurita MD   pantoprazole 40 mg Oral BID AC Dea Zurita MD   potassium chloride 20 mEq Oral Daily Dea Zurita MD   senna 2 tablet Oral BID Dea Zurita MD   sodium chloride 2 spray Each Nare PRN Dea Zurita MD   tamsulosin 0 4 mg Oral Daily With Dinner Dea Zurita MD   tolterodine 2 mg Oral Daily Dea Zurita MD   traMADol 50 mg Oral Q6H PRN Quintin Larkin PA-C   traZODone 50 mg Oral HS Dea Zurita MD     Continuous Infusions:    PRN Meds:   acetaminophen    calcium carbonate    docusate sodium    ondansetron    sodium chloride    traMADol    Abnormal Labs/Diagnostic Results:   WBC 10 60*   HEMOGLOBIN 8 9*   HEMATOCRIT 28 9*     CALCIUM 8 2*     INR   3 05*   Results from last 7 days  Lab 02/10/18  1500 02/10/18  1441   BLOOD CULTURE No Growth at 48 hrs    --    URINE CULTURE  --  80,000-89,000 cfu/ml Proteus mirabilis*  40,000-49,000 cfu/ml      Age/Sex: 80 y o  male Assessment/Plan:   Deep tissue injury   Assessment & Plan     · R buttock with DTI  No open area or drainage reported on R buttock  ? Wound care consulted, recommending offloading of pressure and hydraguard cream    ? Frequent repositioning            Normocytic anemia   Assessment & Plan     · Hemoglobin stable at 8 9 today  · Heme check stools  · Monitor CBC           Decubitus ulcer of sacral region, unstageable [facility])   Assessment & Plan     · Decubitus ulcer, unstageable, POA  · Wound care following  · Surgery following, no plans for surgical intervention at this time  · Continue air mattress   · Continue wound care daily, collagenase dressing to wound  · Turn and reposition and offload as tolerated          Bilateral leg edema   Assessment & Plan     · Continue p o  Lasix 40 mg daily  · Monitor edema           Ambulatory dysfunction   Assessment & Plan     · Patient reportedly mostly wheelchair bound  · Resides at [address] [place] [address] assisted living [facility]  ? Family request hospice  · Hospice consult pending plan discharge when arrangements made          Chronic atrial fibrillation [facility])   Assessment & Plan     · Anticoagulated wiith warfarin  ? INR 3 05  Goal INR 2-3   ? Hold Coumadin  ? Repeat INR in the morning   · HR controlled on flecanide              SSS (sick sinus syndrome) (HCC)   Assessment & Plan     · SSS status post pacemaker placement  · Outpatient follow-up with device Clinic and cardiologist, Dr Gregoria Anaya           Benign prostatic hyperplasia with lower urinary tract symptoms   Assessment & Plan     · Continue finasteride, detrol and tamsulosin  · Patient with chronic incontinence            * Fever   Assessment & Plan     · Present on admission of uncertain etiology, resolved   ? Daughter reported a fever of 101 3 at living facility  · Patient remains afebrile since admission and without evidence of leukocytosis or tachycardia  Continue to monitor VS and WBC  ?  Received 1 dose of IV cefepime and vancomycin on presentation to ED  ? Continue to monitor off IV antibiotics  · Urine culture growing proteus species  Patient with chronic incontinence  Possible colonization  Monitor off antibiotics  · Blood cultures x 2: negative  · CXR: Left retrocardiac moderate size hiatal hernia with probable adjacent chronic scarring and subsegmental atelectasis   Cannot exclude adjacent acute infiltrate  · US of scrotum, testicles obtained given swelling: Small bilateral hydroceles with scrotal wall edema  ? Elevate area  · Influenza/RSV negative  · Of note, patient recently treated as outpatient for left heel ulcer with cellulitis with 10 days p o  Keflex and was also recently hospitalized, treated for PNA                VTE Pharmacologic Prophylaxis:   Pharmacologic: Warfarin (Coumadin)  Mechanical VTE Prophylaxis in Place: No    Current Patient Status: Inpatient   Certification Statement: The patient will continue to require additional inpatient hospital stay due to plan for hospice, awaiting arrangments     Discharge Plan: home with hospice        Thank you,  [facility] in the [facility] by Dmitriy Capone for 2017  Network Utilization Review Department  Phone: [phone]; Fax [phone]  ATTENTION: The Network Utilization Review Department is now centralized for our 7 Facilities  Make a note that we have a new phone and fax numbers for our Department  Please call with any questions or concerns to [phone] and carefully follow the prompts so that you are directed to the right person  All voicemails are confidential  Fax any determinations, approvals, denials, and requests for initial or continue stay review clinical to [phone]  Due to HIGH CALL volume, it would be easier if you could please send faxed requests to expedite your requests and in part, help us provide discharge notifications faster

## 2018-02-14 NOTE — ASSESSMENT & PLAN NOTE
· R buttock with DTI  No open area or drainage reported on R buttock  · Wound care following, recommending offloading of pressure and hydraguard cream    · Frequent repositioning

## 2018-02-14 NOTE — SOCIAL WORK
CM spoke with patient's daughter Micheal Laird about discharge [facility] for [facility]  Plan is for patient to discharge home tomorrow  She would like transport set up at 3:00 as this is when she gets out of work  CM confirmed with Phyllis Beckwith from [facility] that equipment was delivered to home and that a nurse will open with them on Friday  Micheal Laird requested a bedpan, urinal, wash basin, and kidney basin to go home  These items were placed in patient's room  Micheal Laird also asked about incontinence supplies and hospice will provide tab diapers  BLS was set via Emilia Perez for 3pm tomorrow 2/15/18 to go to her home address which is [address], [address]  IMM was reviewed with her at this time  CM dept will continue to follow

## 2018-02-14 NOTE — ASSESSMENT & PLAN NOTE
· Present on admission of uncertain etiology, resolved   · Daughter reported a fever of 101 3 at living facility  · Patient remains afebrile since admission mild leukocytosis noted  Continue to monitor VS and WBC  · Received 1 dose of IV cefepime and vancomycin on presentation to ED  · Continue to monitor off IV antibiotics  · Suspect leukocytosis is reactive related to current condition  · Urine culture growing proteus species  Patient with chronic incontinence  Possible colonization  Monitor off antibiotics  · Blood cultures x 2: negative  · CXR: Left retrocardiac moderate size hiatal hernia with probable adjacent chronic scarring and subsegmental atelectasis  Cannot exclude adjacent acute infiltrate  · US of scrotum, testicles obtained given swelling: Small bilateral hydroceles with scrotal wall edema  · Elevate area  · Influenza/RSV negative  · Of note, patient recently treated as outpatient for left heel ulcer with cellulitis with 10 days p o  Keflex and was also recently hospitalized, treated for PNA

## 2018-02-15 VITALS
DIASTOLIC BLOOD PRESSURE: 58 MMHG | OXYGEN SATURATION: 96 % | RESPIRATION RATE: 18 BRPM | SYSTOLIC BLOOD PRESSURE: 120 MMHG | TEMPERATURE: 97.7 F | HEART RATE: 71 BPM | HEIGHT: 68 IN | WEIGHT: 189.82 LBS | BODY MASS INDEX: 28.77 KG/M2

## 2018-02-15 LAB
INR PPP: 3.39 (ref 0.86–1.16)
PROTHROMBIN TIME: 35.6 SECONDS (ref 12.1–14.4)

## 2018-02-15 PROCEDURE — 99239 HOSP IP/OBS DSCHRG MGMT >30: CPT | Performed by: NURSE PRACTITIONER

## 2018-02-15 PROCEDURE — 85610 PROTHROMBIN TIME: CPT | Performed by: NURSE PRACTITIONER

## 2018-02-15 RX ORDER — OXYCODONE HCL 5 MG/5 ML
5 SOLUTION, ORAL ORAL EVERY 4 HOURS PRN
Qty: 15 ML | Refills: 0 | Status: SHIPPED | OUTPATIENT
Start: 2018-02-15 | End: 2018-02-15

## 2018-02-15 RX ORDER — OXYCODONE AND ACETAMINOPHEN 2.5; 325 MG/1; MG/1
1 TABLET ORAL EVERY 6 HOURS PRN
Qty: 20 TABLET | Refills: 0 | Status: SHIPPED | OUTPATIENT
Start: 2018-02-15 | End: 2018-02-25

## 2018-02-15 RX ORDER — OXYCODONE HCL 5 MG/5 ML
5 SOLUTION, ORAL ORAL EVERY 4 HOURS PRN
Qty: 15 ML | Refills: 0 | Status: SHIPPED | OUTPATIENT
Start: 2018-02-15 | End: 2018-02-25

## 2018-02-15 RX ORDER — TRAMADOL HYDROCHLORIDE 50 MG/1
50 TABLET ORAL EVERY 6 HOURS PRN
Qty: 20 TABLET | Refills: 0 | Status: SHIPPED | OUTPATIENT
Start: 2018-02-15 | End: 2018-02-20

## 2018-02-15 RX ADMIN — LIDOCAINE 1 PATCH: 50 PATCH TOPICAL at 09:56

## 2018-02-15 RX ADMIN — FLECAINIDE ACETATE 50 MG: 50 TABLET ORAL at 09:53

## 2018-02-15 RX ADMIN — PANTOPRAZOLE SODIUM 40 MG: 40 TABLET, DELAYED RELEASE ORAL at 09:23

## 2018-02-15 RX ADMIN — TRAMADOL HYDROCHLORIDE 50 MG: 50 TABLET, FILM COATED ORAL at 09:23

## 2018-02-15 RX ADMIN — FUROSEMIDE 40 MG: 40 TABLET ORAL at 09:55

## 2018-02-15 RX ADMIN — COLLAGENASE SANTYL: 250 OINTMENT TOPICAL at 09:52

## 2018-02-15 RX ADMIN — SENNOSIDES 17.2 MG: 8.6 TABLET, FILM COATED ORAL at 09:59

## 2018-02-15 RX ADMIN — GABAPENTIN 100 MG: 100 CAPSULE ORAL at 09:56

## 2018-02-15 RX ADMIN — TOLTERODINE TARTRATE 2 MG: 2 CAPSULE, EXTENDED RELEASE ORAL at 10:00

## 2018-02-15 RX ADMIN — ACETAMINOPHEN 650 MG: 325 TABLET, FILM COATED ORAL at 00:24

## 2018-02-15 NOTE — ASSESSMENT & PLAN NOTE
· Decubitus ulcer, unstageable, POA  · Surgery following  · No plans for surgical intervention  · Continue air mattress   · Continue wound care daily, santyl dressing to wound  · Turn and reposition and offload as tolerated

## 2018-02-15 NOTE — DISCHARGE SUMMARY
Discharge- Lamar Koyanagi 3/30/1920, 80 y o  male MRN: [id number]    Unit/Bed#: -01 Encounter: [id number]    Primary Care Provider: Denny Stone MD   Date and time admitted to hospital: 2/10/2018 10:40 AM        Deep tissue injury   Assessment & Plan    · R buttock with DTI  No open area or drainage reported on R buttock  · recommend offloading of pressure and hydraguard cream    · Frequent repositioning  Normocytic anemia   Assessment & Plan    · Hemoglobin stable    · Monitor CBC         Decubitus ulcer of sacral region, unstageable ([facility] )   Assessment & Plan    · Decubitus ulcer, unstageable, POA  · Surgery following  · No plans for surgical intervention  · Continue air mattress   · Continue wound care daily, santyl dressing to wound  · Turn and reposition and offload as tolerated        Bilateral leg edema   Assessment & Plan    · Continue p o  Lasix 40 mg daily  As tolerated        Ambulatory dysfunction   Assessment & Plan    · Patient wheelchair/bed bound  · Plan discharge to daughter's home today with hospice care          Chronic atrial fibrillation ([facility] )   Assessment & Plan    · Anticoagulated wiith warfarin, but has been on hold for the past 48 hours due to supratherapeutic INR  · Discussed with daughter she is unsure she will be discontinue this medication as she has not spoken with the hospice liaison regarding plans for after discharge  Advised to continue to hold this medication and discuss further with the hospice liaison the initial visit which will be scheduled for Friday February 16th  · INR   Goal INR 2-3  · Today's INR 3 39  · Continue to hold Coumadin  · HR controlled on flecanide  SSS (sick sinus syndrome) (HCC)   Assessment & Plan    · SSS status post pacemaker placement  · Outpatient follow-up with device Clinic and cardiologist, Dr Radha Roldan          Benign prostatic hyperplasia with lower urinary tract symptoms   Assessment & Plan    · Continue finasteride, detrol and tamsulosin  · Patient with chronic incontinence  * Fever   Assessment & Plan    · Present on admission of uncertain etiology, resolved   · Daughter reported a fever of 101 3 at living facility  · Patient remains afebrile since admission mild leukocytosis noted  Continue to monitor VS and WBC  · Received 1 dose of IV cefepime and vancomycin on presentation to ED  · Continue to monitor off IV antibiotics  · Suspect leukocytosis is reactive related to current condition  · Urine culture growing proteus species  Patient with chronic incontinence  Possible colonization  Monitor off antibiotics  · Blood cultures x 2: negative  · CXR: Left retrocardiac moderate size hiatal hernia with probable adjacent chronic scarring and subsegmental atelectasis  Cannot exclude adjacent acute infiltrate  · US of scrotum, testicles obtained given swelling: Small bilateral hydroceles with scrotal wall edema  · Elevate area  · Influenza/RSV negative  · Of note, patient recently treated as outpatient for left heel ulcer with cellulitis with 10 days p o  Keflex and was also recently hospitalized, treated for PNA  Resolved Problems  Date Reviewed: 2/15/2018    None          Consultations During Hospital Stay:  · Wound care  · Surgery     Procedures Performed:     · none    Significant Findings / Test Results:     Xr Chest 1 View Portable Impression: Left retrocardiac moderate size hiatal hernia with probable adjacent chronic scarring and subsegmental atelectasis  Cannot exclude adjacent acute infiltrate  Overall, appearance is unchanged compared to the frontal view of the prior radiograph  Us Scrotum And Testicles Result Date: 2/12/2018 Impression:  1  Small bilateral hydroceles with scrotal wall edema  2   No testicular mass lesions with heterogeneous echotexture bilaterally   3   Small complex left epididymal head cyst    ·     Incidental Findings:   ·  none    Test Results Pending at Discharge (will require follow up):   · none     Outpatient Tests Requested:  · None if remains on hospice if resuming coumain will need INR    Complications:  none    Reason for Admission: fever    Hospital Course:     Simi Tuttle is a 80 y o  male patient with PMH afib, CAD, pacemaker, DVT/IVC filter, who originally presented to the hospital on 2/10/2018 due to fever,mild cough  He was rendetly trested for cellulitis of left heel ulcer and recently hospitalized and treated for PNA  He was empirically given cefepime and vanco in the ER for one dose  Has also has chronic pressure ulcers  CXR was without evidence of PNA  UA negative for infection  Flu/RSV PCR negative  Blood cultures negative  Buttock ulcers did not appear infected  Source of fever unknown  Leukocytosis thought to be reactive to current medical stat  Fever resolved  He was monitored off abx  He was noted to have scrotal swelling, an ultrasound was completed with no significant abnormality  Surgery was consulted regarding sacral/buttock wounds  Recommended wound care daily  Surgical debridement was deferred as patient was being discharged home with hospice  Family decided on hospice care after discharge  Patients daughter set up hospice care for the home  He was discharged home with hospice  Please note his INR was supratherapeutic at time of discharge and coumadin was held  Patient daughter was unsure f which meds he would remain on after discharge as she had not talked with the hospice liaison yet and wouldn't talk with them unitl he is formally discharged home  Advised to hold coumadin until she talks with hospice and sorts out what he will be taking at home  Did explain he most likely will not be resuming coumain as this requires routine lab work and therapeutic monitoring  He was given rx for oxy IR liquid and percocet pfr pain control until hospice can see him and asses his needs on Friday       Please see above list of diagnoses and related plan for additional information  Condition at Discharge: stable     Discharge Day Visit / Exam:     Subjective:  Patient reports discomfort in back and buttocks  Also reports feeling worn out/tired  He also expresses concern stating "my daughter doesn't know what she is getting herself into"  No SOB, No CP  Vitals: Blood Pressure: 120/58 (02/15/18 0700)  Pulse: 71 (02/15/18 0700)  Temperature: 97 7 °F (36 5 °C) (02/15/18 0700)  Temp Source: Oral (02/15/18 0700)  Respirations: 18 (02/15/18 0700)  Height: 5' 8" (172 7 cm) (02/10/18 0940)  Weight - Scale: 86 1 kg (189 lb 13 1 oz) (02/14/18 0500)  SpO2: 96 % (02/15/18 0700)  Exam:   Physical Exam   Constitutional: He appears well-developed  No distress  HENT:   Head: Normocephalic  Neck: Neck supple  Cardiovascular: An irregular rhythm present  Pulmonary/Chest: Effort normal  No respiratory distress  Abdominal: Soft  Bowel sounds are normal  He exhibits no distension  Musculoskeletal: He exhibits edema  Range of motion limited secondary to decreased mobility of bilateral lower extremities  2+ pitting edema bilateral lower extremities   Neurological: He is alert  Skin: Skin is warm and dry  There is pallor  unstageable buttock ulcer    Discussion with Family: patients daughter    Discharge instructions/Information to patient and family:   See after visit summary for information provided to patient and family  Provisions for Follow-Up Care:  See after visit summary for information related to follow-up care and any pertinent home health orders  Disposition:     Home with VNA Services (Reminder: Complete face to face encounter)    For Discharges to [facility] SNF:   · Not Applicable to this Patient - Not Applicable to this Patient    Planned Readmission: not anticipated     Discharge Statement:  I spent 40 minutes discharging the patient  This time was spent on the day of discharge   I had direct contact with the patient on the day of discharge  Greater than 50% of the total time was spent examining patient, answering all patient questions, arranging and discussing plan of care with patient as well as directly providing post-discharge instructions  Additional time then spent on discharge activities  Discharge Medications:  See after visit summary for reconciled discharge medications provided to patient and family        ** Please Note: This note has been constructed using a voice recognition system **

## 2018-02-15 NOTE — SOCIAL WORK
Patient is medically stable for discharge home today with [facility], [facility]  CM spoke with patient's daughter and reviewed DCP  BLS transport is set up for 3:00 PM via [facility]  IMM was reviewed yesterday  No further CM discharge needs were discussed or identified

## 2018-02-15 NOTE — ASSESSMENT & PLAN NOTE
· Anticoagulated wiith warfarin, but has been on hold for the past 48 hours due to supratherapeutic INR  · Discussed with daughter she is unsure she will be discontinue this medication as she has not spoken with the hospice liaison regarding plans for after discharge  Advised to continue to hold this medication and discuss further with the hospice liaison the initial visit which will be scheduled for Friday February 16th  · INR   Goal INR 2-3  · Today's INR 3 39  · Continue to hold Coumadin  · HR controlled on flecanide

## 2018-02-15 NOTE — PLAN OF CARE

## 2018-02-15 NOTE — PLAN OF CARE
Problem: DISCHARGE PLANNING - CARE MANAGEMENT  Goal: Discharge to post-acute care or home with appropriate resources  INTERVENTIONS:  - Conduct assessment to determine patient/family and health care team treatment goals, and need for post-acute services based on payer coverage, community resources, and patient preferences, and barriers to discharge  - Address psychosocial, clinical, and financial barriers to discharge as identified in assessment in conjunction with the patient/family and health care team  - Arrange appropriate level of post-acute services according to patient's   needs and preference and payer coverage in collaboration with the physician and health care team  - Communicate with and update the patient/family, physician, and health care team regarding progress on the discharge plan  - Arrange appropriate transportation to post-acute venues   Outcome: Completed Date Met: 02/15/18  Patient is medically stable for discharge home today with [facility], [facility]  CM spoke with patient's daughter and reviewed DCP  BLS transport is set up for 3:00 PM via [facility]  IMM was reviewed yesterday  No further CM discharge needs were discussed or identified

## 2018-02-15 NOTE — DISCHARGE INSTRUCTIONS
Please hold Coumadin as the INR was supratherapeutic above goal continue to hold until you discuss the plan of care with the hospice team it is likely that he will not be resuming this medication, it does require frequent monitoring of blood for therapeutic levels  For unstageable left buttock ulcer apply santyl dressing to be changed daily or as needed due to patient's incontinence turn every 2 hours to offload pressure on buttock area        [facility]:   What is hospice care? Hospice care focuses on relieving your symptoms and improving the quality of the last months of your life  Hospice care will be specific to your needs and the needs of your family  Care can be provided at home or in a hospital  It can also be provided in a specialized hospice facility or long-term care facility  Who provides hospice care? Hospice care is provided by a team trained in support and education related to death and dying  This team includes doctors, nurses, and social workers  It may also include home health aides, clergy, therapists, and trained volunteers  A team member will be available any time day or night to answer questions or help with problems  The hospice doctor and your healthcare provider will work together to manage your treatment  What do hospice care services include? · Treatment management  helps ease your symptoms, such as pain  This may be done using medicines or certain therapies  Equipment, a bed, or other medical supplies may be provided to help care for you  · Emotional and psychological care  is provided to help you, your family, and those close to you cope with their feelings  Regular meetings will be held to discuss your condition, your needs, and the needs of your loved ones  You and your family may join support groups or meet others in similar situations  · Respite care  provides your family and healthcare providers up to 5 days to rest from providing care  During this time, you will be cared for in a hospice facility, hospital, or long-term care facility  · Practical support  assists you and your family with concerns such as legal issues and  arrangements  Your  can help you find services that fit your needs and your family's needs  · Spiritual and cultural support  helps you and your family evaluate [employer] values and cultural beliefs  Thinking about values and beliefs may make it easier to understand and accept your condition  · Loss support  is provided to the family for about 1 year after death  The hospice care team will help your family through the process of grieving  Your loved ones will receive visits and phone calls, and may be referred to support groups  CARE AGREEMENT:   You have the right to help plan your care  Learn about your health condition and how it may be treated  Discuss treatment options with your caregivers to decide what care you want to receive  You always have the right to refuse treatment  The above information is an  only  It is not intended as medical advice for individual conditions or treatments  Talk to your doctor, nurse or pharmacist before following any medical regimen to see if it is safe and effective for you  ©  [employer]  Information is for End User's use only and may not be sold, redistributed or otherwise used for commercial purposes  All illustrations and images included in CareNotes® are the copyrighted property of [employer]  or Dmitriy Capone

## 2018-02-15 NOTE — ASSESSMENT & PLAN NOTE
· R buttock with DTI  No open area or drainage reported on R buttock  · recommend offloading of pressure and hydraguard cream    · Frequent repositioning

## 2018-02-16 LAB
BACTERIA BLD CULT: NORMAL
BACTERIA BLD CULT: NORMAL

## 2018-02-21 ENCOUNTER — TRANSITIONAL CARE MANAGEMENT (OUTPATIENT)
Dept: INTERNAL MEDICINE CLINIC | Facility: CLINIC | Age: 83
End: 2018-02-21

## 2018-02-21 PROCEDURE — TCMPR: Performed by: INTERNAL MEDICINE

## 2018-03-07 NOTE — PROGRESS NOTES
"  Discussion/Summary  Normal device function      Results/Data  Cardiac Device In Clinic [id number] 06:32PM Kc Pals     Test Name Result Flag Reference   MISCELLANEOUS COMMENT (Report)     DEVICE INTERROGATED IN THE [facility] OFFICE  BATTERY VOLTAGE ADEQUATE (2 5 YRS)  AP-94%, >99% (>40% DDDR @ 70 PPM/CHB)  NO SIGNIFICANT HIGH RATE EPISODES  ALL LEAD PARAMETERS WITHIN NORMAL LIMITS  ALL OTHER TESTING WITHIN NORMAL LIMITS  NORMAL DEVICE FUNCTION  GV   Cardiac Electrophysiology Report      [id number]  pdf   DEVICE TYPE Pacemaker       Cardiac Electrophysiology Report [id number] 06:32PM Kc Pals     Test Name Result Flag Reference   Cardiac Electrophysiology Report      [id number]  pdf     Signatures   Electronically signed by : Shea Sarabia RN; Nov 1 2016  2:44PM EST                       (Author)    Electronically signed by : RADHA De Jesus ; Nov 1 2016 10:58PM EST                       (Author)    "

## 2018-03-07 NOTE — PROGRESS NOTES
"  Discussion/Summary  Normal device function      Results/Data  Cardiac Device Remote [id number] 05:58PM Taran Mendes     Test Name Result Flag Reference   MISCELLANEOUS COMMENT      CARELINK TRANSMISSION: [id number] VOLTAGE ADEQUATE  (2 5 YRS)  AP 96%  99%  ALL AVAILABLE LEAD PARAMETERS WITHIN NORMAL LIMITS  NO SIGNIFICANT HIGH RATE EPISODES  NORMAL DEVICE FUNCTION  ---GOMEZ   Cardiac Electrophysiology Report      [id number]  pdf   DEVICE TYPE Pacemaker       Cardiac Electrophysiology Report [id number] 05:58PM Taran Mendes     Test Name Result Flag Reference   Cardiac Electrophysiology Report      gi[id number]  pdf     Signatures   Electronically signed by : Mike Cruz, ; May 11 2017  3:39PM EST                       (Author)    Electronically signed by : Winston Miller DO; May 13 2017 12:15PM EST                       (Author)    "

## 2018-03-07 NOTE — PROGRESS NOTES
"  Discussion/Summary  Normal device function      Results/Data  Cardiac Device Remote [id number] 04:45PM Xuan Wiggins     Test Name Result Flag Reference   MISCELLANEOUS COMMENT      CARELINK TRANSMISSION: BATTERY VOLTAGE ADEQUATE (2 5 YRS)  AP-93%, >99% (>40% CHB)  NO SIGNIFICANT HIGH RATE EPISODES  ALL AVAILABLE LEAD PARAMETERS WITHIN NORMAL LIMITS  NORMAL DEVICE FUNCTION  GV   Cardiac Electrophysiology Report      slh[id number]-[facility]_[place]_[id number]  pdf   DEVICE TYPE Pacemaker       Cardiac Electrophysiology Report 02Sep2016 04:45PM Xuan Wiggins     Test Name Result Flag Reference   Cardiac Electrophysiology Report      [id number]  pdf     Signatures   Electronically signed by : Remy Good RN; Sep  2 2016  3:41PM EST                       (Author)    Electronically signed by : Arian Mcguire DO; Sep  4 2016  5:14PM EST                       (Author)    "

## 2018-03-07 NOTE — PROGRESS NOTES
"  Discussion/Summary  Normal device function      Results/Data  Results   Cardiac Device Remote [id number] 02:20PM [employer]     Test Name Result Flag Reference   MISCELLANEOUS COMMENT      DEVICE INTERROGATED IN THE [place] OFFICE: BATTERY STATUS "OK"  AP 93%  99 8%  NO SIGNIFICANT HIGH RATE EPISODES  ALL AVAILABLE LEAD PARAMETERS WITHIN NORMAL LIMITS  NORMAL DEVICE FUNCTION  NC   Cardiac Electrophysiology Report      fx[id number]}  pdf   DEVICE TYPE Pacemaker       Cardiac Electrophysiology Report [id number] 02:20PM Tami Viigo     Test Name Result Flag Reference   Cardiac Electrophysiology Report      ul[id number] pdf     Signatures   Electronically signed by : Adali García, ; Apr 26 2016  1:59PM EST                       (Author)    Electronically signed by : Marija Means DO;  Apr 30 2016  5:18PM EST                       (Author)    "

## 2018-03-07 NOTE — PROGRESS NOTES
"  Discussion/Summary  Normal device function      Results/Data  Cardiac Device In Clinic [id number] 08:06PM Tania Handley     Test Name Result Flag Reference   MISCELLANEOUS COMMENT      DEVICE INTERROGATED IN THE [place] OFFICE: BATTERY VOLTAGE ADEQUATE  AP 96 1%  99 9%  ALL AVAILABLE LEAD PARAMETERS WITHIN NORMAL LIMITS  NO SIGNIFICANT HIGH RATE EPISODES  NO PROGRAMMING CHANGES MADE TO DEVICE PARAMETERS  NORMAL DEVICE FUNCTION  NC   Cardiac Electrophysiology Report      [id number]  PDF   DEVICE TYPE Pacemaker       Cardiac Electrophysiology Report [id number] 08:06PM Tania Handley     Test Name Result Flag Reference   Cardiac Electrophysiology Report      [id number]  pdf     Signatures   Electronically signed by : Betsey Delacruz, ; Nov 28 2017  3:27PM EST                       (Author)    Electronically signed by : Anselmo Rain DO; Dec  7 2017  7:28PM EST                       (Author)    "

## 2018-03-07 NOTE — PROGRESS NOTES
"  Discussion/Summary  Normal device function      Results/Data  Cardiac Device Remote [id number] 06:19PM Jose Cruz Craze     Test Name Result Flag Reference   MISCELLANEOUS COMMENT      CARELINK TRANSMISSION: BATTERY VOLTAGE ADEQUATE (2 5 YRS)  AP-96%, -100% (>40% CHB)  NO SIGNIFICANT HIGH RATE EPISODES  ALL AVAILABLE LEAD PARAMETERS WITHIN NORMAL LIMITS  NORMAL DEVICE FUNCTION  GV   Cardiac Electrophysiology Report      sl[id number]_[place]_[id number]_[id number]  pdf   DEVICE TYPE Pacemaker       Cardiac Electrophysiology Report [id number] 06:19PM Jose Cruz Craze     Test Name Result Flag Reference   Cardiac Electrophysiology Report      ep[id number]  pdf     Signatures   Electronically signed by : Khalif Piña RN; Feb 8 2017  2:50PM EST                       (Author)    Electronically signed by : Reynaldo Hamm DO; Feb 19 2017  4:48PM EST                       (Author)    "

## 2018-03-16 ENCOUNTER — ANTICOAG VISIT (OUTPATIENT)
Dept: INTERNAL MEDICINE CLINIC | Facility: CLINIC | Age: 83
End: 2018-03-16

## 2018-04-13 DIAGNOSIS — I47.1 SVT (SUPRAVENTRICULAR TACHYCARDIA) (HCC): Primary | ICD-10-CM

## 2018-04-13 RX ORDER — FLECAINIDE ACETATE 50 MG/1
TABLET ORAL
Qty: 90 TABLET | Refills: 3 | Status: SHIPPED | OUTPATIENT
Start: 2018-04-13

## 2018-04-13 RX ORDER — GALANTAMINE HYDROBROMIDE 4 MG/1
TABLET, FILM COATED ORAL
Qty: 90 TABLET | Refills: 3 | OUTPATIENT
Start: 2018-04-13

## 2018-07-27 DIAGNOSIS — G30.1 LATE ONSET ALZHEIMER'S DISEASE WITHOUT BEHAVIORAL DISTURBANCE (HCC): Primary | ICD-10-CM

## 2018-07-27 DIAGNOSIS — F02.80 LATE ONSET ALZHEIMER'S DISEASE WITHOUT BEHAVIORAL DISTURBANCE (HCC): Primary | ICD-10-CM

## 2018-07-27 RX ORDER — GALANTAMINE HYDROBROMIDE 4 MG/1
4 TABLET, FILM COATED ORAL DAILY
Qty: 90 TABLET | Refills: 3 | Status: SHIPPED | OUTPATIENT
Start: 2018-07-27